# Patient Record
Sex: FEMALE | Race: WHITE | NOT HISPANIC OR LATINO | Employment: OTHER | URBAN - METROPOLITAN AREA
[De-identification: names, ages, dates, MRNs, and addresses within clinical notes are randomized per-mention and may not be internally consistent; named-entity substitution may affect disease eponyms.]

---

## 2017-08-25 ENCOUNTER — HISTORICAL (OUTPATIENT)
Dept: RADIOLOGY | Facility: HOSPITAL | Age: 62
End: 2017-08-25

## 2017-09-27 ENCOUNTER — HISTORICAL (OUTPATIENT)
Dept: CARDIOLOGY | Facility: CLINIC | Age: 62
End: 2017-09-27

## 2017-10-28 ENCOUNTER — HISTORICAL (OUTPATIENT)
Dept: LAB | Facility: HOSPITAL | Age: 62
End: 2017-10-28

## 2017-10-28 LAB
ABS NEUT (OLG): 3.16 X10(3)/MCL (ref 2.1–9.2)
ALBUMIN SERPL-MCNC: 3.7 GM/DL (ref 3.4–5)
ALBUMIN/GLOB SERPL: 1 RATIO (ref 1–2)
ALP SERPL-CCNC: 117 UNIT/L (ref 45–117)
ALT SERPL-CCNC: 31 UNIT/L (ref 12–78)
AST SERPL-CCNC: 24 UNIT/L (ref 15–37)
BASOPHILS # BLD AUTO: 0.04 X10(3)/MCL
BASOPHILS NFR BLD AUTO: 1 % (ref 0–1)
BILIRUB SERPL-MCNC: 0.4 MG/DL (ref 0.2–1)
BILIRUBIN DIRECT+TOT PNL SERPL-MCNC: 0.1 MG/DL
BILIRUBIN DIRECT+TOT PNL SERPL-MCNC: 0.3 MG/DL
BUN SERPL-MCNC: 14 MG/DL (ref 7–18)
CALCIUM SERPL-MCNC: 8.9 MG/DL (ref 8.5–10.1)
CHLORIDE SERPL-SCNC: 106 MMOL/L (ref 98–107)
CO2 SERPL-SCNC: 29 MMOL/L (ref 21–32)
CREAT SERPL-MCNC: 0.6 MG/DL (ref 0.6–1.3)
EOSINOPHIL # BLD AUTO: 0.12 10*3/UL
EOSINOPHIL NFR BLD AUTO: 2 % (ref 0–5)
ERYTHROCYTE [DISTWIDTH] IN BLOOD BY AUTOMATED COUNT: 16.2 % (ref 11.5–14.5)
GLOBULIN SER-MCNC: 3.8 GM/ML (ref 2.3–3.5)
GLUCOSE SERPL-MCNC: 95 MG/DL (ref 74–106)
HCT VFR BLD AUTO: 38.4 % (ref 35–46)
HGB BLD-MCNC: 12.5 GM/DL (ref 12–16)
IMM GRANULOCYTES # BLD AUTO: 0.01 10*3/UL
IMM GRANULOCYTES NFR BLD AUTO: 0 %
LYMPHOCYTES # BLD AUTO: 1.68 X10(3)/MCL
LYMPHOCYTES NFR BLD AUTO: 30 % (ref 15–40)
MCH RBC QN AUTO: 29.1 PG (ref 26–34)
MCHC RBC AUTO-ENTMCNC: 32.6 GM/DL (ref 31–37)
MCV RBC AUTO: 89.5 FL (ref 80–100)
MONOCYTES # BLD AUTO: 0.62 X10(3)/MCL
MONOCYTES NFR BLD AUTO: 11 % (ref 4–12)
NEUTROPHILS # BLD AUTO: 3.16 X10(3)/MCL
NEUTROPHILS NFR BLD AUTO: 56 X10(3)/MCL
PLATELET # BLD AUTO: 199 X10(3)/MCL (ref 130–400)
PMV BLD AUTO: 11 FL (ref 7.4–10.4)
POTASSIUM SERPL-SCNC: 3.9 MMOL/L (ref 3.5–5.1)
PROT SERPL-MCNC: 7.5 GM/DL (ref 6.4–8.2)
RBC # BLD AUTO: 4.29 X10(6)/MCL (ref 4–5.2)
SODIUM SERPL-SCNC: 143 MMOL/L (ref 136–145)
WBC # SPEC AUTO: 5.6 X10(3)/MCL (ref 4.5–11)

## 2017-11-10 ENCOUNTER — HISTORICAL (OUTPATIENT)
Dept: RADIOLOGY | Facility: HOSPITAL | Age: 62
End: 2017-11-10

## 2018-11-16 ENCOUNTER — HISTORICAL (OUTPATIENT)
Dept: ENDOSCOPY | Facility: HOSPITAL | Age: 63
End: 2018-11-16

## 2018-11-16 LAB — CRC RECOMMENDATION EXT: NORMAL

## 2019-04-11 ENCOUNTER — HISTORICAL (OUTPATIENT)
Dept: RADIOLOGY | Facility: HOSPITAL | Age: 64
End: 2019-04-11

## 2019-09-23 ENCOUNTER — HISTORICAL (OUTPATIENT)
Dept: LAB | Facility: HOSPITAL | Age: 64
End: 2019-09-23

## 2019-09-23 LAB
ALBUMIN SERPL-MCNC: 3.8 GM/DL (ref 3.4–5)
ALBUMIN/GLOB SERPL: 0.9 RATIO (ref 1.1–2)
ALP SERPL-CCNC: 136 UNIT/L (ref 45–117)
ALT SERPL-CCNC: 37 UNIT/L (ref 12–78)
AST SERPL-CCNC: 25 UNIT/L (ref 15–37)
BILIRUB SERPL-MCNC: 0.5 MG/DL (ref 0.2–1)
BILIRUBIN DIRECT+TOT PNL SERPL-MCNC: 0.2 MG/DL (ref 0–0.2)
BILIRUBIN DIRECT+TOT PNL SERPL-MCNC: 0.3 MG/DL
BUN SERPL-MCNC: 13 MG/DL (ref 7–18)
CALCIUM SERPL-MCNC: 9.5 MG/DL (ref 8.5–10.1)
CHLORIDE SERPL-SCNC: 103 MMOL/L (ref 98–107)
CHOLEST SERPL-MCNC: 178 MG/DL
CHOLEST/HDLC SERPL: 2.7 {RATIO} (ref 0–4.4)
CO2 SERPL-SCNC: 33 MMOL/L (ref 21–32)
CREAT SERPL-MCNC: 0.8 MG/DL (ref 0.6–1.3)
GLOBULIN SER-MCNC: 4.4 GM/ML (ref 2.3–3.5)
GLUCOSE SERPL-MCNC: 118 MG/DL (ref 74–106)
HDLC SERPL-MCNC: 65 MG/DL (ref 40–59)
LDLC SERPL CALC-MCNC: 90 MG/DL
POTASSIUM SERPL-SCNC: 3.5 MMOL/L (ref 3.5–5.1)
PROT SERPL-MCNC: 8.2 GM/DL (ref 6.4–8.2)
SODIUM SERPL-SCNC: 142 MMOL/L (ref 136–145)
TRIGL SERPL-MCNC: 113 MG/DL
VLDLC SERPL CALC-MCNC: 23 MG/DL

## 2019-09-25 ENCOUNTER — HISTORICAL (OUTPATIENT)
Dept: LAB | Facility: HOSPITAL | Age: 64
End: 2019-09-25

## 2019-09-25 ENCOUNTER — HISTORICAL (OUTPATIENT)
Dept: RADIOLOGY | Facility: HOSPITAL | Age: 64
End: 2019-09-25

## 2019-09-25 LAB
ALP SERPL-CCNC: 127 UNIT/L (ref 45–117)
GGT SERPL-CCNC: 28 UNIT/L (ref 5–85)

## 2020-04-08 ENCOUNTER — HISTORICAL (OUTPATIENT)
Dept: RADIOLOGY | Facility: HOSPITAL | Age: 65
End: 2020-04-08

## 2020-09-01 ENCOUNTER — HISTORICAL (OUTPATIENT)
Dept: LAB | Facility: HOSPITAL | Age: 65
End: 2020-09-01

## 2020-09-01 LAB
ALBUMIN SERPL-MCNC: 3.8 GM/DL (ref 3.4–5)
ALBUMIN/GLOB SERPL: 0.8 RATIO (ref 1.1–2)
ALP SERPL-CCNC: 155 UNIT/L (ref 45–117)
ALT SERPL-CCNC: 26 UNIT/L (ref 12–78)
AST SERPL-CCNC: 23 UNIT/L (ref 15–37)
BILIRUB SERPL-MCNC: 0.5 MG/DL (ref 0.2–1)
BILIRUBIN DIRECT+TOT PNL SERPL-MCNC: 0.1 MG/DL (ref 0–0.2)
BILIRUBIN DIRECT+TOT PNL SERPL-MCNC: 0.4 MG/DL
BUN SERPL-MCNC: 11 MG/DL (ref 7–18)
CALCIUM SERPL-MCNC: 9.6 MG/DL (ref 8.5–10.1)
CHLORIDE SERPL-SCNC: 101 MMOL/L (ref 98–107)
CHOLEST SERPL-MCNC: 161 MG/DL
CHOLEST/HDLC SERPL: 2.4 {RATIO} (ref 0–4.4)
CO2 SERPL-SCNC: 32 MMOL/L (ref 21–32)
CREAT SERPL-MCNC: 0.8 MG/DL (ref 0.6–1.3)
ERYTHROCYTE [DISTWIDTH] IN BLOOD BY AUTOMATED COUNT: 12.6 % (ref 11.5–14.5)
GLOBULIN SER-MCNC: 4.6 GM/ML (ref 2.3–3.5)
GLUCOSE SERPL-MCNC: 120 MG/DL (ref 74–106)
HCT VFR BLD AUTO: 42.3 % (ref 35–46)
HDLC SERPL-MCNC: 66 MG/DL (ref 40–59)
HGB BLD-MCNC: 14 GM/DL (ref 12–16)
LDLC SERPL CALC-MCNC: 74 MG/DL
MCH RBC QN AUTO: 30.4 PG (ref 26–34)
MCHC RBC AUTO-ENTMCNC: 33.1 GM/DL (ref 31–37)
MCV RBC AUTO: 92 FL (ref 80–100)
PLATELET # BLD AUTO: 214 X10(3)/MCL (ref 130–400)
PMV BLD AUTO: 11.4 FL (ref 7.4–10.4)
POTASSIUM SERPL-SCNC: 2.9 MMOL/L (ref 3.5–5.1)
PROT SERPL-MCNC: 8.4 GM/DL (ref 6.4–8.2)
RBC # BLD AUTO: 4.6 X10(6)/MCL (ref 4–5.2)
SODIUM SERPL-SCNC: 141 MMOL/L (ref 136–145)
TRIGL SERPL-MCNC: 106 MG/DL
TSH SERPL-ACNC: 2.44 MIU/L (ref 0.36–3.74)
VLDLC SERPL CALC-MCNC: 21 MG/DL
WBC # SPEC AUTO: 7.6 X10(3)/MCL (ref 4.5–11)

## 2020-10-21 ENCOUNTER — HISTORICAL (OUTPATIENT)
Dept: LAB | Facility: HOSPITAL | Age: 65
End: 2020-10-21

## 2020-11-10 ENCOUNTER — HISTORICAL (OUTPATIENT)
Dept: RADIOLOGY | Facility: HOSPITAL | Age: 65
End: 2020-11-10

## 2020-11-12 ENCOUNTER — HISTORICAL (OUTPATIENT)
Dept: RADIOLOGY | Facility: HOSPITAL | Age: 65
End: 2020-11-12

## 2021-01-12 ENCOUNTER — HISTORICAL (OUTPATIENT)
Dept: LAB | Facility: HOSPITAL | Age: 66
End: 2021-01-12

## 2021-02-03 ENCOUNTER — HISTORICAL (OUTPATIENT)
Dept: RADIOLOGY | Facility: HOSPITAL | Age: 66
End: 2021-02-03

## 2021-03-26 ENCOUNTER — HISTORICAL (OUTPATIENT)
Dept: LAB | Facility: HOSPITAL | Age: 66
End: 2021-03-26

## 2021-04-06 ENCOUNTER — HISTORICAL (OUTPATIENT)
Dept: RADIOLOGY | Facility: HOSPITAL | Age: 66
End: 2021-04-06

## 2021-09-17 ENCOUNTER — HISTORICAL (OUTPATIENT)
Dept: ADMINISTRATIVE | Facility: HOSPITAL | Age: 66
End: 2021-09-17

## 2021-09-17 LAB — TSH SERPL-ACNC: 1.2 UIU/ML (ref 0.35–4.94)

## 2021-11-09 ENCOUNTER — HISTORICAL (OUTPATIENT)
Dept: ADMINISTRATIVE | Facility: HOSPITAL | Age: 66
End: 2021-11-09

## 2021-11-09 LAB
ABS NEUT (OLG): 3.56 X10(3)/MCL (ref 2.1–9.2)
ALBUMIN SERPL-MCNC: 4.1 GM/DL (ref 3.4–4.8)
ALBUMIN/GLOB SERPL: 1.1 RATIO (ref 1.1–2)
ALP SERPL-CCNC: 121 UNIT/L (ref 40–150)
ALT SERPL-CCNC: 22 UNIT/L (ref 0–55)
AST SERPL-CCNC: 23 UNIT/L (ref 5–34)
BASOPHILS # BLD AUTO: 0.1 X10(3)/MCL (ref 0–0.2)
BASOPHILS NFR BLD AUTO: 1 %
BILIRUB SERPL-MCNC: 0.7 MG/DL
BILIRUBIN DIRECT+TOT PNL SERPL-MCNC: 0.3 MG/DL (ref 0–0.5)
BILIRUBIN DIRECT+TOT PNL SERPL-MCNC: 0.4 MG/DL (ref 0–0.8)
BUN SERPL-MCNC: 12 MG/DL (ref 9.8–20.1)
CALCIUM SERPL-MCNC: 10.5 MG/DL (ref 8.7–10.5)
CHLORIDE SERPL-SCNC: 101 MMOL/L (ref 98–107)
CHOLEST SERPL-MCNC: 177 MG/DL
CHOLEST/HDLC SERPL: 3 {RATIO} (ref 0–5)
CO2 SERPL-SCNC: 32 MMOL/L (ref 23–31)
CREAT SERPL-MCNC: 0.74 MG/DL (ref 0.55–1.02)
EOSINOPHIL # BLD AUTO: 0.1 X10(3)/MCL (ref 0–0.9)
EOSINOPHIL NFR BLD AUTO: 2 %
ERYTHROCYTE [DISTWIDTH] IN BLOOD BY AUTOMATED COUNT: 12.5 % (ref 11.5–17)
EST. AVERAGE GLUCOSE BLD GHB EST-MCNC: 119.8 MG/DL
GLOBULIN SER-MCNC: 3.6 GM/DL (ref 2.4–3.5)
GLUCOSE SERPL-MCNC: 100 MG/DL (ref 82–115)
HBA1C MFR BLD: 5.8 %
HCT VFR BLD AUTO: 41 % (ref 37–47)
HDLC SERPL-MCNC: 67 MG/DL (ref 35–60)
HGB BLD-MCNC: 13.6 GM/DL (ref 12–16)
LDLC SERPL CALC-MCNC: 93 MG/DL (ref 50–140)
LYMPHOCYTES # BLD AUTO: 1.8 X10(3)/MCL (ref 0.6–4.6)
LYMPHOCYTES NFR BLD AUTO: 29 %
MCH RBC QN AUTO: 31.1 PG (ref 27–31)
MCHC RBC AUTO-ENTMCNC: 33.2 GM/DL (ref 33–36)
MCV RBC AUTO: 93.6 FL (ref 80–94)
MONOCYTES # BLD AUTO: 0.6 X10(3)/MCL (ref 0.1–1.3)
MONOCYTES NFR BLD AUTO: 9 %
NEUTROPHILS # BLD AUTO: 3.56 X10(3)/MCL (ref 2.1–9.2)
NEUTROPHILS NFR BLD AUTO: 58 %
PLATELET # BLD AUTO: 239 X10(3)/MCL (ref 130–400)
PMV BLD AUTO: 11.1 FL (ref 9.4–12.4)
POTASSIUM SERPL-SCNC: 4.9 MMOL/L (ref 3.5–5.1)
PROT SERPL-MCNC: 7.7 GM/DL (ref 5.8–7.6)
RBC # BLD AUTO: 4.38 X10(6)/MCL (ref 4.2–5.4)
SODIUM SERPL-SCNC: 142 MMOL/L (ref 136–145)
TRIGL SERPL-MCNC: 83 MG/DL (ref 37–140)
TSH SERPL-ACNC: 1.47 UIU/ML (ref 0.35–4.94)
VLDLC SERPL CALC-MCNC: 17 MG/DL
WBC # SPEC AUTO: 6.1 X10(3)/MCL (ref 4.5–11.5)

## 2022-02-07 ENCOUNTER — HISTORICAL (OUTPATIENT)
Dept: ADMINISTRATIVE | Facility: HOSPITAL | Age: 67
End: 2022-02-07

## 2022-02-07 ENCOUNTER — HISTORICAL (OUTPATIENT)
Dept: RADIOLOGY | Facility: HOSPITAL | Age: 67
End: 2022-02-07

## 2022-02-07 LAB — BCS RECOMMENDATION EXT: NORMAL

## 2022-02-21 ENCOUNTER — HISTORICAL (OUTPATIENT)
Dept: HEMATOLOGY/ONCOLOGY | Facility: CLINIC | Age: 67
End: 2022-02-21

## 2022-02-21 LAB
ABS NEUT (OLG): 4.34 (ref 2.1–9.2)
ALBUMIN SERPL-MCNC: 4 G/DL (ref 3.4–4.8)
ALBUMIN/GLOB SERPL: 1.1 {RATIO} (ref 1.1–2)
ALP SERPL-CCNC: 130 U/L (ref 40–150)
ALT SERPL-CCNC: 26 U/L (ref 0–55)
AST SERPL-CCNC: 24 U/L (ref 5–34)
BASOPHILS # BLD AUTO: 0.1 10*3/UL (ref 0–0.2)
BASOPHILS NFR BLD AUTO: 0.8 %
BILIRUB SERPL-MCNC: 0.6 MG/DL
BILIRUBIN DIRECT+TOT PNL SERPL-MCNC: 0.3 (ref 0–0.5)
BILIRUBIN DIRECT+TOT PNL SERPL-MCNC: 0.3 (ref 0–0.8)
BUN SERPL-MCNC: 14.3 MG/DL (ref 9.8–20.1)
CALCIUM SERPL-MCNC: 10.3 MG/DL (ref 8.7–10.5)
CHLORIDE SERPL-SCNC: 102 MMOL/L (ref 98–107)
CO2 SERPL-SCNC: 34 MMOL/L (ref 23–31)
CREAT SERPL-MCNC: 0.78 MG/DL (ref 0.55–1.02)
EOSINOPHIL # BLD AUTO: 0.1 10*3/UL (ref 0–0.9)
EOSINOPHIL NFR BLD AUTO: 1.8 %
ERYTHROCYTE [DISTWIDTH] IN BLOOD BY AUTOMATED COUNT: 12.6 % (ref 11.5–17)
GLOBULIN SER-MCNC: 3.5 G/DL (ref 2.4–3.5)
GLUCOSE SERPL-MCNC: 88 MG/DL (ref 82–115)
HCT VFR BLD AUTO: 39.8 % (ref 37–47)
HEMOLYSIS INTERF INDEX SERPL-ACNC: 3
HGB BLD-MCNC: 13 G/DL (ref 12–16)
ICTERIC INTERF INDEX SERPL-ACNC: 1
LIPEMIC INTERF INDEX SERPL-ACNC: 3
LYMPHOCYTES # BLD AUTO: 2.3 10*3/UL (ref 0.6–4.6)
LYMPHOCYTES NFR BLD AUTO: 29.5 %
MANUAL DIFF? (OHS): NO
MCH RBC QN AUTO: 30.6 PG (ref 27–31)
MCHC RBC AUTO-ENTMCNC: 32.7 G/DL (ref 33–36)
MCV RBC AUTO: 93.6 FL (ref 80–94)
MONOCYTES # BLD AUTO: 0.8 10*3/UL (ref 0.1–1.3)
MONOCYTES NFR BLD AUTO: 11 %
NEUTROPHILS # BLD AUTO: 4.3 10*3/UL (ref 2.1–9.2)
NEUTROPHILS NFR BLD AUTO: 56.6 %
PLATELET # BLD AUTO: 242 10*3/UL (ref 130–400)
PMV BLD AUTO: 10.5 FL (ref 9.4–12.4)
POTASSIUM SERPL-SCNC: 4 MMOL/L (ref 3.5–5.1)
PROT SERPL-MCNC: 7.5 G/DL (ref 5.8–7.6)
RBC # BLD AUTO: 4.25 10*6/UL (ref 4.2–5.4)
SODIUM SERPL-SCNC: 143 MMOL/L (ref 136–145)
WBC # SPEC AUTO: 7.7 10*3/UL (ref 4.5–11.5)

## 2022-02-24 ENCOUNTER — HISTORICAL (OUTPATIENT)
Dept: ADMINISTRATIVE | Facility: HOSPITAL | Age: 67
End: 2022-02-24

## 2022-02-24 ENCOUNTER — HISTORICAL (OUTPATIENT)
Dept: RADIOLOGY | Facility: HOSPITAL | Age: 67
End: 2022-02-24

## 2022-03-16 ENCOUNTER — HISTORICAL (OUTPATIENT)
Dept: ADMINISTRATIVE | Facility: HOSPITAL | Age: 67
End: 2022-03-16

## 2022-03-16 ENCOUNTER — HISTORICAL (OUTPATIENT)
Dept: RADIOLOGY | Facility: HOSPITAL | Age: 67
End: 2022-03-16

## 2022-03-23 ENCOUNTER — HISTORICAL (OUTPATIENT)
Dept: ADMINISTRATIVE | Facility: HOSPITAL | Age: 67
End: 2022-03-23

## 2022-03-23 LAB
ABS NEUT (OLG): 4.17 (ref 2.1–9.2)
ALBUMIN SERPL-MCNC: 4 G/DL (ref 3.4–4.8)
ALBUMIN/GLOB SERPL: 1.1 {RATIO} (ref 1.1–2)
ALP SERPL-CCNC: 135 U/L (ref 40–150)
ALT SERPL-CCNC: 26 U/L (ref 0–55)
AST SERPL-CCNC: 24 U/L (ref 5–34)
BASOPHILS # BLD AUTO: 0.1 10*3/UL (ref 0–0.2)
BASOPHILS NFR BLD AUTO: 1 %
BILIRUB SERPL-MCNC: 0.5 MG/DL
BILIRUBIN DIRECT+TOT PNL SERPL-MCNC: 0.2 (ref 0–0.5)
BILIRUBIN DIRECT+TOT PNL SERPL-MCNC: 0.3 (ref 0–0.8)
BUN SERPL-MCNC: 15.6 MG/DL (ref 9.8–20.1)
CALCIUM SERPL-MCNC: 10 MG/DL (ref 8.7–10.5)
CANCER AG125 SERPL-ACNC: 9.1 (ref 0–35)
CHLORIDE SERPL-SCNC: 101 MMOL/L (ref 98–107)
CO2 SERPL-SCNC: 32 MMOL/L (ref 23–31)
CREAT SERPL-MCNC: 0.75 MG/DL (ref 0.55–1.02)
EOSINOPHIL # BLD AUTO: 0.2 10*3/UL (ref 0–0.9)
EOSINOPHIL NFR BLD AUTO: 2.2 %
ERYTHROCYTE [DISTWIDTH] IN BLOOD BY AUTOMATED COUNT: 12.8 % (ref 11.5–17)
GLOBULIN SER-MCNC: 3.7 G/DL (ref 2.4–3.5)
GLUCOSE SERPL-MCNC: 116 MG/DL (ref 82–115)
HCT VFR BLD AUTO: 42.2 % (ref 37–47)
HEMOLYSIS INTERF INDEX SERPL-ACNC: 17
HGB BLD-MCNC: 13.5 G/DL (ref 12–16)
ICTERIC INTERF INDEX SERPL-ACNC: 1
LIPEMIC INTERF INDEX SERPL-ACNC: 9
LYMPHOCYTES # BLD AUTO: 2 10*3/UL (ref 0.6–4.6)
LYMPHOCYTES NFR BLD AUTO: 28.8 %
MANUAL DIFF? (OHS): NO
MCH RBC QN AUTO: 30.7 PG (ref 27–31)
MCHC RBC AUTO-ENTMCNC: 32 G/DL (ref 33–36)
MCV RBC AUTO: 95.9 FL (ref 80–94)
MONOCYTES # BLD AUTO: 0.6 10*3/UL (ref 0.1–1.3)
MONOCYTES NFR BLD AUTO: 8 %
NEUTROPHILS # BLD AUTO: 4.2 10*3/UL (ref 2.1–9.2)
NEUTROPHILS NFR BLD AUTO: 59.9 %
PLATELET # BLD AUTO: 231 10*3/UL (ref 130–400)
PMV BLD AUTO: 10.4 FL (ref 9.4–12.4)
POTASSIUM SERPL-SCNC: 3.6 MMOL/L (ref 3.5–5.1)
PROT SERPL-MCNC: 7.7 G/DL (ref 5.8–7.6)
RBC # BLD AUTO: 4.4 10*6/UL (ref 4.2–5.4)
SODIUM SERPL-SCNC: 143 MMOL/L (ref 136–145)
WBC # SPEC AUTO: 7 10*3/UL (ref 4.5–11.5)

## 2022-04-10 ENCOUNTER — HISTORICAL (OUTPATIENT)
Dept: ADMINISTRATIVE | Facility: HOSPITAL | Age: 67
End: 2022-04-10
Payer: MEDICARE

## 2022-04-20 ENCOUNTER — HISTORICAL (OUTPATIENT)
Dept: PREADMISSION TESTING | Facility: HOSPITAL | Age: 67
End: 2022-04-20
Payer: MEDICARE

## 2022-04-20 LAB
ABS NEUT (OLG): 4.23 (ref 2.1–9.2)
ALBUMIN SERPL-MCNC: 4.1 G/DL (ref 3.4–4.8)
ALBUMIN/GLOB SERPL: 1.1 {RATIO} (ref 1.1–2)
ALP SERPL-CCNC: 135 U/L (ref 40–150)
ALT SERPL-CCNC: 21 U/L (ref 0–55)
AST SERPL-CCNC: 21 U/L (ref 5–34)
BASOPHILS # BLD AUTO: 0.1 10*3/UL (ref 0–0.2)
BASOPHILS NFR BLD AUTO: 1 %
BILIRUB SERPL-MCNC: 0.6 MG/DL
BILIRUBIN DIRECT+TOT PNL SERPL-MCNC: 0.3 (ref 0–0.5)
BILIRUBIN DIRECT+TOT PNL SERPL-MCNC: 0.3 (ref 0–0.8)
BUN SERPL-MCNC: 12.4 MG/DL (ref 9.8–20.1)
CALCIUM SERPL-MCNC: 10.1 MG/DL (ref 8.7–10.5)
CHLORIDE SERPL-SCNC: 100 MMOL/L (ref 98–107)
CO2 SERPL-SCNC: 34 MMOL/L (ref 23–31)
CREAT SERPL-MCNC: 0.79 MG/DL (ref 0.55–1.02)
EOSINOPHIL # BLD AUTO: 0.2 10*3/UL (ref 0–0.9)
EOSINOPHIL NFR BLD AUTO: 2 %
ERYTHROCYTE [DISTWIDTH] IN BLOOD BY AUTOMATED COUNT: 12.7 % (ref 11.5–17)
GLOBULIN SER-MCNC: 3.6 G/DL (ref 2.4–3.5)
GLUCOSE SERPL-MCNC: 97 MG/DL (ref 82–115)
HCT VFR BLD AUTO: 43.2 % (ref 37–47)
HEMOLYSIS INTERF INDEX SERPL-ACNC: 4
HGB BLD-MCNC: 14.2 G/DL (ref 12–16)
ICTERIC INTERF INDEX SERPL-ACNC: 1
LIPEMIC INTERF INDEX SERPL-ACNC: 1
LYMPHOCYTES # BLD AUTO: 2.1 10*3/UL (ref 0.6–4.6)
LYMPHOCYTES NFR BLD AUTO: 29 %
MANUAL DIFF? (OHS): NO
MCH RBC QN AUTO: 31.4 PG (ref 27–31)
MCHC RBC AUTO-ENTMCNC: 32.9 G/DL (ref 33–36)
MCV RBC AUTO: 95.6 FL (ref 80–94)
MONOCYTES # BLD AUTO: 0.8 10*3/UL (ref 0.1–1.3)
MONOCYTES NFR BLD AUTO: 10 %
NEUTROPHILS # BLD AUTO: 4.23 10*3/UL (ref 2.1–9.2)
NEUTROPHILS NFR BLD AUTO: 57 %
PLATELET # BLD AUTO: 209 10*3/UL (ref 130–400)
PMV BLD AUTO: 11.2 FL (ref 9.4–12.4)
POTASSIUM SERPL-SCNC: 3.8 MMOL/L (ref 3.5–5.1)
PROT SERPL-MCNC: 7.7 G/DL (ref 5.8–7.6)
RBC # BLD AUTO: 4.52 10*6/UL (ref 4.2–5.4)
SODIUM SERPL-SCNC: 143 MMOL/L (ref 136–145)
WBC # SPEC AUTO: 7.4 10*3/UL (ref 4.5–11.5)

## 2022-04-25 ENCOUNTER — HISTORICAL (OUTPATIENT)
Dept: SURGERY | Facility: HOSPITAL | Age: 67
End: 2022-04-25
Payer: MEDICARE

## 2022-04-29 VITALS
OXYGEN SATURATION: 98 % | BODY MASS INDEX: 36.89 KG/M2 | HEIGHT: 64 IN | WEIGHT: 216.06 LBS | SYSTOLIC BLOOD PRESSURE: 118 MMHG | DIASTOLIC BLOOD PRESSURE: 82 MMHG

## 2022-05-01 DIAGNOSIS — C50.919 BREAST CA: ICD-10-CM

## 2022-05-01 DIAGNOSIS — Z17.0 ESTROGEN RECEPTOR POSITIVE: ICD-10-CM

## 2022-05-01 DIAGNOSIS — I89.0 ACQUIRED LYMPHEDEMA: ICD-10-CM

## 2022-05-01 DIAGNOSIS — D05.12 DUCTAL CARCINOMA IN SITU (DCIS) OF LEFT BREAST: Primary | ICD-10-CM

## 2022-05-02 RX ORDER — LEVOTHYROXINE SODIUM 50 UG/1
50 TABLET ORAL
COMMUNITY
Start: 2021-12-07 | End: 2022-05-02 | Stop reason: SDUPTHER

## 2022-05-02 RX ORDER — LEVOTHYROXINE SODIUM 50 UG/1
50 TABLET ORAL
Qty: 90 TABLET | Refills: 1 | Status: SHIPPED | OUTPATIENT
Start: 2022-05-02 | End: 2022-09-21

## 2022-05-02 NOTE — HISTORICAL [facility] CERNER
This is a historical note converted from Nathan. Formatting and pictures may have been removed.  Please reference Nathan for original formatting and attached multimedia. PROCEDURE:?  Colonoscopy  ?   INDICATION:?  screening colonoscopy  history L-breast CA  ?   CONSENT:?  The benefits, risks, and alternatives to the procedure were discussed and informed consent was obtained from the patient.?  ?   PREPARATION:?  EKG, pulse, pulse oximetry, and blood pressure were monitored throughout the procedure.?  ASA class III  Mallampati III  ?  MEDICATIONS:?  Monitored anesthesia care per anesthesiology  ?  RECTAL EXAM:?  Normal rectal exam  ?  PROCEDURE: The colonoscope was passed through the anus under direct visualization and was advanced with ease to the cecum and terminal ileum with visualization of the terminal ileum, cecal folds, appendiceal orifice, and IC valve. The scope was withdrawn and the mucosa was carefully examined. Retroflexion was performed in the rectum. The patient tolerated the procedure well. The preparation was fair to good.?  ?  FINDINGS:?  ?  Scattered diverticuli in the?sigmoid colon  otherwise normal colon  ?  ?  UNPLANNED EVENTS:?  There were no unplanned events.?  ?  EBL:  ?0cc  ?  RECOMMENDATIONS:?  - recommend repeat colonoscopy in?10 years  ?

## 2022-05-02 NOTE — HISTORICAL [facility] CERNER
This is a historical note converted from Nathan. Formatting and pictures may have been removed.  Please reference [facility] for original formatting and attached multimedia. 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 Denies fevers, chills, unintentional weight loss, blood in stool, changes in caliber of stool, urinary changes, or any other complaints at this time. Reports family history of fallopian tube cancer in sister. Denies family history of colon cancer. Reports prior history of colonoscopy in 2005 shortly following her breast cancer which she reports was WNL. Reports prior history of partial hysterectomy as well as resection ovarian tumor.  Review of Systems  12pt ROS performed as per HPI otherwise negative  Physical Exam  NAD, AAO  RRR  non-labored breathing, good resp effort  abd soft, NT, ND  Assessment/Plan  63yoF with PMH & mucinous cystadenoma here for screening colonoscopy  -RBA discussed with patient  -consents signed  -proceed with colonoscopy   Problem List/Past Medical History  Ongoing  Breast cancer  Hyperlipidemia  Hypertension  Hypothyroid  Historical  Diverticulosis of colon  IBS (irritable bowel syndrome)  Procedure/Surgical History  Total hysterectomy (1984)  Breast lumpectomy  Hx of hysterectomy  Lumpectomy of breast  Partial hysterectomy  Tonsillectomy  Tonsillectomy   Medications  Inpatient  No active inpatient medications  Home  aspirin 325 mg oral Delayed Release (EC) tablet, 325 mg= 1 tab(s), Oral, Daily  Caltrate 600 + D oral tablet, 1 tab(s), Oral, BID  Centrum Silver oral tablet, Oral, Daily  Dulcolax Laxative 5 mg ORAL enteric coated tablet, 10 mg= 2 tab(s), Oral, Daily, PRN  hydrochlorothiazide 25 mg oral tablet, 25 mg= 1 tab(s), Oral, Daily, 1  refills  levothyroxine 50 mcg (0.05 mg) oral tablet, 50 mcg= 1 tab(s), Oral, Daily, 1 refills  Linzess 145 mcg oral capsule, 145 mcg= 1 cap(s), Oral, Daily  Livalo 2 mg oral tablet, 2 mg= 1 tab(s), Oral, Daily, 6 refills  metoprolol tartrate 25 mg oral tab, 25 mg= 1 tab(s), Oral, BID, 2 refills  Allergies  penicillins  Social History  Alcohol  Never, 07/20/2015  Employment/School  Unemployed, 10/01/2018  Exercise  Exercise frequency: 1-2 times/week. Self assessment: Fair condition. Exercise type: Walking., 10/01/2018  Home/Environment  Lives with Spouse. Living situation: Home/Independent. TV/Computer concerns: No., 10/01/2018  Nutrition/Health  Regular, Wants to lose weight: Yes. Eating disorders: Overeating. Sleeping concerns: No. Feels highly stressed: No., 10/01/2018  Sexual  Sexually active: Yes. Number of current partners 1. Sexual orientation: Heterosexual. Uses condoms: No., 10/01/2018  Substance Abuse  Never, 07/20/2015  Tobacco  Never smoker, No, 11/15/2018  Family History  Diabetes 07-JUN-2017 17:45:38<$>: Sister and Grandmother.  Hyperlipidemia.: Sister.  Hypertension.: Mother, Father and Sister.  Ovarian cancer: Sister.  Stroke: Sister and Grandfather.  Immunizations  Vaccine Date Status   influenza virus vaccine, inactivated 10/01/2018 Given

## 2022-05-03 PROBLEM — C50.211 BREAST CANCER OF UPPER-INNER QUADRANT OF RIGHT FEMALE BREAST: Status: ACTIVE | Noted: 2022-05-03

## 2022-05-03 PROBLEM — Z85.3 HISTORY OF LEFT BREAST CANCER: Status: ACTIVE | Noted: 2022-05-03

## 2022-05-03 PROBLEM — D05.12 DUCTAL CARCINOMA IN SITU (DCIS) OF LEFT BREAST: Status: ACTIVE | Noted: 2022-05-03

## 2022-05-04 ENCOUNTER — OFFICE VISIT (OUTPATIENT)
Dept: HEMATOLOGY/ONCOLOGY | Facility: CLINIC | Age: 67
End: 2022-05-04
Payer: MEDICARE

## 2022-05-04 ENCOUNTER — OFFICE VISIT (OUTPATIENT)
Dept: SURGERY | Facility: CLINIC | Age: 67
End: 2022-05-04
Payer: MEDICARE

## 2022-05-04 VITALS
TEMPERATURE: 98 F | WEIGHT: 213.63 LBS | BODY MASS INDEX: 36.47 KG/M2 | SYSTOLIC BLOOD PRESSURE: 136 MMHG | RESPIRATION RATE: 18 BRPM | HEIGHT: 64 IN | HEART RATE: 73 BPM | OXYGEN SATURATION: 99 % | DIASTOLIC BLOOD PRESSURE: 77 MMHG

## 2022-05-04 DIAGNOSIS — Z85.3 HISTORY OF LEFT BREAST CANCER: Primary | ICD-10-CM

## 2022-05-04 DIAGNOSIS — Z90.13 HISTORY OF BILATERAL MASTECTOMY: Primary | ICD-10-CM

## 2022-05-04 DIAGNOSIS — D05.12 DUCTAL CARCINOMA IN SITU (DCIS) OF LEFT BREAST: ICD-10-CM

## 2022-05-04 DIAGNOSIS — Z85.43 PERSONAL HISTORY OF OVARIAN CANCER: ICD-10-CM

## 2022-05-04 DIAGNOSIS — Z17.0 MALIGNANT NEOPLASM OF CENTRAL PORTION OF RIGHT BREAST IN FEMALE, ESTROGEN RECEPTOR POSITIVE: ICD-10-CM

## 2022-05-04 DIAGNOSIS — T81.89XA DELAYED SURGICAL WOUND HEALING, INITIAL ENCOUNTER: ICD-10-CM

## 2022-05-04 DIAGNOSIS — Z85.3 PERSONAL HISTORY OF BREAST CANCER: ICD-10-CM

## 2022-05-04 DIAGNOSIS — C50.111 MALIGNANT NEOPLASM OF CENTRAL PORTION OF RIGHT BREAST IN FEMALE, ESTROGEN RECEPTOR POSITIVE: ICD-10-CM

## 2022-05-04 DIAGNOSIS — Z80.3 FAMILY HISTORY OF BREAST CANCER: ICD-10-CM

## 2022-05-04 PROCEDURE — 1160F RVW MEDS BY RX/DR IN RCRD: CPT | Mod: CPTII,S$GLB,, | Performed by: PHYSICIAN ASSISTANT

## 2022-05-04 PROCEDURE — 99443 PR PHYSICIAN TELEPHONE EVALUATION 21-30 MIN: CPT | Mod: 95,,, | Performed by: NURSE PRACTITIONER

## 2022-05-04 PROCEDURE — 3075F SYST BP GE 130 - 139MM HG: CPT | Mod: CPTII,S$GLB,, | Performed by: PHYSICIAN ASSISTANT

## 2022-05-04 PROCEDURE — 1160F PR REVIEW ALL MEDS BY PRESCRIBER/CLIN PHARMACIST DOCUMENTED: ICD-10-PCS | Mod: CPTII,S$GLB,, | Performed by: PHYSICIAN ASSISTANT

## 2022-05-04 PROCEDURE — 99999 PR PBB SHADOW E&M-EST. PATIENT-LVL IV: ICD-10-PCS | Mod: PBBFAC,,, | Performed by: PHYSICIAN ASSISTANT

## 2022-05-04 PROCEDURE — 99999 PR PBB SHADOW E&M-EST. PATIENT-LVL IV: CPT | Mod: PBBFAC,,, | Performed by: PHYSICIAN ASSISTANT

## 2022-05-04 PROCEDURE — 1126F AMNT PAIN NOTED NONE PRSNT: CPT | Mod: CPTII,S$GLB,, | Performed by: PHYSICIAN ASSISTANT

## 2022-05-04 PROCEDURE — 1126F PR PAIN SEVERITY QUANTIFIED, NO PAIN PRESENT: ICD-10-PCS | Mod: CPTII,S$GLB,, | Performed by: PHYSICIAN ASSISTANT

## 2022-05-04 PROCEDURE — 1159F PR MEDICATION LIST DOCUMENTED IN MEDICAL RECORD: ICD-10-PCS | Mod: CPTII,95,, | Performed by: NURSE PRACTITIONER

## 2022-05-04 PROCEDURE — 99443 PR PHYSICIAN TELEPHONE EVALUATION 21-30 MIN: ICD-10-PCS | Mod: 95,,, | Performed by: NURSE PRACTITIONER

## 2022-05-04 PROCEDURE — 99024 PR POST-OP FOLLOW-UP VISIT: ICD-10-PCS | Mod: S$GLB,,, | Performed by: PHYSICIAN ASSISTANT

## 2022-05-04 PROCEDURE — 99024 POSTOP FOLLOW-UP VISIT: CPT | Mod: S$GLB,,, | Performed by: PHYSICIAN ASSISTANT

## 2022-05-04 PROCEDURE — 3078F PR MOST RECENT DIASTOLIC BLOOD PRESSURE < 80 MM HG: ICD-10-PCS | Mod: CPTII,S$GLB,, | Performed by: PHYSICIAN ASSISTANT

## 2022-05-04 PROCEDURE — 3078F DIAST BP <80 MM HG: CPT | Mod: CPTII,S$GLB,, | Performed by: PHYSICIAN ASSISTANT

## 2022-05-04 PROCEDURE — 3075F PR MOST RECENT SYSTOLIC BLOOD PRESS GE 130-139MM HG: ICD-10-PCS | Mod: CPTII,S$GLB,, | Performed by: PHYSICIAN ASSISTANT

## 2022-05-04 PROCEDURE — 3008F PR BODY MASS INDEX (BMI) DOCUMENTED: ICD-10-PCS | Mod: CPTII,S$GLB,, | Performed by: PHYSICIAN ASSISTANT

## 2022-05-04 PROCEDURE — 1101F PR PT FALLS ASSESS DOC 0-1 FALLS W/OUT INJ PAST YR: ICD-10-PCS | Mod: CPTII,S$GLB,, | Performed by: PHYSICIAN ASSISTANT

## 2022-05-04 PROCEDURE — 3288F PR FALLS RISK ASSESSMENT DOCUMENTED: ICD-10-PCS | Mod: CPTII,S$GLB,, | Performed by: PHYSICIAN ASSISTANT

## 2022-05-04 PROCEDURE — 1159F MED LIST DOCD IN RCRD: CPT | Mod: CPTII,95,, | Performed by: NURSE PRACTITIONER

## 2022-05-04 PROCEDURE — 1159F MED LIST DOCD IN RCRD: CPT | Mod: CPTII,S$GLB,, | Performed by: PHYSICIAN ASSISTANT

## 2022-05-04 PROCEDURE — 3008F BODY MASS INDEX DOCD: CPT | Mod: CPTII,S$GLB,, | Performed by: PHYSICIAN ASSISTANT

## 2022-05-04 PROCEDURE — 1101F PT FALLS ASSESS-DOCD LE1/YR: CPT | Mod: CPTII,S$GLB,, | Performed by: PHYSICIAN ASSISTANT

## 2022-05-04 PROCEDURE — 3288F FALL RISK ASSESSMENT DOCD: CPT | Mod: CPTII,S$GLB,, | Performed by: PHYSICIAN ASSISTANT

## 2022-05-04 PROCEDURE — 1159F PR MEDICATION LIST DOCUMENTED IN MEDICAL RECORD: ICD-10-PCS | Mod: CPTII,S$GLB,, | Performed by: PHYSICIAN ASSISTANT

## 2022-05-04 RX ORDER — SULFAMETHOXAZOLE AND TRIMETHOPRIM 800; 160 MG/1; MG/1
1 TABLET ORAL 2 TIMES DAILY
Qty: 20 TABLET | Refills: 0 | Status: SHIPPED | OUTPATIENT
Start: 2022-05-04 | End: 2022-05-14

## 2022-05-04 RX ORDER — BISACODYL 5 MG
5 TABLET, DELAYED RELEASE (ENTERIC COATED) ORAL DAILY PRN
COMMUNITY
End: 2022-05-13

## 2022-05-04 RX ORDER — FUROSEMIDE 20 MG/1
20 TABLET ORAL DAILY
COMMUNITY
Start: 2021-12-13 | End: 2022-12-08

## 2022-05-04 RX ORDER — METOPROLOL TARTRATE 25 MG/1
TABLET, FILM COATED ORAL 2 TIMES DAILY
COMMUNITY
Start: 2022-02-24 | End: 2022-10-03

## 2022-05-04 RX ORDER — ATORVASTATIN CALCIUM 20 MG/1
20 TABLET, FILM COATED ORAL DAILY
COMMUNITY
Start: 2021-12-07 | End: 2022-07-13

## 2022-05-04 RX ORDER — HYDROCHLOROTHIAZIDE 25 MG/1
25 TABLET ORAL DAILY
COMMUNITY
Start: 2021-12-07 | End: 2022-07-20

## 2022-05-04 RX ORDER — POTASSIUM CHLORIDE 20 MEQ/1
20 TABLET, EXTENDED RELEASE ORAL DAILY
COMMUNITY
Start: 2022-02-24 | End: 2022-07-20

## 2022-05-04 NOTE — PROGRESS NOTES
REFERRING PHYSICIAN:  Dr. Gertrude Trejo, Dr. Judi Richards    Subjective:       Patient ID: Riana Pastrana is a 66 y.o. female.    Chief Complaint: GC  Personal history of left breast cancer dx50y, right breast cancer dx66y, ovarian mass (records requested to verify diagnosis) dx62y, and a family history of cancer. Patient presented via Telemedicine Visit (audio only) today for risk assessment, genetic counseling, and consideration for genetic testing.    HPI  No past medical history on file.   Review of patient's allergies indicates:   Allergen Reactions    Penicillins Other (See Comments)     UNKNOWN REACTION. ALLERGY SINCE CHILDHOOD        Review of Systems      Assessment:       Problem List Items Addressed This Visit    None        Diagnosis/Problem list:   Left breast cancer-diagnosed 2005  Pelvic mass ??-removed 2017  DCIS left breast Dx 3/16/2022  Right breast --Invasive     Treatment history:    Left breast lumpectomy followed by RT -->  endocrine therapy with tamoxifen for 5 years  Diagnosed with breast cancer in 2005.  She had Left lumpectomy done, with no chemotherapy or radiation. She took tamoxifen for 5 years.     Mass in her stomach and it was removed in 2017 along with her ovaries at [facility].  Imaging studies include CT scan of the abdomen and pelvis done on 5/21/2017 for an abdominal distention reveal a 22 x 21 x 16 cm complex septated peripherally enhancing mass which appears to arise from the right adnexa, presumably ovarian in origin.  Uterus not identified.  Cystic mass in the abdomen and pelvis displaces the urinary bladder inferiorly.  Liver is is heterogeneous in attenuation and contains 2 low-attenuation masses in the dome, too small to accurately characterize by CT.  Spleen normal in size. CT of the chest showed cardiomegaly with moderate pericardial effusion.  Bilateral lower lobe consolidation with atelectasis and bilateral pleural effusions, left  greater than right. Large abdominal cystic mass of unknown etiology. Further assessment with dedicated imaging of the abdomen and pelvis recommended. US pelvis 5/22/2021:  A large, multilocular mass consistent with the CT findings is identified measuring 27.5 x 20.1 x 19.6 cm. There are multiple septations or separate fluid loculations in the large cystic mass. Low level internal echogenicity is also visible in the cyst fluid with dependent debris also identified. No free pelvic fluid is visible. This lesion is suspicious for neoplasm of pelvic and likely ovarian origin. The uterus is not identified and surgically absent by patient history. 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    Ascites  suggestive of peritoneal implants,  the ascites is new from the previous study.        Plan:       Risk Assessment:  This patient is at increased risk of having an inherited genetic mutation that increases the risk for cancer. She meets criteria for genetic testing based on the National Comprehensive Cancer Network (NCCN) criteria due to a personal history of breast cancer diagnosed twice, with the first under 50y (dx50y,dx66y), and additional evidence of a family history that includes ovarian cancer (sister) (see family history and pedigree). Based on her likelihood of having a mutation, [employer] cancer panel testing was described in detail.    Education and Counseling:  [employer] CancerNext evaluates a broad number of hereditary cancer syndromes to help define patients' cancer risk. This cancer panel tests 36 genes with known association to increased cancer risk: APC, BACILIO, AXIN2, BARD1, BRCA1, BRCA2, BMPR1A, BRIP1, CDH1, CDK4, CDKN2A, CHEK2, DICER1, HOXB13, EPCAM, GREM1, MLH1, MSH2, MSH6, MUTYH, NBN, NF1, PALB2, PMS2, POLD1, POLE, PTEN, RAD50, RECQL, RAD51C, RAD51D, SMAD4, SMARCA4, STK11, TP53.    Risks of cancer associated with inherited cancer predisposition  mutations were discussed in detail.  If a mutation were found, this patient would have a significantly increased risk for cancer.  Inherited cancer syndromes included in this test, may have different, but still significant risk for cancer.  Risk of cancer with any particular gene mutation will be discussed at the time of results disclosure and based on the results.    The availability of clinical management options for inherited cancer predisposition mutation carriers was discussed, including increased surveillance, chemoprevention, and prophylactic surgery. Details of the testing process, including benefits and limitations of genetic analysis as well as the implications of possible test results, were discussed.  Because this patient is the first member of her family to be tested comprehensive panel testing was presented.  Related insurance issues were discussed.      Summary:  This patient was evaluated for hereditary risk of cancer and was found to be at an increased risk of having an inherited cancer predisposition gene mutation.  The option of genetic testing was explained in detail, including the possible impact of this information on family members.  Since this patient wishes to proceed with testing an order will be placed online with [facility]. Informed consent will be obtained, lab drawn and sent to [facility].  Results will be expected 4 weeks from this time.  A follow-up appointment will be scheduled for results disclosure.       Visit type: audio only    Established Patient - Audio Only Telehealth Visit     The patient location is: home  The chief complaint leading to consultation is: breast cancer  Visit type: Virtual visit with audio only (telephone)  Total time spent with patient: 45 minutes    60 minutes of total time spent on the encounter, which includes face to face time and non-face to face time preparing to see the patient (eg, review of tests), Obtaining and/or reviewing separately  obtained history, Documenting clinical information in the electronic or other health record, Independently interpreting results (not separately reported) and communicating results to the patient/family/caregiver, or Care coordination (not separately reported).      The reason for the audio only service rather than synchronous audio and video virtual visit was related to technical difficulties or patient preference/necessity.     Each patient to whom I provide medical services by telemedicine is:  (1) informed of the relationship between the physician and patient and the respective role of any other health care provider with respect to management of the patient; and (2) notified that they may decline to receive medical services by telemedicine and may withdraw from such care at any time. Patient verbally consented to receive this service via voice-only telephone call.     This service was not originating from a related E/M service provided within the previous 7 days nor will  to an E/M service or procedure within the next 24 hours or my soonest available appointment.  Prevailing standard of care was able to be met in this audio-only visit.          THIAGO BOLAND, PhD

## 2022-05-04 NOTE — PROGRESS NOTES
[facility] Breast Surg  Breast Surgical Oncology  Post-Op Patient Office Visit       Referring Provider: Dr. Judi Richards (MED ONC)  PCP: Chely Cameron MD   Care Team:   MED ONC: Dr. Judi Richards    Chief Complaint:   Chief Complaint   Patient presents with    Post-op Evaluation     Post op visit        Subjective:   Treatment History:  Left Breast Cancer Dx in 2005:  1. Left Lumpectomy/SLNB  2. XRT  3. 5 years of Tamoxifen     Bilateral Breast Cancer Diagnosed in 2022  1. Bilateral Simple Mastectomy and Right Bromide Lymph Node Biopsy 4/25/2022    Interval History:  5/4/2022 - Riana Pastrana is here today for her post op visit. She is s/p bilateral simple mastectomy and right sentinel lymph node biopsy. Surgical pathology of the right breast revealed invasive ductal carcinoma grade 1 measuring 4 mm. Margins negative. Four right sentinel lymph nodes are negative for malignancy (0/4). Surgical pathology of the left breast revealed central region/6:00 biopsy site with diminutive focus of residual low grade DCIS. Margins also negative.     She is doing okay since surgery but has noticed some redness to the incisions bilaterally. Denies pain, discharge, or swelling.    HPI:  Riana Pastrana initially presents on 03/29/22 at age 66 Years with a past history of left breast cancer diagnosed in 2005 SP lumpectomy/SLNB, XRT, and 5 years of Tamoxifen as well as recent mucinous cystosarcoma SP BSO diagnosed in 2017 who now presents with bilateral breast cancer. (1) AJCC 8th ed clinical anatomic stage IA / prognostic stage IA (cT1b cN0 M0) Right breast 3 o'clock subareolar invasive ductal carcinoma, grade 1, %,  %, HER2 0 - negative on IHC and Ki67 58%. (2) AJCC 8th ed clinical anatomic stage 0 / prognostic stage 0 (cTis cN0 M0) Left breast central posterior depth ductal carcinoma in situ, grade 1, % and %.     A detailed patient history was  obtained and reviewed.      Imagin. 2022 BL SC MG at [facility]-which revealed on R MG lower inner quadrant anterior depth, there are two focal asymmetries. On L MG central region posterior depth there are indeterminate calcifications. At 12 o'clock left breast middle depth, there is a focal asymmetry with calcifications. There are stable post-therapy changes of the left breast. No detrimental change at the lumpectomy bed. There are stable post-core needle biopsy changes and a jesús shaped clip in the right breast. No detrimental change at this biopsy site. No other significant masses, calcifications, or other findings are seen in either breast. BIRADS-0 ; additional imaging needed.    2. 2022 BL DG MG/ BL US BREAST LIMITED at [facility]- which revealed on R MG at 4 o'clock subareolar anterior there is a 0.7 cm oval, circumscribed mass. On L MG 12 o'clock, anterior depth, there is a 1.2 cm focal asymmetry with associated heterogeneous calcifications. These findings correspond to the areas recalled on the screening examination dated 2022. No other suspicious masses, calcifications, or other signs of malignancy are identified. On R US, at 3 o'clock subareolar there is a 0.6 x 0.4 x 0.4 cm oval, hypoechoic mass with indistinct margins. At 4 o'clock subareolar right breast, there is a 0.7 x 0.3 x 0.6 cm benign, simple cyst. These correspond to the mammographic findings in these regions. No other suspicious sonographic abnormality is seen. Specifically, no suspicious sonographic correlate is seen for the focal asymmetry in the 12 o'clock left breast. Benign-appearing lymph nodes are noted in the bilateral axillae. BIRADS-4 suspicious; biopsy recommended.    Pathology:  1. 3/16/2022 Ultrasound-guided Core Needle Biopsy Right Breast 3 o'clock Subareolar Mass - Invasive ductal carcinoma, grade 1  %  %  HER2 Negative  Ki67 - 58% High    2.3/16/2022 Stereotactic guided Core Needle Biopsy Left Breast  Posterior Depth Amorphous Grouped Calcifications Central Region - Ductal carcinoma in situ, grade 1  %  WV 99%    3. 3/16/2022 Stereotactic guided Core Needle Biopsy Left Breast 12 o'clock Anterior Depth Focal Asymmetry with Heterogenous Calcifications - Sclerotic fibroadenomatoid nodule with dystropic calcification    4. 4/25/2022 Bilateral Simple Mastectomy and Right SLNB - Surgical pathology of the right breast revealed invasive ductal carcinoma grade 1 measuring 4 mm. Margins negative. Four right sentinel lymph nodes are negative for malignancy (0/4). Surgical pathology of the left breast revealed central region/6:00 biopsy site with diminutive focus of residual low grade DCIS. Margins also negative.     OB/GYN History:  Menarche Onset: 13  Menopause: Post, at age:  Hormonal birth control (duration): yes  Pregnancies: 3  Age at first pregnancy: ?  Child births: 2  Hysterectomy: yes, partial hysterectomy at age 29  Oophorectomy: yes, 2017 after diagnosis of adnexal mass suspicious - final pathology after BSO was mucinous cystosarcoma  HRT: no    Other:  # of breast biopsies (when and pathology results): 1 Left breast cancer in 2005  MG breast density: Category B (Scattered fibroglandular)  Prior thoracic RT: none  Genetic testing: none  Ashkenazi Alevism descent: No    Family History:  Family History   Problem Relation Age of Onset    Ovarian cancer Sister 50        Patient History:  Past Medical History:   Diagnosis Date    History of bilateral breast cancer     left in 2007; bilateral in 2022       Past Surgical History:   Procedure Laterality Date    BREAST BIOPSY      BREAST SURGERY      HYSTERECTOMY      TONSILLECTOMY         Social History     Socioeconomic History    Marital status:    Tobacco Use    Smoking status: Never Smoker    Smokeless tobacco: Never Used   Substance and Sexual Activity    Alcohol use: Never    Drug use: Never         There is no immunization history on file  for this patient.    Medications/Allergies:  Current Outpatient Medications on File Prior to Visit   Medication Sig Dispense Refill    atorvastatin (LIPITOR) 20 MG tablet Take 20 mg by mouth.      bisacodyL (DULCOLAX) 5 mg EC tablet Take 5 mg by mouth daily as needed.      furosemide (LASIX) 20 MG tablet Take 20 mg by mouth.      hydroCHLOROthiazide (HYDRODIURIL) 25 MG tablet Take 25 mg by mouth.      levothyroxine (SYNTHROID) 50 MCG tablet Take 1 tablet (50 mcg total) by mouth before breakfast. 90 tablet 1    metoprolol tartrate (LOPRESSOR) 25 MG tablet       potassium chloride SA (K-DUR,KLOR-CON) 20 MEQ tablet        No current facility-administered medications on file prior to visit.       Review of patient's allergies indicates:   Allergen Reactions    Penicillins Other (See Comments)     UNKNOWN REACTION. ALLERGY SINCE CHILDHOOD         Review of Systems:  A comprehensive review of systems was negative.     Objective:     Vitals:  Vitals:    05/04/22 1518   BP: 136/77   Pulse: 73   Resp: 18   Temp: 98.4 °F (36.9 °C)     Body mass index is 36.66 kg/m².     Physical Exam:  General: The patient is awake, alert and oriented times three. The patient is well nourished and in no acute distress.    Musculoskeletal: The patient has a normal range of motion of her bilateral upper extremities.    Breast: There is redness superior to the bilateral mastectomy incisions. There is also some delayed healing noted to the right incision. No tenderness or swelling. There are drains in place bilaterally with orange serous fluid in the bulbs bilaterally.  Integumentary: no rashes or skin lesions present  Neurologic: cranial nerves intact, no signs of peripheral neurological deficit, motor/sensory function intact      Assessment and Plan:          Riana was seen today for post-op evaluation.    Diagnoses and all orders for this visit:    History of bilateral mastectomy    Delayed surgical wound healing, initial  encounter    Ductal carcinoma in situ (DCIS) of left breast    Malignant neoplasm of central portion of right breast in female, estrogen receptor positive    Personal history of ovarian cancer    Personal history of breast cancer    Other orders  -     sulfamethoxazole-trimethoprim 800-160mg (BACTRIM DS) 800-160 mg Tab; Take 1 tablet by mouth 2 (two) times daily. for 10 days         Patient is doing okay post op but is experiencing some redness/delayed healing to her incisions.  Left drain removed today (for the last 2-3 days, it has been <20 cc each day).  Pathology was discussed in detail.     Plan:       1. Bactrim DS sent to pharmacy for post op redness/delayed healing.  2. Recommend changing abdominal binder to ace wrap for comfort. Advised keeping incisions covered with gauze.  3. RTC in 1 week to recheck right drain and evaluate incisions.  4. Follow up with Dr. Richards for adjuvant treatment. No radiation indicated as patient is s/p bilateral mastectomy with negative lymph nodes.    All of her questions were answered.     Sirena Lee PA-C

## 2022-05-09 NOTE — PROGRESS NOTES
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 2022 BL SC MG at [facility]-which revealed on R MG lower inner quadrant anterior depth, there are two focal asymmetries. On L MG central region posterior depth there are indeterminate calcifications. At 12 o'clock left breast middle depth, there is a focal asymmetry with calcifications. There are stable post-therapy changes of the left breast. No detrimental change at the lumpectomy bed. There are stable post-core needle biopsy changes and a jesús shaped clip in the right breast. No detrimental change at this biopsy site. No other significant masses, calcifications, or other findings are seen in either breast. BIRADS-0 ; additional imaging needed.    2. 2022 BL DG MG/ BL US BREAST LIMITED at [facility]- which revealed on R MG at 4 o'clock subareolar anterior there is a 0.7 cm oval, circumscribed mass. On L MG 12 o'clock, anterior depth, there is a 1.2 cm focal asymmetry with associated heterogeneous calcifications. These findings correspond to the areas recalled on the screening examination dated 2022. No other suspicious masses, calcifications, or other signs of malignancy are identified. On R US, at 3 o'clock subareolar there is a 0.6 x 0.4 x 0.4 cm oval, hypoechoic mass with  indistinct margins. At 4 o'clock subareolar right breast, there is a 0.7 x 0.3 x 0.6 cm benign, simple cyst. These correspond to the mammographic findings in these regions. No other suspicious sonographic abnormality is seen. Specifically, no suspicious sonographic correlate is seen for the focal asymmetry in the 12 o'clock left breast. Benign-appearing lymph nodes are noted in the bilateral axillae. BIRADS-4 suspicious; biopsy recommended.      Pathology:  3/16/2022:   [1] LEFT BREAST POSTERIOR DEPTH AMORPHOUS GROUPED CALCIFICATIONS CENTRAL REGION: DUCTAL CARCINOMA IN SITU, LOW GRADE CRIBRIFORM/MICROPAPILLARY TYPE, WITH MICROCALCIFICATIONS. DCIS is present on two core biopsies, the largest focus measuring less than 3 mm.   [2] LEFT BREAST 12 O' CLOCK ANTERIOR DEPTH FOCAL ASYMMETRY WITH HETEROGENOUS CALCIFICATIONS:SCLEROTIC FIBROADENOMATOID NODULE WITH DYSTROPIC CALCIFICATION.   [3] RIGHT BREAST 3 O' CLOCK SUBAREOLAR MASS: INVASIVE DUCTAL CARCINOMA, SOTO-ROMERO GRADE 1.  Carcinoma is present on two core biopsies and measures 5 mm.    %, %, Her2 neg, Ki67 58%.    4/25/2022:  [1]  BREAST, LEFT, SIMPLE MASTECTOMY: DIMINUTIVE FOCUS OF RESIDUAL LOW GRADE DUCTAL CARCINOMA IN SITU.  [2]  BREAST, RIGHT, SIMPLE MASTECTOMY: INVASIVE DUCTAL CARCINOMA, LOW GRADE, GRADE 1(OF 3).  -Tumor size:  4.0 mm. pT Category:  pT1a  pN0  BREAST, RIGHT, AXILLARY SENTINEL NODE #4/4: NEGATIVE FOR METASTATIC CARCINOMA.  The patient's previous history of low grade invasive ductal carcinoma is noted from surgical pathology report [id number] ER (100%), AL (100%), HER2 negative, Ki-67 high (58%).              CLINICAL HISTORY:       Patient: Riana Pastrana is a 66 y.o. female kindly referred for history of breast cancer.  I do not have record of her diagnosis and treatment of her original breast cancer.    Patient states that she was diagnosed with breast cancer in 2005.  She had Left lumpectomy done, with no chemotherapy or  radiation. She took tamoxifen for 5 years. She reports that she was treated by Dr. Robbin Kenny. She says that she was seen at [facility], but since her insurance has changed she would like to establish care here.     Patient reports menarche at 13. She had 3 pregnancies, 2 live children, 1 miscarriage. Her first child was at age 20.She had a partial hysterectomy at age 29. She admits to oral contraceptives for about 3 years. No hormone replacement therapy.     Her sister had ovarian cancer diagnosed in her 50's, currently still alive at age 80.    She reports that she had a mass in her stomach and it was removed in 2017 along with her ovaries at [facility].  Again I do not have the records, but imaging studies none here include CT scan of the abdomen and pelvis done on 5/21/2017 for an abdominal distention showed a 22 x 21 x 16 cm complex septated peripherally enhancing mass which appears to arise from the right adnexa, presumably ovarian in origin.  Uterus not identified.  Cystic mass in the abdomen and pelvis displaces the urinary bladder inferiorly.  Liver is is heterogeneous in attenuation and contains 2 low-attenuation masses in the dome, too small to accurately characterize by CT.  Spleen normal in size. CT of the chest showed cardiomegaly with moderate pericardial effusion.  Bilateral lower lobe consolidation with atelectasis and bilateral pleural effusions, left greater than right. Large abdominal cystic mass of unknown etiology. Further assessment with dedicated imaging of the abdomen and pelvis recommended. US pelvis 5/22/2021:  A large, multilocular mass consistent with the CT findings is identified measuring 27.5 x 20.1 x 19.6 cm. There are multiple septations or separate fluid loculations in the large cystic mass. Low level internal echogenicity is also visible in the cyst fluid with dependent debris also identified. No free pelvic fluid is visible. This lesion is suspicious for neoplasm  of pelvic and likely ovarian origin. The uterus is not identified and surgically absent by patient history. 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  Ascites  suggestive of peritoneal implants,  the ascites is new from the previous study. No Path available    On 2/7/2022 screening mammogram: INCOMPLETE: NEEDS ADDITIONAL IMAGING EVALUATION  Right breast focal asymmetries, left breast calcifications, and left breast focal asymmetry with calcifications need further evaluation. BI-RADS 0: Incomplete. Need additional imaging evaluation.     On 2/24/2022 Diagnostic right mammogram and Breast US: SUSPICIOUS OF MALIGNANCY  1. Oval, hypoechoic mass with indistinct margins in the 3:00 subareolar right breast is suspicious. Right breast ultrasound-guided biopsy is recommended.  2. Amorphous, grouped calcifications in the central left breast, posterior depth, are suspicious. Left breast stereotactic guided biopsy is recommended.  3. Focal asymmetry with associated heterogeneous calcifications in the 12:00 left breast, anterior depth, is suspicious. Left breast stereotactic guided biopsy is recommended.     On 3/16/2022 she is schedule for breast bx. otherwise she is doing well and voices no concerns.  No fever, chills, sweats.  No chest pain or shortness of breath.  No changes in bowel habits.  No abdominal or pelvic pain.  No neurological symptoms.    [1] LEFT BREAST POSTERIOR DEPTH AMORPHOUS GROUPED CALCIFICATIONS CENTRAL REGION:  DUCTAL CARCINOMA IN SITU, LOW GRADE CRIBRIFORM/MICROPAPILLARY TYPE, WITH MICROCALCIFICATIONS.  COMMENT: DCIS is present on two core biopsies, the largest focus measuring less than 3 mm. The results of ER/TN analysis will be the subject of an addendum report.  [2] LEFT BREAST 12 O' CLOCK ANTERIOR DEPTH FOCAL ASYMMETRY WITH HETEROGENOUS CALCIFICATIONS:  SCLEROTIC FIBROADENOMATOID NODULE WITH DYSTROPIC CALCIFICATION.  [3] RIGHT BREAST 3 O' CLOCK  SUBAREOLAR MASS:  INVASIVE DUCTAL CARCINOMA, SOTO-ROMERO GRADE 1.  COMMENT: Carcinoma is present on two core biopsies and measures 5 mm.    %, %, Her2 neg, Ki67 58%.    Chief Complaint: st (Had a Bilateral Mastectomy on 4/25/22, states she still have tubes on the right side and have pain around the incision site.)      Interval History:    5/10/2022: Patient presents today for 2 week post-op visit, daughter present. She underwent bilateral mastectomies 4/25/2022. She still has drain right chest wall and is on antibiotics. She will see Dr Trejo this Thursday to remove drain.  She reports mild pain to right chest wall at incision site, drain still in place. Overall she is recovering and doing well. She denies any fever, chills, sweats.  No chest pain or shortness of breath.  No changes in bowel habits.    Review pathology with the patient and her daughter.  Invasive tumor with good characteristic. All LN neg, ER/OK positive, HER2 negative but Ki 67 is very high, 58%.  I discussed with them that even though I believe that she will only need endocrine therapy I would like to send her tissue for Oncotype due to her high Ki 67. She understand that if the score is 21 or greater she will benefit of adjuvant chemotherapy if score less than 21, no chemotherapy indicated.    Overall tumor size of about 0.9 cm.  Invasive component on the biopsy was 5 mm and tumor size 4 mm from mastectomy.    Past Medical History:   Diagnosis Date    History of bilateral breast cancer     left in 2007; bilateral in 2022      Past Surgical History:   Procedure Laterality Date    BREAST BIOPSY      BREAST SURGERY      HYSTERECTOMY      TONSILLECTOMY       Family History   Problem Relation Age of Onset    Ovarian cancer Sister 50     Social Connections: Not on file       Review of patient's allergies indicates:   Allergen Reactions    Penicillins Other (See Comments)     UNKNOWN REACTION. ALLERGY SINCE CHILDHOOD         Current Outpatient Medications on File Prior to Visit   Medication Sig Dispense Refill    atorvastatin (LIPITOR) 20 MG tablet Take 20 mg by mouth once daily.      bisacodyL (DULCOLAX) 5 mg EC tablet Take 5 mg by mouth daily as needed.      cholecalciferol, vitamin D3, (VITAMIN D3) 100 mcg (4,000 unit) Cap capsule Take 400 Units by mouth once daily. 2 po daily      furosemide (LASIX) 20 MG tablet Take 20 mg by mouth once daily at 6am.      hydroCHLOROthiazide (HYDRODIURIL) 25 MG tablet Take 25 mg by mouth once daily.      levothyroxine (SYNTHROID) 50 MCG tablet Take 1 tablet (50 mcg total) by mouth before breakfast. 90 tablet 1    metoprolol tartrate (LOPRESSOR) 25 MG tablet 2 (two) times daily.      potassium chloride SA (K-DUR,KLOR-CON) 20 MEQ tablet Take 20 mEq by mouth once daily.      sulfamethoxazole-trimethoprim 800-160mg (BACTRIM DS) 800-160 mg Tab Take 1 tablet by mouth 2 (two) times daily. for 10 days 20 tablet 0     No current facility-administered medications on file prior to visit.      Review of Systems   Constitutional: Negative for activity change, appetite change, chills, diaphoresis, fatigue, fever and unexpected weight change.   HENT: Negative for mouth dryness, mouth sores, nosebleeds, postnasal drip, sinus pressure/congestion, sore throat and trouble swallowing.    Eyes: Negative for visual disturbance.   Respiratory: Negative for apnea, cough, choking, chest tightness and shortness of breath.    Cardiovascular: Negative for chest pain, palpitations and leg swelling.   Gastrointestinal: Negative for abdominal distention, abdominal pain, blood in stool, change in bowel habit, constipation, diarrhea, nausea, vomiting and change in bowel habit.   Endocrine: Negative.    Genitourinary: Negative for difficulty urinating, dysuria, frequency, hematuria and urgency.   Musculoskeletal: Negative for arthralgias, back pain, myalgias and neck pain.   Integumentary:  Negative for rash, breast mass,  "breast discharge and breast tenderness.   Neurological: Negative for dizziness, tremors, syncope, speech difficulty, weakness, light-headedness, numbness, headaches and memory loss.   Hematological: Does not bruise/bleed easily.   Psychiatric/Behavioral: Negative for confusion, hallucinations, sleep disturbance and suicidal ideas.   Breast: Negative for mass and tenderness             Vitals:    05/10/22 1136   BP: 112/71   BP Location: Left arm   Patient Position: Sitting   BP Method: Large (Automatic)   Pulse: 63   Temp: 98 °F (36.7 °C)   SpO2: 98%   Weight: 96.6 kg (213 lb)   Height: 5' 4" (1.626 m)      Physical Exam  Vitals and nursing note reviewed.   Constitutional:       General: She is not in acute distress.     Appearance: Normal appearance. She is well-developed.   HENT:      Head: Normocephalic and atraumatic.      Mouth/Throat:      Mouth: Mucous membranes are moist.   Eyes:      General: No scleral icterus.     Extraocular Movements: Extraocular movements intact.      Conjunctiva/sclera: Conjunctivae normal.      Pupils: Pupils are equal, round, and reactive to light.   Neck:      Vascular: No JVD.   Cardiovascular:      Rate and Rhythm: Normal rate and regular rhythm.      Heart sounds: No murmur heard.  Pulmonary:      Effort: Pulmonary effort is normal.      Breath sounds: Normal breath sounds. No wheezing or rhonchi.   Chest:      Chest wall: No deformity or tenderness.   Breasts:      Right: Absent. No swelling, mass, skin change, tenderness, axillary adenopathy or supraclavicular adenopathy.      Left: Absent. No swelling, mass, skin change, tenderness, axillary adenopathy or supraclavicular adenopathy.       Abdominal:      General: Bowel sounds are normal. There is no distension.      Palpations: Abdomen is soft. There is no mass.      Tenderness: There is no abdominal tenderness.   Musculoskeletal:         General: No swelling or deformity.      Cervical back: Neck supple.   Lymphadenopathy: "      Cervical: No cervical adenopathy.      Upper Body:      Right upper body: No supraclavicular or axillary adenopathy.      Left upper body: No supraclavicular or axillary adenopathy.      Lower Body: No right inguinal adenopathy. No left inguinal adenopathy.   Skin:     General: Skin is warm.      Coloration: Skin is not jaundiced.      Findings: No lesion or rash.      Nails: There is no clubbing.   Neurological:      General: No focal deficit present.      Mental Status: She is alert and oriented to person, place, and time.      Cranial Nerves: Cranial nerves are intact.      Sensory: Sensation is intact.      Motor: Motor function is intact.      Gait: Gait is intact.   Psychiatric:         Attention and Perception: Attention normal.         Mood and Affect: Mood and affect normal.         Speech: Speech normal.         Behavior: Behavior is cooperative.         Thought Content: Thought content normal.         Cognition and Memory: Cognition normal.         Judgment: Judgment normal.       ECOG SCORE    0 - Fully active-able to carry on all pre-disease performance without restriction         Laboratory:  CBC with Differential:  @[id number](WBC,NEUTROPCT,LYMPH,MONOPCT,EOSPCT,BASOPHIL,RBC,HCT,HGB,MCV,MCH,MCMC,RDW,PLT,MPV)@  CMP:  @[id number](Glu,Calcium,Albumin,PROT,NA,K,CO2,CL,BUN,Creatinine,Alkphos,ALT,AST,Bilitot)@  BMP: @KDEHNQDHM69(GLU,Calcium,NA,K,CO2,CL,BUN,Creatinine)@  LFTs: @OMCEFOFFX10(ALT,AST,ALKPHOS,BILITOT,PROT,ALBUMIN)@  Haptoglobin: @[id number](HAPTOGLOBIN)@  Tumor Markers: @VTRZHZUVN48(PSA,CEA,,ALGTM,AFPTM,UO9245,)@  Immunology: @[id number](SPEP,BRANDEN,ZACKARY,FREELAMBDALI)@  Coagulation: @QMOMGXUHM39(PT,INR,APTT)@  Specimen (24h ago, onward)            None        Microbiology Results (last 7 days)     ** No results found for the last 168 hours. **                     Assessment:       1. Ductal carcinoma in situ (DCIS) of left breast    2. Malignant neoplasm of upper-inner quadrant of  right breast in female, estrogen receptor positive        1) Left breast cancer-diagnosed 2005   --lumpectomy/SLNB, XRT, and 5 years of Tamoxifen   2) Mucinous cystosarcoma-Dx 2017    --SP BSO   3) Bilateral breast cancer   --DCIS-clinical anatomic stage IA / prognostic stage IA (cT1b cN0 M0) left breast Dx 3/16/2022   --Clinical anatomic stage IA / prognostic stage IA (cT1b cN0 M0) Right breast    --%, %, Her 2 neg, Ki 67 58%, Grade 1   --Bilateral mastectomies 4/25/2022, Right SLND      Plan:       I discussed with them that even though I believe that she will only need endocrine therapy I would like to send her tissue for Oncotype due to her high Ki 67. She understand that if the score is 21 or greater she will benefit of adjuvant chemotherapy if score less than 21, no chemotherapy indicated.   Overall tumor size of about 0.9 cm.  Invasive component on the biopsy was 5 mm and tumor size 4 mm from mastectomy.      Will order Oncotype  Continue postop care  Continue antibiotics  Keep scheduled appt with Dr. Trejo (scheduled for 5/12/2022)  Return to clinic in 2 weeks   Encouraged to call for any questions or problems      GRAHAM HALL MD

## 2022-05-10 ENCOUNTER — OFFICE VISIT (OUTPATIENT)
Dept: HEMATOLOGY/ONCOLOGY | Facility: CLINIC | Age: 67
End: 2022-05-10
Payer: MEDICARE

## 2022-05-10 ENCOUNTER — LAB VISIT (OUTPATIENT)
Dept: LAB | Facility: HOSPITAL | Age: 67
End: 2022-05-10
Attending: INTERNAL MEDICINE
Payer: MEDICARE

## 2022-05-10 VITALS
BODY MASS INDEX: 36.37 KG/M2 | WEIGHT: 213 LBS | DIASTOLIC BLOOD PRESSURE: 71 MMHG | TEMPERATURE: 98 F | OXYGEN SATURATION: 98 % | HEART RATE: 63 BPM | HEIGHT: 64 IN | SYSTOLIC BLOOD PRESSURE: 112 MMHG

## 2022-05-10 DIAGNOSIS — C50.919 BREAST CA: ICD-10-CM

## 2022-05-10 DIAGNOSIS — C50.211 MALIGNANT NEOPLASM OF UPPER-INNER QUADRANT OF RIGHT BREAST IN FEMALE, ESTROGEN RECEPTOR POSITIVE: ICD-10-CM

## 2022-05-10 DIAGNOSIS — D05.12 DUCTAL CARCINOMA IN SITU (DCIS) OF LEFT BREAST: ICD-10-CM

## 2022-05-10 DIAGNOSIS — I89.0 ACQUIRED LYMPHEDEMA: ICD-10-CM

## 2022-05-10 DIAGNOSIS — D05.12 DUCTAL CARCINOMA IN SITU (DCIS) OF LEFT BREAST: Primary | ICD-10-CM

## 2022-05-10 DIAGNOSIS — Z17.0 ESTROGEN RECEPTOR POSITIVE: ICD-10-CM

## 2022-05-10 DIAGNOSIS — Z17.0 MALIGNANT NEOPLASM OF UPPER-INNER QUADRANT OF RIGHT BREAST IN FEMALE, ESTROGEN RECEPTOR POSITIVE: ICD-10-CM

## 2022-05-10 LAB
ALBUMIN SERPL-MCNC: 3.6 GM/DL (ref 3.4–4.8)
ALBUMIN/GLOB SERPL: 1.1 RATIO (ref 1.1–2)
ALP SERPL-CCNC: 144 UNIT/L (ref 40–150)
ALT SERPL-CCNC: 18 UNIT/L (ref 0–55)
AST SERPL-CCNC: 23 UNIT/L (ref 5–34)
BASOPHILS # BLD AUTO: 0.05 X10(3)/MCL (ref 0–0.2)
BASOPHILS NFR BLD AUTO: 0.8 %
BILIRUBIN DIRECT+TOT PNL SERPL-MCNC: 0.1 MG/DL (ref 0–0.5)
BILIRUBIN DIRECT+TOT PNL SERPL-MCNC: 0.2 MG/DL (ref 0–0.8)
BILIRUBIN DIRECT+TOT PNL SERPL-MCNC: 0.3 MG/DL
BUN SERPL-MCNC: 12.3 MG/DL (ref 9.8–20.1)
CALCIUM SERPL-MCNC: 9.4 MG/DL (ref 8.4–10.2)
CHLORIDE SERPL-SCNC: 97 MMOL/L (ref 98–107)
CO2 SERPL-SCNC: 30 MMOL/L (ref 23–31)
CREAT SERPL-MCNC: 0.85 MG/DL (ref 0.55–1.02)
EOSINOPHIL # BLD AUTO: 0.2 X10(3)/MCL (ref 0–0.9)
EOSINOPHIL NFR BLD AUTO: 3.3 %
ERYTHROCYTE [DISTWIDTH] IN BLOOD BY AUTOMATED COUNT: 12.5 % (ref 11.5–17)
GLOBULIN SER-MCNC: 3.2 GM/DL (ref 2.4–3.5)
GLUCOSE SERPL-MCNC: 104 MG/DL (ref 82–115)
HCT VFR BLD AUTO: 38.2 % (ref 37–47)
HGB BLD-MCNC: 12.6 GM/DL (ref 12–16)
IMM GRANULOCYTES # BLD AUTO: 0.02 X10(3)/MCL (ref 0–0.02)
IMM GRANULOCYTES NFR BLD AUTO: 0.3 % (ref 0–0.43)
LYMPHOCYTES # BLD AUTO: 0.92 X10(3)/MCL (ref 0.6–4.6)
LYMPHOCYTES NFR BLD AUTO: 15.3 %
MCH RBC QN AUTO: 30.9 PG (ref 27–31)
MCHC RBC AUTO-ENTMCNC: 33 MG/DL (ref 33–36)
MCV RBC AUTO: 93.6 FL (ref 80–94)
MONOCYTES # BLD AUTO: 0.68 X10(3)/MCL (ref 0.1–1.3)
MONOCYTES NFR BLD AUTO: 11.3 %
NEUTROPHILS # BLD AUTO: 4.2 X10(3)/MCL (ref 2.1–9.2)
NEUTROPHILS NFR BLD AUTO: 69 %
PLATELET # BLD AUTO: 255 X10(3)/MCL (ref 130–400)
PMV BLD AUTO: 10.2 FL (ref 9.4–12.4)
POTASSIUM SERPL-SCNC: 3.5 MMOL/L (ref 3.5–5.1)
PROT SERPL-MCNC: 6.8 GM/DL (ref 5.8–7.6)
RBC # BLD AUTO: 4.08 X10(6)/MCL (ref 4.2–5.4)
SODIUM SERPL-SCNC: 139 MMOL/L (ref 136–145)
WBC # SPEC AUTO: 6 X10(3)/MCL (ref 4.5–11.5)

## 2022-05-10 PROCEDURE — 3288F FALL RISK ASSESSMENT DOCD: CPT | Mod: CPTII,S$GLB,, | Performed by: INTERNAL MEDICINE

## 2022-05-10 PROCEDURE — 1159F PR MEDICATION LIST DOCUMENTED IN MEDICAL RECORD: ICD-10-PCS | Mod: CPTII,S$GLB,, | Performed by: INTERNAL MEDICINE

## 2022-05-10 PROCEDURE — 3078F PR MOST RECENT DIASTOLIC BLOOD PRESSURE < 80 MM HG: ICD-10-PCS | Mod: CPTII,S$GLB,, | Performed by: INTERNAL MEDICINE

## 2022-05-10 PROCEDURE — 99999 PR PBB SHADOW E&M-EST. PATIENT-LVL III: CPT | Mod: PBBFAC,,, | Performed by: INTERNAL MEDICINE

## 2022-05-10 PROCEDURE — 1101F PT FALLS ASSESS-DOCD LE1/YR: CPT | Mod: CPTII,S$GLB,, | Performed by: INTERNAL MEDICINE

## 2022-05-10 PROCEDURE — 3074F PR MOST RECENT SYSTOLIC BLOOD PRESSURE < 130 MM HG: ICD-10-PCS | Mod: CPTII,S$GLB,, | Performed by: INTERNAL MEDICINE

## 2022-05-10 PROCEDURE — 1159F MED LIST DOCD IN RCRD: CPT | Mod: CPTII,S$GLB,, | Performed by: INTERNAL MEDICINE

## 2022-05-10 PROCEDURE — 99215 OFFICE O/P EST HI 40 MIN: CPT | Mod: S$GLB,,, | Performed by: INTERNAL MEDICINE

## 2022-05-10 PROCEDURE — 1160F RVW MEDS BY RX/DR IN RCRD: CPT | Mod: CPTII,S$GLB,, | Performed by: INTERNAL MEDICINE

## 2022-05-10 PROCEDURE — 3008F BODY MASS INDEX DOCD: CPT | Mod: CPTII,S$GLB,, | Performed by: INTERNAL MEDICINE

## 2022-05-10 PROCEDURE — 80053 COMPREHEN METABOLIC PANEL: CPT

## 2022-05-10 PROCEDURE — 36415 COLL VENOUS BLD VENIPUNCTURE: CPT

## 2022-05-10 PROCEDURE — 99999 PR PBB SHADOW E&M-EST. PATIENT-LVL III: ICD-10-PCS | Mod: PBBFAC,,, | Performed by: INTERNAL MEDICINE

## 2022-05-10 PROCEDURE — 99215 PR OFFICE/OUTPT VISIT, EST, LEVL V, 40-54 MIN: ICD-10-PCS | Mod: S$GLB,,, | Performed by: INTERNAL MEDICINE

## 2022-05-10 PROCEDURE — 3078F DIAST BP <80 MM HG: CPT | Mod: CPTII,S$GLB,, | Performed by: INTERNAL MEDICINE

## 2022-05-10 PROCEDURE — 3008F PR BODY MASS INDEX (BMI) DOCUMENTED: ICD-10-PCS | Mod: CPTII,S$GLB,, | Performed by: INTERNAL MEDICINE

## 2022-05-10 PROCEDURE — 1160F PR REVIEW ALL MEDS BY PRESCRIBER/CLIN PHARMACIST DOCUMENTED: ICD-10-PCS | Mod: CPTII,S$GLB,, | Performed by: INTERNAL MEDICINE

## 2022-05-10 PROCEDURE — 3074F SYST BP LT 130 MM HG: CPT | Mod: CPTII,S$GLB,, | Performed by: INTERNAL MEDICINE

## 2022-05-10 PROCEDURE — 1126F AMNT PAIN NOTED NONE PRSNT: CPT | Mod: CPTII,S$GLB,, | Performed by: INTERNAL MEDICINE

## 2022-05-10 PROCEDURE — 85025 COMPLETE CBC W/AUTO DIFF WBC: CPT

## 2022-05-10 PROCEDURE — 1126F PR PAIN SEVERITY QUANTIFIED, NO PAIN PRESENT: ICD-10-PCS | Mod: CPTII,S$GLB,, | Performed by: INTERNAL MEDICINE

## 2022-05-10 PROCEDURE — 3288F PR FALLS RISK ASSESSMENT DOCUMENTED: ICD-10-PCS | Mod: CPTII,S$GLB,, | Performed by: INTERNAL MEDICINE

## 2022-05-10 PROCEDURE — 1101F PR PT FALLS ASSESS DOC 0-1 FALLS W/OUT INJ PAST YR: ICD-10-PCS | Mod: CPTII,S$GLB,, | Performed by: INTERNAL MEDICINE

## 2022-05-12 ENCOUNTER — OFFICE VISIT (OUTPATIENT)
Dept: SURGERY | Facility: CLINIC | Age: 67
End: 2022-05-12
Payer: MEDICARE

## 2022-05-12 VITALS
HEART RATE: 65 BPM | BODY MASS INDEX: 36.57 KG/M2 | WEIGHT: 214.19 LBS | OXYGEN SATURATION: 99 % | TEMPERATURE: 99 F | SYSTOLIC BLOOD PRESSURE: 131 MMHG | HEIGHT: 64 IN | DIASTOLIC BLOOD PRESSURE: 79 MMHG | RESPIRATION RATE: 18 BRPM

## 2022-05-12 DIAGNOSIS — D05.12 DUCTAL CARCINOMA IN SITU (DCIS) OF LEFT BREAST: ICD-10-CM

## 2022-05-12 DIAGNOSIS — Z90.13 HISTORY OF BILATERAL MASTECTOMY: Primary | ICD-10-CM

## 2022-05-12 DIAGNOSIS — T81.89XA DELAYED SURGICAL WOUND HEALING, INITIAL ENCOUNTER: ICD-10-CM

## 2022-05-12 PROCEDURE — 3288F FALL RISK ASSESSMENT DOCD: CPT | Mod: CPTII,S$GLB,,

## 2022-05-12 PROCEDURE — 3075F SYST BP GE 130 - 139MM HG: CPT | Mod: CPTII,S$GLB,,

## 2022-05-12 PROCEDURE — 1160F RVW MEDS BY RX/DR IN RCRD: CPT | Mod: CPTII,S$GLB,,

## 2022-05-12 PROCEDURE — 3078F DIAST BP <80 MM HG: CPT | Mod: CPTII,S$GLB,,

## 2022-05-12 PROCEDURE — 1101F PT FALLS ASSESS-DOCD LE1/YR: CPT | Mod: CPTII,S$GLB,,

## 2022-05-12 PROCEDURE — 3008F BODY MASS INDEX DOCD: CPT | Mod: CPTII,S$GLB,,

## 2022-05-12 PROCEDURE — 3288F PR FALLS RISK ASSESSMENT DOCUMENTED: ICD-10-PCS | Mod: CPTII,S$GLB,,

## 2022-05-12 PROCEDURE — 99215 PR OFFICE/OUTPT VISIT, EST, LEVL V, 40-54 MIN: ICD-10-PCS | Mod: S$GLB,,,

## 2022-05-12 PROCEDURE — 1126F AMNT PAIN NOTED NONE PRSNT: CPT | Mod: CPTII,S$GLB,,

## 2022-05-12 PROCEDURE — 3008F PR BODY MASS INDEX (BMI) DOCUMENTED: ICD-10-PCS | Mod: CPTII,S$GLB,,

## 2022-05-12 PROCEDURE — 1159F MED LIST DOCD IN RCRD: CPT | Mod: CPTII,S$GLB,,

## 2022-05-12 PROCEDURE — 3078F PR MOST RECENT DIASTOLIC BLOOD PRESSURE < 80 MM HG: ICD-10-PCS | Mod: CPTII,S$GLB,,

## 2022-05-12 PROCEDURE — 1160F PR REVIEW ALL MEDS BY PRESCRIBER/CLIN PHARMACIST DOCUMENTED: ICD-10-PCS | Mod: CPTII,S$GLB,,

## 2022-05-12 PROCEDURE — 1101F PR PT FALLS ASSESS DOC 0-1 FALLS W/OUT INJ PAST YR: ICD-10-PCS | Mod: CPTII,S$GLB,,

## 2022-05-12 PROCEDURE — 99215 OFFICE O/P EST HI 40 MIN: CPT | Mod: S$GLB,,,

## 2022-05-12 PROCEDURE — 99999 PR PBB SHADOW E&M-EST. PATIENT-LVL III: CPT | Mod: PBBFAC,,,

## 2022-05-12 PROCEDURE — 1126F PR PAIN SEVERITY QUANTIFIED, NO PAIN PRESENT: ICD-10-PCS | Mod: CPTII,S$GLB,,

## 2022-05-12 PROCEDURE — 1159F PR MEDICATION LIST DOCUMENTED IN MEDICAL RECORD: ICD-10-PCS | Mod: CPTII,S$GLB,,

## 2022-05-12 PROCEDURE — 99999 PR PBB SHADOW E&M-EST. PATIENT-LVL III: ICD-10-PCS | Mod: PBBFAC,,,

## 2022-05-12 PROCEDURE — 3075F PR MOST RECENT SYSTOLIC BLOOD PRESS GE 130-139MM HG: ICD-10-PCS | Mod: CPTII,S$GLB,,

## 2022-05-12 RX ORDER — ONDANSETRON 4 MG/1
TABLET, ORALLY DISINTEGRATING ORAL
COMMUNITY
Start: 2022-04-25 | End: 2022-05-13

## 2022-05-12 RX ORDER — METOPROLOL SUCCINATE 25 MG/1
25 TABLET, EXTENDED RELEASE ORAL
COMMUNITY
End: 2022-11-23

## 2022-05-12 NOTE — PROGRESS NOTES
[facility] Breast Surg  Breast Surgical Oncology  Post-Op Patient Office Visit         Referring Provider: Dr. Judi Richards (MED ONC)  PCP: Chely Cameron MD   Care Team:   MED ONC: Dr. Judi Richards     Chief Complaint:        Chief Complaint   Patient presents with    Follow up visit- delayed surgical wound healing        Follow up visit- delayed surgical wound healing          Subjective:   Treatment History:  Left Breast Cancer Dx in 2005:  1. Left Lumpectomy/SLNB  2. XRT  3. 5 years of Tamoxifen      Bilateral Breast Cancer Diagnosed in 2022  1. Bilateral Simple Mastectomy and Right [place] Lymph Node Biopsy 4/25/2022     Interval History:  5/12/2022 - Riana Pastrana is here today for a one week follow up visit due to infection around the mastectomy site and delayed wound healing. She also has her right surgical RODOLFO drain intact that has been putting minimal drainage out.  She is taking her bactrim as prescribed. She has a few days left.         HPI:  Riana Pastrana initially presents on 03/29/22 at age 66 Years with a past history of left breast cancer diagnosed in 2005 SP lumpectomy/SLNB, XRT, and 5 years of Tamoxifen as well as recent mucinous cystosarcoma SP BSO diagnosed in 2017 who now presents with bilateral breast cancer. (1) AJCC 8th ed clinical anatomic stage IA / prognostic stage IA (cT1b cN0 M0) Right breast 3 o'clock subareolar invasive ductal carcinoma, grade 1, %,  %, HER2 0 - negative on IHC and Ki67 58%. (2) AJCC 8th ed clinical anatomic stage 0 / prognostic stage 0 (cTis cN0 M0) Left breast central posterior depth ductal carcinoma in situ, grade 1, % and %.     A detailed patient history was obtained and reviewed.      She is s/p bilateral simple mastectomy and right sentinel lymph node biopsy. Surgical pathology of the right breast revealed invasive ductal carcinoma grade 1 measuring 4 mm. Margins negative.  Four right sentinel lymph nodes are negative for malignancy (0/4). Surgical pathology of the left breast revealed central region/6:00 biopsy site with diminutive focus of residual low grade DCIS. Margins also negative.       Imagin. 2022 BL SC MG at [facility]-which revealed on R MG lower inner quadrant anterior depth, there are two focal asymmetries. On L MG central region posterior depth there are indeterminate calcifications. At 12 o'clock left breast middle depth, there is a focal asymmetry with calcifications. There are stable post-therapy changes of the left breast. No detrimental change at the lumpectomy bed. There are stable post-core needle biopsy changes and a jesús shaped clip in the right breast. No detrimental change at this biopsy site. No other significant masses, calcifications, or other findings are seen in either breast. BIRADS-0 ; additional imaging needed.    2. 2022 BL DG MG/ BL US BREAST LIMITED at [facility]- which revealed on R MG at 4 o'clock subareolar anterior there is a 0.7 cm oval, circumscribed mass. On L MG 12 o'clock, anterior depth, there is a 1.2 cm focal asymmetry with associated heterogeneous calcifications. These findings correspond to the areas recalled on the screening examination dated 2022. No other suspicious masses, calcifications, or other signs of malignancy are identified. On R US, at 3 o'clock subareolar there is a 0.6 x 0.4 x 0.4 cm oval, hypoechoic mass with indistinct margins. At 4 o'clock subareolar right breast, there is a 0.7 x 0.3 x 0.6 cm benign, simple cyst. These correspond to the mammographic findings in these regions. No other suspicious sonographic abnormality is seen. Specifically, no suspicious sonographic correlate is seen for the focal asymmetry in the 12 o'clock left breast. Benign-appearing lymph nodes are noted in the bilateral axillae. BIRADS-4 suspicious; biopsy recommended.     Pathology:  1. 3/16/2022 Ultrasound-guided Core Needle Biopsy  Right Breast 3 o'clock Subareolar Mass - Invasive ductal carcinoma, grade 1  %  %  HER2 Negative  Ki67 - 58% High    2.3/16/2022 Stereotactic guided Core Needle Biopsy Left Breast Posterior Depth Amorphous Grouped Calcifications Central Region - Ductal carcinoma in situ, grade 1  %  [place] 99%    3. 3/16/2022 Stereotactic guided Core Needle Biopsy Left Breast 12 o'clock Anterior Depth Focal Asymmetry with Heterogenous Calcifications - Sclerotic fibroadenomatoid nodule with dystropic calcification     4. 4/25/2022 Bilateral Simple Mastectomy and Right SLNB - Surgical pathology of the right breast revealed invasive ductal carcinoma grade 1 measuring 4 mm. Margins negative. Four right sentinel lymph nodes are negative for malignancy (0/4). Surgical pathology of the left breast revealed central region/6:00 biopsy site with diminutive focus of residual low grade DCIS. Margins also negative.      OB/GYN History:  Menarche Onset: 13  Menopause: Post, at age:  Hormonal birth control (duration): yes  Pregnancies: 3  Age at first pregnancy: ?  Child births: 2  Hysterectomy: yes, partial hysterectomy at age 29  Oophorectomy: yes, 2017 after diagnosis of adnexal mass suspicious - final pathology after BSO was mucinous cystosarcoma  HRT: no     Other:  # of breast biopsies (when and pathology results): 1 Left breast cancer in 2005  MG breast density: Category B (Scattered fibroglandular)  Prior thoracic RT: none  Genetic testing: none  Ashkenazi [place] descent: No     Family History:        Family History   Problem Relation Age of Onset    Ovarian cancer Sister 50         Patient History:  Past Medical History:   Diagnosis Date    History of bilateral breast cancer       left in 2007; bilateral in 2022               Past Surgical History:   Procedure Laterality Date    BREAST BIOPSY        BREAST SURGERY        HYSTERECTOMY        TONSILLECTOMY             Social History              Socioeconomic History     Marital status:    Tobacco Use    Smoking status: Never Smoker    Smokeless tobacco: Never Used   Substance and Sexual Activity    Alcohol use: Never    Drug use: Never               There is no immunization history on file for this patient.     Medications/Allergies:         Current Outpatient Medications on File Prior to Visit   Medication Sig Dispense Refill    atorvastatin (LIPITOR) 20 MG tablet Take 20 mg by mouth.        bisacodyL (DULCOLAX) 5 mg EC tablet Take 5 mg by mouth daily as needed.        furosemide (LASIX) 20 MG tablet Take 20 mg by mouth.        hydroCHLOROthiazide (HYDRODIURIL) 25 MG tablet Take 25 mg by mouth.        levothyroxine (SYNTHROID) 50 MCG tablet Take 1 tablet (50 mcg total) by mouth before breakfast. 90 tablet 1    metoprolol tartrate (LOPRESSOR) 25 MG tablet          potassium chloride SA (K-DUR,KLOR-CON) 20 MEQ tablet            No current facility-administered medications on file prior to visit.         Review of patient's allergies indicates:   Allergen Reactions    Penicillins Other (See Comments)       UNKNOWN REACTION. ALLERGY SINCE CHILDHOOD            Review of Systems:  A comprehensive review of systems was negative.     Objective:      Vitals:  Vitals:    05/12/22 1214   BP: 131/79   Pulse: 65   Resp: 18   Temp: 98.8 °F (37.1 °C)     Body mass index is 36.66 kg/m².      Physical Exam:  General: The patient is awake, alert and oriented times three. The patient is well nourished and in no acute distress.    Musculoskeletal: The patient has a normal range of motion of her bilateral upper extremities.    Breast: The ertheyma that was located superior to the bilateral mastectomy incisions has subsided. There is delayed healing noted to the right mastectomy incision. No tenderness or swelling.   Integumentary: no rashes or skin lesions present  Neurologic: cranial nerves intact, no signs of peripheral neurological deficit, motor/sensory function  intact        Assessment and Plan:         Riana was seen today for delayed wound healing.     Diagnoses and all orders for this visit:     History of bilateral mastectomy     Delayed surgical wound healing, initial encounter     Ductal carcinoma in situ (DCIS) of left breast     Riana Pastrana is here today for a one week follow up visit due to infection around the mastectomy site and delayed wound healing. She also has her right surgical RODOLFO drain intact that has been putting minimal drainage out.  She is taking her Bactrim as prescribed and only has a few days left. I removed her right surgical drain in the office today. I also did a wound debridement with consent in the office.     -----------------------------------------------------------------------------------------------------------------------       Cleaning and Debridement of wound located at right mastectomy incision site.     The wound shows evidence of non-viable tissue that is unlikely to be adequately managed by local wound care (dressing regimens, topical agents). Removal of the non-viable tissue would accelerate wound healing.     A pre-procedure wound assessment was obtained to determine the necessity for debridement. As well, the patients potential to experience pain was evaluated.    A debridement with a 15 blade  was performed to promote wound healing.Using gloves, sterile forceps were used to remove necrotic tissue in the wound. The tissue below the necrotic tissue was noted to be friable. The area was cleaned. Pressure was applied with gauze for 1-2 minutes. Next, it was dressed with mesalts, gauze, and paper tape.  This concluded the procedure.     Post-procedure, the necrotic tissue was removed and underlying tissue was noted to be friable. The area was dressed as described above.        Plan:         1. Continue Bactrim DS as prescribed. Educated the patient on the importance of this.   2. Continue compression with ace wrap.  3.  Dress the wound with mesalts and gauze BID. Keep C/D/I.   3. RTC in 1 week to recheck.   4. Call the office with any questions or concerns that may arise before follow up.     All of her questions were answered.     JESSICA Nj

## 2022-05-13 ENCOUNTER — OFFICE VISIT (OUTPATIENT)
Dept: INTERNAL MEDICINE | Facility: CLINIC | Age: 67
End: 2022-05-13
Payer: MEDICARE

## 2022-05-13 VITALS
HEART RATE: 74 BPM | TEMPERATURE: 98 F | BODY MASS INDEX: 36.37 KG/M2 | HEIGHT: 64 IN | OXYGEN SATURATION: 97 % | SYSTOLIC BLOOD PRESSURE: 139 MMHG | DIASTOLIC BLOOD PRESSURE: 46 MMHG | WEIGHT: 213 LBS

## 2022-05-13 DIAGNOSIS — I73.9 INTERMITTENT CLAUDICATION: ICD-10-CM

## 2022-05-13 DIAGNOSIS — Z85.3 HISTORY OF LEFT BREAST CANCER: ICD-10-CM

## 2022-05-13 DIAGNOSIS — E66.01 SEVERE OBESITY (BMI 35.0-39.9) WITH COMORBIDITY: ICD-10-CM

## 2022-05-13 DIAGNOSIS — I48.91 ATRIAL FIBRILLATION, UNSPECIFIED TYPE: ICD-10-CM

## 2022-05-13 DIAGNOSIS — E03.9 HYPOTHYROIDISM, UNSPECIFIED TYPE: Primary | ICD-10-CM

## 2022-05-13 DIAGNOSIS — E78.2 MIXED HYPERLIPIDEMIA: ICD-10-CM

## 2022-05-13 DIAGNOSIS — Z90.13 HISTORY OF BILATERAL MASTECTOMY: ICD-10-CM

## 2022-05-13 DIAGNOSIS — I10 PRIMARY HYPERTENSION: ICD-10-CM

## 2022-05-13 PROBLEM — E78.5 HYPERLIPIDEMIA: Status: ACTIVE | Noted: 2022-05-13

## 2022-05-13 LAB — TSH SERPL-ACNC: 1.69 UIU/ML (ref 0.35–4.94)

## 2022-05-13 PROCEDURE — 1159F PR MEDICATION LIST DOCUMENTED IN MEDICAL RECORD: ICD-10-PCS | Mod: CPTII,,,

## 2022-05-13 PROCEDURE — 99214 PR OFFICE/OUTPT VISIT, EST, LEVL IV, 30-39 MIN: ICD-10-PCS | Mod: ,,,

## 2022-05-13 PROCEDURE — 3078F PR MOST RECENT DIASTOLIC BLOOD PRESSURE < 80 MM HG: ICD-10-PCS | Mod: CPTII,,,

## 2022-05-13 PROCEDURE — 3078F DIAST BP <80 MM HG: CPT | Mod: CPTII,,,

## 2022-05-13 PROCEDURE — 1101F PR PT FALLS ASSESS DOC 0-1 FALLS W/OUT INJ PAST YR: ICD-10-PCS | Mod: CPTII,,,

## 2022-05-13 PROCEDURE — 3075F SYST BP GE 130 - 139MM HG: CPT | Mod: CPTII,,,

## 2022-05-13 PROCEDURE — 1160F RVW MEDS BY RX/DR IN RCRD: CPT | Mod: CPTII,,,

## 2022-05-13 PROCEDURE — 99214 OFFICE O/P EST MOD 30 MIN: CPT | Mod: ,,,

## 2022-05-13 PROCEDURE — 3008F PR BODY MASS INDEX (BMI) DOCUMENTED: ICD-10-PCS | Mod: CPTII,,,

## 2022-05-13 PROCEDURE — 3288F PR FALLS RISK ASSESSMENT DOCUMENTED: ICD-10-PCS | Mod: CPTII,,,

## 2022-05-13 PROCEDURE — 3288F FALL RISK ASSESSMENT DOCD: CPT | Mod: CPTII,,,

## 2022-05-13 PROCEDURE — 3075F PR MOST RECENT SYSTOLIC BLOOD PRESS GE 130-139MM HG: ICD-10-PCS | Mod: CPTII,,,

## 2022-05-13 PROCEDURE — 84443 ASSAY THYROID STIM HORMONE: CPT

## 2022-05-13 PROCEDURE — 1160F PR REVIEW ALL MEDS BY PRESCRIBER/CLIN PHARMACIST DOCUMENTED: ICD-10-PCS | Mod: CPTII,,,

## 2022-05-13 PROCEDURE — 36415 COLL VENOUS BLD VENIPUNCTURE: CPT

## 2022-05-13 PROCEDURE — 3008F BODY MASS INDEX DOCD: CPT | Mod: CPTII,,,

## 2022-05-13 PROCEDURE — 1101F PT FALLS ASSESS-DOCD LE1/YR: CPT | Mod: CPTII,,,

## 2022-05-13 PROCEDURE — 1159F MED LIST DOCD IN RCRD: CPT | Mod: CPTII,,,

## 2022-05-13 NOTE — PROGRESS NOTES
Subjective:       Patient ID: Riana Pastrana is a 66 y.o. female.    Chief Complaint: Follow-up (6 month f/u; no problems or concerns)    Ms. Mayers is a 66-year-old female here today for 6 month follow-up visit.  Medical comorbidities include the PAF, HTN, HLD, and breast cancer.  Also with history of left breast cancer s/p  lumpectomy/SLNB, radiation, and Tamoxifen. Now s/p bilateral mastectomy (04/25/2022) for Malignant neoplasm of central portion of right female breast and Ductal carcinoma in situ left breast. Followed by oncology , did have an abnormal CT scan with a cystic mass in the abdomen and pelvis which was followed by an ultrasound of the pelvis that showed a large multilocular mass consistent with septations or separate fluid loculations likely ovarian in origin; more ports to have upcoming visit with Oncology within next few weeks.  Otherwise doing well postoperatively without any acute complaints.      MCW: 11/12/21   CRS: 11/16/2018  Pneumococcal 23:  Next visit    Follow-up  Pertinent negatives include no chest pain, fatigue or fever.     Review of Systems   Constitutional: Negative for fatigue, fever and unexpected weight change.   Respiratory: Negative for shortness of breath.    Cardiovascular: Negative for chest pain.   Integumentary:  Positive for wound (Recent breast surgery).         Objective:      Physical Exam  Constitutional:       General: She is not in acute distress.     Appearance: Normal appearance.   HENT:      Right Ear: Tympanic membrane, ear canal and external ear normal.      Left Ear: Tympanic membrane, ear canal and external ear normal.      Nose: Nose normal.      Mouth/Throat:      Mouth: Mucous membranes are moist.      Pharynx: Oropharynx is clear.   Eyes:      Extraocular Movements: Extraocular movements intact.      Conjunctiva/sclera: Conjunctivae normal.      Pupils: Pupils are equal, round, and reactive to light.   Cardiovascular:      Rate and Rhythm:  Normal rate and regular rhythm.      Pulses: Normal pulses.      Heart sounds: Normal heart sounds. No murmur heard.    No gallop.   Pulmonary:      Effort: Pulmonary effort is normal.      Breath sounds: Normal breath sounds. No wheezing.   Chest:   Breasts:      Right: Absent.      Left: Absent.         Abdominal:      General: Bowel sounds are normal. There is no distension.      Palpations: Abdomen is soft. There is no mass.      Tenderness: There is no abdominal tenderness. There is no guarding.   Musculoskeletal:         General: Normal range of motion.   Skin:     General: Skin is warm and dry.      Findings: Wound (Surgical incision x 2) present.          Neurological:      Mental Status: She is alert. Mental status is at baseline.      Sensory: No sensory deficit.      Motor: No weakness.         Assessment:       Problem List Items Addressed This Visit        Cardiac/Vascular    A-fib     -doing well on metoprolol tartrate 25 mg b.i.d.           Hypertension     -currently on HCTZ 25 mg daily, furosemide 20 mg daily, metoprolo tartrate 25 mg b.i.d.   -low-sodium diet           Hyperlipidemia     -currently Lipitor 20 mg daily  -lipid panel for next visit  -low-cholesterol diet  -routine aerobic exercise to 2-3 times a week           Intermittent claudication       Renal/    History of bilateral mastectomy     -reports tolerated procedure well  -no acute complaints              Oncology    History of left breast cancer       Endocrine    Hypothyroidism - Primary     -check TSH today           Relevant Orders    TSH    Severe obesity (BMI 35.0-39.9) with comorbidity          Plan:   Return to clinic in 6 months for wellness with labs  Orders Placed This Encounter    TSH

## 2022-05-13 NOTE — ASSESSMENT & PLAN NOTE
-currently Lipitor 20 mg daily  -lipid panel for next visit  -low-cholesterol diet  -routine aerobic exercise to 2-3 times a week

## 2022-05-13 NOTE — ASSESSMENT & PLAN NOTE
-currently on HCTZ 25 mg daily, furosemide 20 mg daily, metoprolo tartrate 25 mg b.i.d.   -low-sodium diet

## 2022-05-14 NOTE — OP NOTE
DATE OF SERVICE:?04/25/22  ?  SURGEON: Dr. Gertrude Trejo  ?   ASSIST: Sirena Lee PA-C  ?   PREOPERATIVE DIAGNOSIS:?  1. Malignant neoplasm of central portion of right female breast  2. Ductal carcinoma in situ left breast  3. Personal history of left breast cancer SP lumpectomy/SLNB, radiation, and Tamoxifen  ?  POSTOPERATIVE DIAGNOSIS:?  1. Malignant neoplasm of central portion of right female breast  2. Ductal carcinoma in situ left breast  3. Personal history of left breast cancer SP lumpectomy/SLNB, radiation, and Tamoxifen  ?  PROCEDURE:  1.?Bilateral?subareolar injection of MagTrace Brown?dye  2.?Right?axillary sentinel lymph node biopsy  3.?Bilateral?simple mastectomy  ?   ANESTHESIA:?General Endo  [place] Jr KEENAN, Aris STUART (Supervisor)  Madison Greene CRNA (Provider)  ?  ESTIMATED BLOOD LOSS:?50 mL  ?   FINDINGS:  1. Left breast tissue with biopsy-proven malignancy, prior lumpectomy incision and SLNB incision  2. Right breast tissue with biopsy-proven malignancy  3. Right axillary normal appearing lymph nodes  ?   SPECIMEN:?  1. Left simple mastectomy  2. Right simple mastectomy  3.Right?axillary sentinel lymph node #1,?palpable, no MagTrace count or brown dye identified  4. Right?axillary sentinel lymph node #2,?palpable, no MagTrace count or brown dye identified  5. Right?axillary sentinel lymph node #3,?palpable, no MagTrace count or brown dye identified  6. Right?axillary sentinel lymph node #4,?palpable, no MagTrace count or brown dye identified  ?  DRAIN(S):?  ?   COMPLICATION(S):  ?  PROCEDURE INDICATION:  Riana Pastrana?initially presents on?03/29/22?at?age?66 Years?with?a past history of left breast cancer diagnosed in 2005 SP lumpectomy/SLNB, XRT, and 5 years of Tamoxifen as well as recent mucinous cystosarcoma SP BSO diagnosed in 2017 who now presents with?bilateral breast cancer. (1)?AJCC 8th ed clinical anatomic?stage ?IA / prognostic stage ?IA?(cT1b?cN0?M0) Right?breast 3 oclock  subareolar?invasive ductal carcinoma,?grade 1,?%,  %, HER2?0 - negative on IHC?and Ki67 58%.?(2)?AJCC 8th ed clinical anatomic?stage ?0 / prognostic stage 0?(cTis?cN0?M0) Left?breast central?posterior depth?ductal carcinoma in situ,?grade 1,?% and %.?  ?  ?  We discussed the need to proceed with local and systemic treatment. Local treatment options are surgery and radiation. The choices for surgical operations include mastectomy and lumpectomy with radiation. The general operative procedure of mastectomy, the skin sparing nature and attempts made to save underlying muscle with modified mastectomy were discussed. The options of primary reconstruction following mastectomy include either implant reconstruction or tissue transfer type of reconstruction. The pros and cons of both of these reconstructive methods were addressed. Both of these are performed in stages and second operation is usually required before the final completion of the reconstructive process. I also explained to the patient that the reconstructive options are generally by law required to be covered by insurance and would be considered part of a cancer operation and not a cosmetic operation. Sometimes also a reduction or mastopexy is performed on the opposite side for better match, and this operation by itself is also considered part of the cancer treatment.?  ?  Following explanation of the mastectomy option, I then explained to her the procedure of lumpectomy. The rationale for lumpectomy, the need to obtain clear margins was specifically addressed. The absolute necessity of adding radiation following lumpectomy with good margins was explained. The logistics of radiation were discussed at length. The patient will further discuss details of radiation with her Radiation Oncologist.?  ?  I informed her of the complications which include surgical site infections, hematoma or seroma requiring operation, necrosis of nipple-areola  and/or mastectomy flaps requiring debridement or hyperbaric therapy, unplanned re-operations, and delay in adjuvant treatments.?  ?  Following this, I also explained to her the procedure of sentinel lymph node mapping and its rationale. We have a 98% success rate identifying the sentinel node. The need to use MagTrace dye to ensure proper identification of the node was explained. Also the small but real risk of anaphylactic shock with MagTrace dye was discussed. The 5 to 10% false/negative rate of sentinel lymph node mapping and approximately 10% delayed positive rate of sentinel lymph node mapping was discussed. The process of Touch-Prep/Frozen-Section?and its significance was also explained. The need to perform completion dissection should the sentinel lymph node be positive was also explained to the patient.?Information and explanation of MagTrace was provided to the patient today.  ?  Following this, I have also briefly discussed with her the rationale for adjuvant systemic treatment in the form of either chemotherapy and/or hormonal therapy. This would depend on the presence of hormone receptors in the tumor. Further recommendations regarding the kind of chemo and the cycles would be finalized by Medical Oncology. She will discuss these issues at length with her medical oncologist. Specifically, we went over the local recurrence rate and survival rates with mastectomy and breast conserving therapy, the small but real risk of lymphedema should we need completion dissection, and the indications for post mastectomy radiation in certain subset of patients.?  ?  ?  PROCEDURE DESCRIPTION:  The patient was then brought to the operating room and placed supine on the operative table. General anesthesia was induced.?2 cc?s of?MagTrace dye was injected in the subareolar space of?the?both breasts?and gently messages.?The skin of ?Bilateral?breast?were?prepped and draped in standard sterile surgical fashion along  with?Bilateral?axilla and upper arm. A time-out was completed verifying correct patient, procedure, site, positioning and equipment prior to beginning the procedure.?  ?  A large?elliptical skin incision was made that encompassed the nipple-areola complex of the left breast. Flaps were raised in the avascular plane between subcutaneous tissue and breast tissue from the clavicle superiorly, the sternum medially, the anterior rectus sheath inferiorly, and the lateral border of the pectoralis major muscle laterally. Hemostasis was achieved in the flaps. Next, the breast tissue and underlying pectoralis fascia were excised from the pectoralis major muscle, progressing from medial to lateral. At the lateral border of the pectoralis major muscle, the breast tissue was sung laterally and a lateral pedicle identified where breast tissue gave way to fat of axilla. The lateral pedicle was incised, removed and the specimen was marked. Superiorly was a short suture and laterally was a long suture.  ?  A large elliptical skin incision was made to?the Right?breast tissue that included the nipple-areolar complex. Flaps were raised in the?avascular plane between subcutaneous tissue and breast tissue was used for to raise flaps from the clavicle superiorly, the sternum medially, the anterior rectus sheath inferiorly, and the lateral border of the pectoralis major muscle laterally. Hemostasis was maintained in the flaps. Next, the breast tissue and underlying pectoralis fascia were excised from the pectoralis major muscle, progressing from medial to lateral. At the lateral border of the pectoralis major muscle, the breast tissue was sung laterally and a lateral pedicle identified where breast tissue gave way to fat of axilla. The lateral pedicle was incised, removed and the specimen was marked. Superiorly was a short suture and laterally was a long suture.  ?  Right?[place] Lymph Node Biopsy was then performed  The lateral?border of  the pectoralis of the?Right?axilla was excised and the axilla was entered. Using a?hand-held SentiMag Probe?the axilla was assessed for a sentinel lymph node.  Right?axillary sentinel lymph node #1,?palpable, no MagTrace count or brown dye identified  Right?axillary sentinel lymph node #2,?palpable, no MagTrace count or brown dye identified  Right?axillary sentinel lymph node #3,?palpable, no MagTrace count or brown dye identified  Right?axillary sentinel lymph node #4,?palpable, no MagTrace count or brown dye identified  ?  The substances used?for sentinel lymph node biopsy in this patient: ?was MagTrace  Number of lymph nodes with MagTrace signal and brown dye:?_  Number of lymph nodes removed with?Isosulfan (or Methylene) blue dye and TC-99:?_  Number of lymph nodes removed with only?TC-99 signal:?_  Number of lymph nodes removed with only?Isosulfan (or Methylene) blue dye:?_  Number of lymph nodes removed with only palpable: ?4  ?  The sentinel lymph nodes were not sent to Pathologist for intra-operative Frozen Section and final pathology review will be performed.  ?  No further dissection was undertaken.  ?  The?cavities were?irrigated and hemostasis was obtained.?Via a remote separate incision, a 15-[place] round Timmy-Goff (RODOLFO) was placed and secured with a 3-0 Nylon yelena sandal suture.  ?  The subdermal?layers of both mastectomy flaps were?closed?with 3-0?Monocryl and 4-0 subcuticular running skin closures.?Exofin?was applied?followed by sterile dressings.  ?  The?patient was awakened from anesthesia and taken to the postanesthesia care unit in stable condition.?  ?  The skilled assistance of the Physician Assistant, Sirena Lee PA-C, was necessary for the successful completion of this case. She was essential for proper positioning of the patient, manipulation of instruments, proper exposure, manipulation of tissue, and wound closure.?

## 2022-05-14 NOTE — H&P
Admission H&P Update - Breast Surgery Note  ?  Riana Victoriaon?initially presents on?03/29/22?at?age?66 Years?with?a past history of left breast cancer diagnosed in 2005 SP lumpectomy/SLNB, XRT, and 5 years of Tamoxifen as well as recent mucinous cystosarcoma SP BSO diagnosed in 2017 who now presents with?bilateral breast cancer. (1)?AJCC 8th ed clinical anatomic?stage ?IA / prognostic stage ?IA?(cT1b?cN0?M0) Right?breast 3 oclock subareolar?invasive ductal carcinoma,?grade 1,?%,  %, HER2?0 - negative on IHC?and Ki67 58%.?(2)?AJCC 8th ed clinical anatomic?stage ?0 / prognostic stage 0?(cTis?cN0?M0) Left?breast central?posterior depth?ductal carcinoma in situ,?grade 1,?% and %.?  ?  ?  The H&P was reviewed, the patient was examined and there are no changes to the patients condition.  ?  ?  ?   A/P:  1. Bilateral Simple Mastectomy with R SLNB and Left MagTrace for possible SLNB  ?  All of her questions were answered.  ?  Getrrude Trejo MD

## 2022-05-16 ENCOUNTER — TELEPHONE (OUTPATIENT)
Dept: INTERNAL MEDICINE | Facility: CLINIC | Age: 67
End: 2022-05-16
Payer: MEDICARE

## 2022-05-16 NOTE — TELEPHONE ENCOUNTER
----- Message from KYLE Waldrop sent at 5/16/2022  9:55 AM CDT -----  Please contact the patient and let them know that their labs for thyroid were fine and do not require any change in treatment.    Spoke w/pt verbalized understanding.

## 2022-05-17 ENCOUNTER — PATIENT OUTREACH (OUTPATIENT)
Dept: ADMINISTRATIVE | Facility: HOSPITAL | Age: 67
End: 2022-05-17
Payer: MEDICARE

## 2022-05-17 NOTE — PROGRESS NOTES
Population Health Outreach.Records Received, hyper-linked into chart at this time. The following record(s)  below were uploaded for Health Maintenance .             2/7/22 MAMMOGRAM SCREENING

## 2022-05-17 NOTE — PROGRESS NOTES
Population Health Outreach.Records Received, hyper-linked into chart at this time. The following record(s)  below were uploaded for Health Maintenance .               11/16/2018 COLONOSCOPY          no

## 2022-05-18 NOTE — PROGRESS NOTES
[facility] Breast Surg  Breast Surgical Oncology  Post-Op Patient Office Visit         Referring Provider: Dr. Judi Richards (MED ONC)  PCP: Chely Cameron MD   Care Team:   MED ONC: Dr. Judi Richards     Chief Complaint:        Chief Complaint   Patient presents with    Follow up visit- delayed surgical wound healing        Follow up visit- delayed surgical wound healing          Subjective:   Treatment History:  Left Breast Cancer Dx in 2005:  1. Left Lumpectomy/SLNB  2. XRT  3. 5 years of Tamoxifen   4. 5/12/2022- Wound Debridement  of the Lateral Right Mastectomy Incision in  the Office     Bilateral Breast Cancer Diagnosed in 2022  1. Bilateral Simple Mastectomy and Right [place] Lymph Node Biopsy 4/25/2022     Interval History:  5/20/2022 - Riana Pastrana is here today for follow up visit due to delayed healing around the lateral Right mastectomy site.  She has completed her bactrim about 3 days ago, as prescribed.  She has been wearing her ace wrap for compression.         HPI:  Riana Pastrana initially presents on 03/29/22 at age 66 Years with a past history of left breast cancer diagnosed in 2005 SP lumpectomy/SLNB, XRT, and 5 years of Tamoxifen as well as recent mucinous cystosarcoma SP BSO diagnosed in 2017 who now presents with bilateral breast cancer. (1) AJCC 8th ed clinical anatomic stage IA / prognostic stage IA (cT1b cN0 M0) Right breast 3 o'clock subareolar invasive ductal carcinoma, grade 1, %,  %, HER2 0 - negative on IHC and Ki67 58%. (2) AJCC 8th ed clinical anatomic stage 0 / prognostic stage 0 (cTis cN0 M0) Left breast central posterior depth ductal carcinoma in situ, grade 1, % and %.     A detailed patient history was obtained and reviewed.      She is s/p bilateral simple mastectomy and right sentinel lymph node biopsy. Surgical pathology of the right breast revealed invasive ductal carcinoma grade 1  measuring 4 mm. Margins negative. Four right sentinel lymph nodes are negative for malignancy (0/4). Surgical pathology of the left breast revealed central region/6:00 biopsy site with diminutive focus of residual low grade DCIS. Margins also negative.       Imagin. 2022 BL SC MG at [facility]-which revealed on R MG lower inner quadrant anterior depth, there are two focal asymmetries. On L MG central region posterior depth there are indeterminate calcifications. At 12 o'clock left breast middle depth, there is a focal asymmetry with calcifications. There are stable post-therapy changes of the left breast. No detrimental change at the lumpectomy bed. There are stable post-core needle biopsy changes and a jesús shaped clip in the right breast. No detrimental change at this biopsy site. No other significant masses, calcifications, or other findings are seen in either breast. BIRADS-0 ; additional imaging needed.    2. 2022 BL DG MG/ BL US BREAST LIMITED at [facility]- which revealed on R MG at 4 o'clock subareolar anterior there is a 0.7 cm oval, circumscribed mass. On L MG 12 o'clock, anterior depth, there is a 1.2 cm focal asymmetry with associated heterogeneous calcifications. These findings correspond to the areas recalled on the screening examination dated 2022. No other suspicious masses, calcifications, or other signs of malignancy are identified. On R US, at 3 o'clock subareolar there is a 0.6 x 0.4 x 0.4 cm oval, hypoechoic mass with indistinct margins. At 4 o'clock subareolar right breast, there is a 0.7 x 0.3 x 0.6 cm benign, simple cyst. These correspond to the mammographic findings in these regions. No other suspicious sonographic abnormality is seen. Specifically, no suspicious sonographic correlate is seen for the focal asymmetry in the 12 o'clock left breast. Benign-appearing lymph nodes are noted in the bilateral axillae. BIRADS-4 suspicious; biopsy recommended.     Pathology:  1. 3/16/2022  Ultrasound-guided Core Needle Biopsy Right Breast 3 o'clock Subareolar Mass - Invasive ductal carcinoma, grade 1  %  %  HER2 Negative  Ki67 - 58% High    2.3/16/2022 Stereotactic guided Core Needle Biopsy Left Breast Posterior Depth Amorphous Grouped Calcifications Central Region - Ductal carcinoma in situ, grade 1  %  [place] 99%    3. 3/16/2022 Stereotactic guided Core Needle Biopsy Left Breast 12 o'clock Anterior Depth Focal Asymmetry with Heterogenous Calcifications - Sclerotic fibroadenomatoid nodule with dystropic calcification     4. 4/25/2022 Bilateral Simple Mastectomy and Right SLNB - Surgical pathology of the right breast revealed invasive ductal carcinoma grade 1 measuring 4 mm. Margins negative. Four right sentinel lymph nodes are negative for malignancy (0/4). Surgical pathology of the left breast revealed central region/6:00 biopsy site with diminutive focus of residual low grade DCIS. Margins also negative.      OB/GYN History:  Menarche Onset: 13  Menopause: Post, at age:  Hormonal birth control (duration): yes  Pregnancies: 3  Age at first pregnancy: ?  Child births: 2  Hysterectomy: yes, partial hysterectomy at age 29  Oophorectomy: yes, 2017 after diagnosis of adnexal mass suspicious - final pathology after BSO was mucinous cystosarcoma  HRT: no     Other:  # of breast biopsies (when and pathology results): 1 Left breast cancer in 2005  MG breast density: Category B (Scattered fibroglandular)  Prior thoracic RT: none  Genetic testing: none  Ashkenazi Christian descent: No     Family History:        Family History   Problem Relation Age of Onset    Ovarian cancer Sister 50         Patient History:       Past Medical History:   Diagnosis Date    History of bilateral breast cancer       left in 2007; bilateral in 2022               Past Surgical History:   Procedure Laterality Date    BREAST BIOPSY        BREAST SURGERY        HYSTERECTOMY        TONSILLECTOMY             Social  History              Socioeconomic History    Marital status:    Tobacco Use    Smoking status: Never Smoker    Smokeless tobacco: Never Used   Substance and Sexual Activity    Alcohol use: Never    Drug use: Never               There is no immunization history on file for this patient.     Medications/Allergies:         Current Outpatient Medications on File Prior to Visit   Medication Sig Dispense Refill    atorvastatin (LIPITOR) 20 MG tablet Take 20 mg by mouth.        bisacodyL (DULCOLAX) 5 mg EC tablet Take 5 mg by mouth daily as needed.        furosemide (LASIX) 20 MG tablet Take 20 mg by mouth.        hydroCHLOROthiazide (HYDRODIURIL) 25 MG tablet Take 25 mg by mouth.        levothyroxine (SYNTHROID) 50 MCG tablet Take 1 tablet (50 mcg total) by mouth before breakfast. 90 tablet 1    metoprolol tartrate (LOPRESSOR) 25 MG tablet          potassium chloride SA (K-DUR,KLOR-CON) 20 MEQ tablet            No current facility-administered medications on file prior to visit.               Review of patient's allergies indicates:   Allergen Reactions    Penicillins Other (See Comments)       UNKNOWN REACTION. ALLERGY SINCE CHILDHOOD            Review of Systems:  A comprehensive review of systems was negative.     Objective:      Vitals:  Vitals:    05/20/22 0847   BP: (!) 152/83   Pulse: 65   Resp: 18   Temp: 97.4 °F (36.3 °C)     Body mass index is 36.66 kg/m².      Physical Exam:  General: The patient is awake, alert and oriented times three. The patient is well nourished and in no acute distress.    Musculoskeletal: The patient has a normal range of motion of her bilateral upper extremities.    Breast: The ertheyma that was located superior to the bilateral mastectomy incisions has subsided. There is delayed healing noted to the lateral right mastectomy incision. It has improved since last visit. The area of concern is pink in color and the wound opening is smaller.  No tenderness or swelling  present.   Integumentary: no rashes or skin lesions present  Neurologic: cranial nerves intact, no signs of peripheral neurological deficit, motor/sensory function intact        Assessment and Plan:         Riana was seen today for delayed wound healing.     Diagnoses and all orders for this visit:     History of bilateral mastectomy     Delayed surgical wound healing, initial encounter     Ductal carcinoma in situ (DCIS) of left breast     5/20/2022 - Riana Pastrana is here today for follow up visit due to delayed healing around the lateral Right mastectomy site.  She has completed her bactrim about 3 days ago, as prescribed.  She has been wearing her ace wrap for compression.     ---------------------------------------------------------------------------------------------------------------      Plan:         1. Dress the wound with mesalts and gauze BID. Keep C/D/I.   2. RTC in 2 week to recheck.   3. Call the office with any questions or concerns that may arise before follow up.     All of her questions were answered.     JESSICA Nj

## 2022-05-20 ENCOUNTER — OFFICE VISIT (OUTPATIENT)
Dept: SURGERY | Facility: CLINIC | Age: 67
End: 2022-05-20
Payer: MEDICARE

## 2022-05-20 VITALS
RESPIRATION RATE: 18 BRPM | SYSTOLIC BLOOD PRESSURE: 152 MMHG | HEART RATE: 65 BPM | HEIGHT: 64 IN | WEIGHT: 214.81 LBS | BODY MASS INDEX: 36.67 KG/M2 | TEMPERATURE: 97 F | OXYGEN SATURATION: 98 % | DIASTOLIC BLOOD PRESSURE: 83 MMHG

## 2022-05-20 DIAGNOSIS — T14.8XXD DELAYED WOUND HEALING: ICD-10-CM

## 2022-05-20 DIAGNOSIS — Z90.13 HISTORY OF BILATERAL MASTECTOMY: Primary | ICD-10-CM

## 2022-05-20 DIAGNOSIS — D05.12 DUCTAL CARCINOMA IN SITU (DCIS) OF LEFT BREAST: ICD-10-CM

## 2022-05-20 PROCEDURE — 3008F PR BODY MASS INDEX (BMI) DOCUMENTED: ICD-10-PCS | Mod: CPTII,S$GLB,,

## 2022-05-20 PROCEDURE — 99999 PR PBB SHADOW E&M-EST. PATIENT-LVL IV: CPT | Mod: PBBFAC,,,

## 2022-05-20 PROCEDURE — 3079F PR MOST RECENT DIASTOLIC BLOOD PRESSURE 80-89 MM HG: ICD-10-PCS | Mod: CPTII,S$GLB,,

## 2022-05-20 PROCEDURE — 1126F AMNT PAIN NOTED NONE PRSNT: CPT | Mod: CPTII,S$GLB,,

## 2022-05-20 PROCEDURE — 3077F SYST BP >= 140 MM HG: CPT | Mod: CPTII,S$GLB,,

## 2022-05-20 PROCEDURE — 1126F PR PAIN SEVERITY QUANTIFIED, NO PAIN PRESENT: ICD-10-PCS | Mod: CPTII,S$GLB,,

## 2022-05-20 PROCEDURE — 3079F DIAST BP 80-89 MM HG: CPT | Mod: CPTII,S$GLB,,

## 2022-05-20 PROCEDURE — 1160F RVW MEDS BY RX/DR IN RCRD: CPT | Mod: CPTII,S$GLB,,

## 2022-05-20 PROCEDURE — 1159F MED LIST DOCD IN RCRD: CPT | Mod: CPTII,S$GLB,,

## 2022-05-20 PROCEDURE — 1159F PR MEDICATION LIST DOCUMENTED IN MEDICAL RECORD: ICD-10-PCS | Mod: CPTII,S$GLB,,

## 2022-05-20 PROCEDURE — 3008F BODY MASS INDEX DOCD: CPT | Mod: CPTII,S$GLB,,

## 2022-05-20 PROCEDURE — 99999 PR PBB SHADOW E&M-EST. PATIENT-LVL IV: ICD-10-PCS | Mod: PBBFAC,,,

## 2022-05-20 PROCEDURE — 99213 OFFICE O/P EST LOW 20 MIN: CPT | Mod: S$GLB,,,

## 2022-05-20 PROCEDURE — 3077F PR MOST RECENT SYSTOLIC BLOOD PRESSURE >= 140 MM HG: ICD-10-PCS | Mod: CPTII,S$GLB,,

## 2022-05-20 PROCEDURE — 99213 PR OFFICE/OUTPT VISIT, EST, LEVL III, 20-29 MIN: ICD-10-PCS | Mod: S$GLB,,,

## 2022-05-20 PROCEDURE — 1160F PR REVIEW ALL MEDS BY PRESCRIBER/CLIN PHARMACIST DOCUMENTED: ICD-10-PCS | Mod: CPTII,S$GLB,,

## 2022-05-30 NOTE — PROGRESS NOTES
[facility] Breast Surg  Breast Surgical Oncology  Post-Op Patient Office Visit         Referring Provider: Dr. Judi Richards (MED ONC)  PCP: Chely Cameron MD   Care Team:   MED ONC: Dr. Judi Richards     Chief Complaint:        Chief Complaint   Patient presents with    Follow up visit- delayed surgical wound healing        Follow up visit- delayed surgical wound healing          Subjective:   Treatment History:  Left Breast Cancer Dx in 2005:  1. Left Lumpectomy/SLNB  2. XRT  3. 5 years of Tamoxifen   4. 5/12/2022- Wound Debridement  of the Lateral Right Mastectomy Incision in  the Office     Bilateral Breast Cancer Diagnosed in 2022  1. Bilateral Simple Mastectomy and Right [place] Lymph Node Biopsy 4/25/2022     Interval History:  06/03/2022 - Riana Pastrana is here today for follow up visit due to delayed healing around the lateral Right mastectomy site.  She has completed her bactrim, as prescribed.  She has been dressing the wound with mesalts and paper tape over the past two weeks. The wound has significantly improved and is pretty much healed. ( image uploaded into chart).         HPI:  Riana Pastrana initially presents on 03/29/22 at age 66 Years with a past history of left breast cancer diagnosed in 2005 SP lumpectomy/SLNB, XRT, and 5 years of Tamoxifen as well as recent mucinous cystosarcoma SP BSO diagnosed in 2017 who now presents with bilateral breast cancer. (1) AJCC 8th ed clinical anatomic stage IA / prognostic stage IA (cT1b cN0 M0) Right breast 3 o'clock subareolar invasive ductal carcinoma, grade 1, %,  %, HER2 0 - negative on IHC and Ki67 58%. (2) AJCC 8th ed clinical anatomic stage 0 / prognostic stage 0 (cTis cN0 M0) Left breast central posterior depth ductal carcinoma in situ, grade 1, % and %.     A detailed patient history was obtained and reviewed.      She is s/p bilateral simple mastectomy and right  sentinel lymph node biopsy. Surgical pathology of the right breast revealed invasive ductal carcinoma grade 1 measuring 4 mm. Margins negative. Four right sentinel lymph nodes are negative for malignancy (0/4). Surgical pathology of the left breast revealed central region/6:00 biopsy site with diminutive focus of residual low grade DCIS. Margins also negative.       Imagin. 2022 BL SC MG at [facility]-which revealed on R MG lower inner quadrant anterior depth, there are two focal asymmetries. On L MG central region posterior depth there are indeterminate calcifications. At 12 o'clock left breast middle depth, there is a focal asymmetry with calcifications. There are stable post-therapy changes of the left breast. No detrimental change at the lumpectomy bed. There are stable post-core needle biopsy changes and a jesús shaped clip in the right breast. No detrimental change at this biopsy site. No other significant masses, calcifications, or other findings are seen in either breast. BIRADS-0 ; additional imaging needed.    2. 2022 BL DG MG/ BL US BREAST LIMITED at [facility]- which revealed on R MG at 4 o'clock subareolar anterior there is a 0.7 cm oval, circumscribed mass. On L MG 12 o'clock, anterior depth, there is a 1.2 cm focal asymmetry with associated heterogeneous calcifications. These findings correspond to the areas recalled on the screening examination dated 2022. No other suspicious masses, calcifications, or other signs of malignancy are identified. On R US, at 3 o'clock subareolar there is a 0.6 x 0.4 x 0.4 cm oval, hypoechoic mass with indistinct margins. At 4 o'clock subareolar right breast, there is a 0.7 x 0.3 x 0.6 cm benign, simple cyst. These correspond to the mammographic findings in these regions. No other suspicious sonographic abnormality is seen. Specifically, no suspicious sonographic correlate is seen for the focal asymmetry in the 12 o'clock left breast. Benign-appearing lymph nodes  are noted in the bilateral axillae. BIRADS-4 suspicious; biopsy recommended.     Pathology:  1. 3/16/2022 Ultrasound-guided Core Needle Biopsy Right Breast 3 o'clock Subareolar Mass - Invasive ductal carcinoma, grade 1  %  %  HER2 Negative  Ki67 - 58% High    2.3/16/2022 Stereotactic guided Core Needle Biopsy Left Breast Posterior Depth Amorphous Grouped Calcifications Central Region - Ductal carcinoma in situ, grade 1  %  [place] 99%    3. 3/16/2022 Stereotactic guided Core Needle Biopsy Left Breast 12 o'clock Anterior Depth Focal Asymmetry with Heterogenous Calcifications - Sclerotic fibroadenomatoid nodule with dystropic calcification     4. 4/25/2022 Bilateral Simple Mastectomy and Right SLNB - Surgical pathology of the right breast revealed invasive ductal carcinoma grade 1 measuring 4 mm. Margins negative. Four right sentinel lymph nodes are negative for malignancy (0/4). Surgical pathology of the left breast revealed central region/6:00 biopsy site with diminutive focus of residual low grade DCIS. Margins also negative.      OB/GYN History:  Menarche Onset: 13  Menopause: Post, at age:  Hormonal birth control (duration): yes  Pregnancies: 3  Age at first pregnancy: ?  Child births: 2  Hysterectomy: yes, partial hysterectomy at age 29  Oophorectomy: yes, 2017 after diagnosis of adnexal mass suspicious - final pathology after BSO was mucinous cystosarcoma  HRT: no     Other:  # of breast biopsies (when and pathology results): 1 Left breast cancer in 2005  MG breast density: Category B (Scattered fibroglandular)  Prior thoracic RT: none  Genetic testing: none  Ashkenazi Sabianist descent: No     Family History:        Family History   Problem Relation Age of Onset    Ovarian cancer Sister 50         Patient History:       Past Medical History:   Diagnosis Date    History of bilateral breast cancer       left in 2007; bilateral in 2022               Past Surgical History:   Procedure Laterality Date     BREAST BIOPSY        BREAST SURGERY        HYSTERECTOMY        TONSILLECTOMY             Social History              Socioeconomic History    Marital status:    Tobacco Use    Smoking status: Never Smoker    Smokeless tobacco: Never Used   Substance and Sexual Activity    Alcohol use: Never    Drug use: Never               There is no immunization history on file for this patient.     Medications/Allergies:         Current Outpatient Medications on File Prior to Visit   Medication Sig Dispense Refill    atorvastatin (LIPITOR) 20 MG tablet Take 20 mg by mouth.        bisacodyL (DULCOLAX) 5 mg EC tablet Take 5 mg by mouth daily as needed.        furosemide (LASIX) 20 MG tablet Take 20 mg by mouth.        hydroCHLOROthiazide (HYDRODIURIL) 25 MG tablet Take 25 mg by mouth.        levothyroxine (SYNTHROID) 50 MCG tablet Take 1 tablet (50 mcg total) by mouth before breakfast. 90 tablet 1    metoprolol tartrate (LOPRESSOR) 25 MG tablet          potassium chloride SA (K-DUR,KLOR-CON) 20 MEQ tablet            No current facility-administered medications on file prior to visit.               Review of patient's allergies indicates:   Allergen Reactions    Penicillins Other (See Comments)       UNKNOWN REACTION. ALLERGY SINCE CHILDHOOD            Review of Systems:  A comprehensive review of systems was negative.     Objective:      Vitals:  Vitals:    06/03/22 0939   BP: 127/78   Pulse: 60   Resp: 18   Temp: 97.9 °F (36.6 °C)     Body mass index is 36.66 kg/m².      Physical Exam:  General: The patient is awake, alert and oriented times three. The patient is well nourished and in no acute distress.    Musculoskeletal: The patient has a normal range of motion of her bilateral upper extremities.    Breast: There is delayed healing noted to the lateral right mastectomy incision. It has significantly improved since last visit and is pretty much healed. No tenderness erythema or swelling present. (image  uploaded into chart). The left mastectomy incision has healed. Patient denies any discharge to the lateral right mastectomy incision site.   Integumentary: no rashes or skin lesions present  Neurologic: cranial nerves intact, no signs of peripheral neurological deficit, motor/sensory function intact        Assessment and Plan:        Riana was seen today for delayed wound healing.     Diagnoses and all orders for this visit:     History of bilateral mastectomy     Delayed surgical wound healing, subsequent encounter     Ductal carcinoma in situ (DCIS) of left breast     ---------------------------------------------------------------------------------------------------------------  06/03/2022 - Riana Pastrana is here today for follow up visit due to delayed healing around the lateral Right mastectomy site.  She has completed her bactrim, as prescribed.  She has been dressing the wound with mesalts and paper tape over the past two weeks. The wound has significantly improved and is pretty much healed. ( image uploaded into chart).     Plan:      1. Continue to dress the area of concern with mesalts and gauze BID until the scab has fallen off and the area of concern looks like the rest of her incision. Keep the area C/D/I.   2. RTC in August to follow up with ISABEL Ludwig in regards to her recent bilateral mastectomy and diagnosis of DCIS.   3. Call the office with any questions or concerns that may arise before follow up.  4. Educated the patient on signs and symptoms of delayed healing. Encouraged the patient to call our office if the area of concern begins to worsen.    5. Continue to follow with Dr. Richards, medical oncology.     All of her questions were answered.     JESSICA Nj

## 2022-06-03 ENCOUNTER — OFFICE VISIT (OUTPATIENT)
Dept: SURGERY | Facility: CLINIC | Age: 67
End: 2022-06-03
Payer: MEDICARE

## 2022-06-03 VITALS
TEMPERATURE: 98 F | WEIGHT: 216 LBS | OXYGEN SATURATION: 97 % | HEART RATE: 60 BPM | RESPIRATION RATE: 18 BRPM | HEIGHT: 64 IN | SYSTOLIC BLOOD PRESSURE: 127 MMHG | BODY MASS INDEX: 36.88 KG/M2 | DIASTOLIC BLOOD PRESSURE: 78 MMHG

## 2022-06-03 DIAGNOSIS — Z90.13 HISTORY OF BILATERAL MASTECTOMY: Primary | ICD-10-CM

## 2022-06-03 DIAGNOSIS — T81.89XD DELAYED SURGICAL WOUND HEALING, SUBSEQUENT ENCOUNTER: ICD-10-CM

## 2022-06-03 DIAGNOSIS — D05.12 DUCTAL CARCINOMA IN SITU (DCIS) OF LEFT BREAST: ICD-10-CM

## 2022-06-03 PROCEDURE — 1126F PR PAIN SEVERITY QUANTIFIED, NO PAIN PRESENT: ICD-10-PCS | Mod: CPTII,S$GLB,,

## 2022-06-03 PROCEDURE — 1159F MED LIST DOCD IN RCRD: CPT | Mod: CPTII,S$GLB,,

## 2022-06-03 PROCEDURE — 99213 PR OFFICE/OUTPT VISIT, EST, LEVL III, 20-29 MIN: ICD-10-PCS | Mod: S$GLB,,,

## 2022-06-03 PROCEDURE — 1160F PR REVIEW ALL MEDS BY PRESCRIBER/CLIN PHARMACIST DOCUMENTED: ICD-10-PCS | Mod: CPTII,S$GLB,,

## 2022-06-03 PROCEDURE — 99999 PR PBB SHADOW E&M-EST. PATIENT-LVL IV: ICD-10-PCS | Mod: PBBFAC,,,

## 2022-06-03 PROCEDURE — 3078F DIAST BP <80 MM HG: CPT | Mod: CPTII,S$GLB,,

## 2022-06-03 PROCEDURE — 3074F PR MOST RECENT SYSTOLIC BLOOD PRESSURE < 130 MM HG: ICD-10-PCS | Mod: CPTII,S$GLB,,

## 2022-06-03 PROCEDURE — 3008F BODY MASS INDEX DOCD: CPT | Mod: CPTII,S$GLB,,

## 2022-06-03 PROCEDURE — 3078F PR MOST RECENT DIASTOLIC BLOOD PRESSURE < 80 MM HG: ICD-10-PCS | Mod: CPTII,S$GLB,,

## 2022-06-03 PROCEDURE — 99213 OFFICE O/P EST LOW 20 MIN: CPT | Mod: S$GLB,,,

## 2022-06-03 PROCEDURE — 3008F PR BODY MASS INDEX (BMI) DOCUMENTED: ICD-10-PCS | Mod: CPTII,S$GLB,,

## 2022-06-03 PROCEDURE — 1160F RVW MEDS BY RX/DR IN RCRD: CPT | Mod: CPTII,S$GLB,,

## 2022-06-03 PROCEDURE — 99999 PR PBB SHADOW E&M-EST. PATIENT-LVL IV: CPT | Mod: PBBFAC,,,

## 2022-06-03 PROCEDURE — 3074F SYST BP LT 130 MM HG: CPT | Mod: CPTII,S$GLB,,

## 2022-06-03 PROCEDURE — 1159F PR MEDICATION LIST DOCUMENTED IN MEDICAL RECORD: ICD-10-PCS | Mod: CPTII,S$GLB,,

## 2022-06-03 PROCEDURE — 1126F AMNT PAIN NOTED NONE PRSNT: CPT | Mod: CPTII,S$GLB,,

## 2022-06-20 ENCOUNTER — OFFICE VISIT (OUTPATIENT)
Dept: HEMATOLOGY/ONCOLOGY | Facility: CLINIC | Age: 67
End: 2022-06-20
Payer: MEDICARE

## 2022-06-20 VITALS
DIASTOLIC BLOOD PRESSURE: 89 MMHG | HEART RATE: 69 BPM | HEIGHT: 64 IN | BODY MASS INDEX: 37.39 KG/M2 | OXYGEN SATURATION: 96 % | TEMPERATURE: 98 F | SYSTOLIC BLOOD PRESSURE: 163 MMHG | WEIGHT: 219 LBS

## 2022-06-20 DIAGNOSIS — C50.211 MALIGNANT NEOPLASM OF UPPER-INNER QUADRANT OF RIGHT BREAST IN FEMALE, ESTROGEN RECEPTOR POSITIVE: ICD-10-CM

## 2022-06-20 DIAGNOSIS — D05.12 DUCTAL CARCINOMA IN SITU (DCIS) OF LEFT BREAST: ICD-10-CM

## 2022-06-20 DIAGNOSIS — Z79.811 USE OF LETROZOLE (FEMARA): Primary | ICD-10-CM

## 2022-06-20 DIAGNOSIS — Z17.0 MALIGNANT NEOPLASM OF UPPER-INNER QUADRANT OF RIGHT BREAST IN FEMALE, ESTROGEN RECEPTOR POSITIVE: ICD-10-CM

## 2022-06-20 PROCEDURE — 3288F PR FALLS RISK ASSESSMENT DOCUMENTED: ICD-10-PCS | Mod: CPTII,S$GLB,, | Performed by: INTERNAL MEDICINE

## 2022-06-20 PROCEDURE — 1126F PR PAIN SEVERITY QUANTIFIED, NO PAIN PRESENT: ICD-10-PCS | Mod: CPTII,S$GLB,, | Performed by: INTERNAL MEDICINE

## 2022-06-20 PROCEDURE — 3077F PR MOST RECENT SYSTOLIC BLOOD PRESSURE >= 140 MM HG: ICD-10-PCS | Mod: CPTII,S$GLB,, | Performed by: INTERNAL MEDICINE

## 2022-06-20 PROCEDURE — 3077F SYST BP >= 140 MM HG: CPT | Mod: CPTII,S$GLB,, | Performed by: INTERNAL MEDICINE

## 2022-06-20 PROCEDURE — 3079F PR MOST RECENT DIASTOLIC BLOOD PRESSURE 80-89 MM HG: ICD-10-PCS | Mod: CPTII,S$GLB,, | Performed by: INTERNAL MEDICINE

## 2022-06-20 PROCEDURE — 1159F PR MEDICATION LIST DOCUMENTED IN MEDICAL RECORD: ICD-10-PCS | Mod: CPTII,S$GLB,, | Performed by: INTERNAL MEDICINE

## 2022-06-20 PROCEDURE — 3008F BODY MASS INDEX DOCD: CPT | Mod: CPTII,S$GLB,, | Performed by: INTERNAL MEDICINE

## 2022-06-20 PROCEDURE — 1160F RVW MEDS BY RX/DR IN RCRD: CPT | Mod: CPTII,S$GLB,, | Performed by: INTERNAL MEDICINE

## 2022-06-20 PROCEDURE — 99215 PR OFFICE/OUTPT VISIT, EST, LEVL V, 40-54 MIN: ICD-10-PCS | Mod: S$GLB,,, | Performed by: INTERNAL MEDICINE

## 2022-06-20 PROCEDURE — 1101F PT FALLS ASSESS-DOCD LE1/YR: CPT | Mod: CPTII,S$GLB,, | Performed by: INTERNAL MEDICINE

## 2022-06-20 PROCEDURE — 1159F MED LIST DOCD IN RCRD: CPT | Mod: CPTII,S$GLB,, | Performed by: INTERNAL MEDICINE

## 2022-06-20 PROCEDURE — 3288F FALL RISK ASSESSMENT DOCD: CPT | Mod: CPTII,S$GLB,, | Performed by: INTERNAL MEDICINE

## 2022-06-20 PROCEDURE — 3008F PR BODY MASS INDEX (BMI) DOCUMENTED: ICD-10-PCS | Mod: CPTII,S$GLB,, | Performed by: INTERNAL MEDICINE

## 2022-06-20 PROCEDURE — 99999 PR PBB SHADOW E&M-EST. PATIENT-LVL IV: ICD-10-PCS | Mod: PBBFAC,,, | Performed by: INTERNAL MEDICINE

## 2022-06-20 PROCEDURE — 99999 PR PBB SHADOW E&M-EST. PATIENT-LVL IV: CPT | Mod: PBBFAC,,, | Performed by: INTERNAL MEDICINE

## 2022-06-20 PROCEDURE — 1126F AMNT PAIN NOTED NONE PRSNT: CPT | Mod: CPTII,S$GLB,, | Performed by: INTERNAL MEDICINE

## 2022-06-20 PROCEDURE — 1101F PR PT FALLS ASSESS DOC 0-1 FALLS W/OUT INJ PAST YR: ICD-10-PCS | Mod: CPTII,S$GLB,, | Performed by: INTERNAL MEDICINE

## 2022-06-20 PROCEDURE — 3079F DIAST BP 80-89 MM HG: CPT | Mod: CPTII,S$GLB,, | Performed by: INTERNAL MEDICINE

## 2022-06-20 PROCEDURE — 1160F PR REVIEW ALL MEDS BY PRESCRIBER/CLIN PHARMACIST DOCUMENTED: ICD-10-PCS | Mod: CPTII,S$GLB,, | Performed by: INTERNAL MEDICINE

## 2022-06-20 PROCEDURE — 99215 OFFICE O/P EST HI 40 MIN: CPT | Mod: S$GLB,,, | Performed by: INTERNAL MEDICINE

## 2022-06-20 RX ORDER — LETROZOLE 2.5 MG/1
2.5 TABLET, FILM COATED ORAL DAILY
Qty: 30 TABLET | Refills: 2 | Status: SHIPPED | OUTPATIENT
Start: 2022-06-20 | End: 2022-09-12

## 2022-06-20 RX ORDER — LETROZOLE 2.5 MG/1
2.5 TABLET, FILM COATED ORAL DAILY
Qty: 30 TABLET | Refills: 2 | Status: CANCELLED | OUTPATIENT
Start: 2022-06-20 | End: 2023-06-20

## 2022-06-20 NOTE — PROGRESS NOTES
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 2022 BL SC MG at [facility]-which revealed on R MG lower inner quadrant anterior depth, there are two focal asymmetries. On L MG central region posterior depth there are indeterminate calcifications. At 12 o'clock left breast middle depth, there is a focal asymmetry with calcifications. There are stable post-therapy changes of the left breast. No detrimental change at the lumpectomy bed. There are stable post-core needle biopsy changes and a jesús shaped clip in the right breast. No detrimental change at this biopsy site. No other significant masses, calcifications, or other findings are seen in either breast. BIRADS-0 ; additional imaging needed.    2. 2022 BL DG MG/ BL US BREAST LIMITED at [facility]- which revealed on R MG at 4 o'clock subareolar anterior there is a 0.7 cm oval, circumscribed mass. On L MG 12 o'clock, anterior depth, there is a 1.2 cm focal asymmetry with associated heterogeneous calcifications. These findings correspond to the areas recalled on the screening examination dated 2022. No other suspicious masses, calcifications, or other signs of malignancy are identified. On R US, at 3  o'clock subareolar there is a 0.6 x 0.4 x 0.4 cm oval, hypoechoic mass with indistinct margins. At 4 o'clock subareolar right breast, there is a 0.7 x 0.3 x 0.6 cm benign, simple cyst. These correspond to the mammographic findings in these regions. No other suspicious sonographic abnormality is seen. Specifically, no suspicious sonographic correlate is seen for the focal asymmetry in the 12 o'clock left breast. Benign-appearing lymph nodes are noted in the bilateral axillae. BIRADS-4 suspicious; biopsy recommended.      Pathology:  3/16/2022:   [1] LEFT BREAST POSTERIOR DEPTH AMORPHOUS GROUPED CALCIFICATIONS CENTRAL REGION: DUCTAL CARCINOMA IN SITU, LOW GRADE CRIBRIFORM/MICROPAPILLARY TYPE, WITH MICROCALCIFICATIONS. DCIS is present on two core biopsies, the largest focus measuring less than 3 mm.   [2] LEFT BREAST 12 O' CLOCK ANTERIOR DEPTH FOCAL ASYMMETRY WITH HETEROGENOUS CALCIFICATIONS:SCLEROTIC FIBROADENOMATOID NODULE WITH DYSTROPIC CALCIFICATION.   [3] RIGHT BREAST 3 O' CLOCK SUBAREOLAR MASS: INVASIVE DUCTAL CARCINOMA, SOTO-ROMERO GRADE 1.  Carcinoma is present on two core biopsies and measures 5 mm.    %, %, Her2 neg, Ki67 58%.    4/25/2022:  [1]  BREAST, LEFT, SIMPLE MASTECTOMY: DIMINUTIVE FOCUS OF RESIDUAL LOW GRADE DUCTAL CARCINOMA IN SITU.  [2]  BREAST, RIGHT, SIMPLE MASTECTOMY: INVASIVE DUCTAL CARCINOMA, LOW GRADE, GRADE 1(OF 3).  -Tumor size:  4.0 mm. pT Category:  pT1a  pN0  BREAST, RIGHT, AXILLARY SENTINEL NODE #4/4: NEGATIVE FOR METASTATIC CARCINOMA.  The patient's previous history of low grade invasive ductal carcinoma is noted from surgical pathology report [id number] ER (100%), MI (100%), HER2 negative, Ki-67 high (58%).              CLINICAL HISTORY:       Patient: Riana Pastrana is a 66 y.o. female kindly referred for history of breast cancer.  I do not have record of her diagnosis and treatment of her original breast cancer.    Patient states that she was diagnosed with  breast cancer in 2005.  She had Left lumpectomy done, with no chemotherapy or radiation. She took tamoxifen for 5 years. She reports that she was treated by Dr. Robbin Kenny. She says that she was seen at [facility], but since her insurance has changed she would like to establish care here.     Patient reports menarche at 13. She had 3 pregnancies, 2 live children, 1 miscarriage. Her first child was at age 20.She had a partial hysterectomy at age 29. She admits to oral contraceptives for about 3 years. No hormone replacement therapy.     Her sister had ovarian cancer diagnosed in her 50's, currently still alive at age 80.    She reports that she had a mass in her stomach and it was removed in 2017 along with her ovaries at [facility].  Again I do not have the records, but imaging studies none here include CT scan of the abdomen and pelvis done on 5/21/2017 for an abdominal distention showed a 22 x 21 x 16 cm complex septated peripherally enhancing mass which appears to arise from the right adnexa, presumably ovarian in origin.  Uterus not identified.  Cystic mass in the abdomen and pelvis displaces the urinary bladder inferiorly.  Liver is is heterogeneous in attenuation and contains 2 low-attenuation masses in the dome, too small to accurately characterize by CT.  Spleen normal in size. CT of the chest showed cardiomegaly with moderate pericardial effusion.  Bilateral lower lobe consolidation with atelectasis and bilateral pleural effusions, left greater than right. Large abdominal cystic mass of unknown etiology. Further assessment with dedicated imaging of the abdomen and pelvis recommended. US pelvis 5/22/2021:  A large, multilocular mass consistent with the CT findings is identified measuring 27.5 x 20.1 x 19.6 cm. There are multiple septations or separate fluid loculations in the large cystic mass. Low level internal echogenicity is also visible in the cyst fluid with dependent debris also  identified. No free pelvic fluid is visible. This lesion is suspicious for neoplasm of pelvic and likely ovarian origin. The uterus is not identified and surgically absent by patient history. 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  Ascites  suggestive of peritoneal implants,  the ascites is new from the previous study. No Path available    On 2/7/2022 screening mammogram: INCOMPLETE: NEEDS ADDITIONAL IMAGING EVALUATION  Right breast focal asymmetries, left breast calcifications, and left breast focal asymmetry with calcifications need further evaluation. BI-RADS 0: Incomplete. Need additional imaging evaluation.     On 2/24/2022 Diagnostic right mammogram and Breast US: SUSPICIOUS OF MALIGNANCY  1. Oval, hypoechoic mass with indistinct margins in the 3:00 subareolar right breast is suspicious. Right breast ultrasound-guided biopsy is recommended.  2. Amorphous, grouped calcifications in the central left breast, posterior depth, are suspicious. Left breast stereotactic guided biopsy is recommended.  3. Focal asymmetry with associated heterogeneous calcifications in the 12:00 left breast, anterior depth, is suspicious. Left breast stereotactic guided biopsy is recommended.     On 3/16/2022 she is schedule for breast bx. otherwise she is doing well and voices no concerns.  No fever, chills, sweats.  No chest pain or shortness of breath.  No changes in bowel habits.  No abdominal or pelvic pain.  No neurological symptoms.    [1] LEFT BREAST POSTERIOR DEPTH AMORPHOUS GROUPED CALCIFICATIONS CENTRAL REGION:  DUCTAL CARCINOMA IN SITU, LOW GRADE CRIBRIFORM/MICROPAPILLARY TYPE, WITH MICROCALCIFICATIONS.  COMMENT: DCIS is present on two core biopsies, the largest focus measuring less than 3 mm. The results of ER/RI analysis will be the subject of an addendum report.  [2] LEFT BREAST 12 O' CLOCK ANTERIOR DEPTH FOCAL ASYMMETRY WITH HETEROGENOUS CALCIFICATIONS:  SCLEROTIC  FIBROADENOMATOID NODULE WITH DYSTROPIC CALCIFICATION.  [3] RIGHT BREAST 3 O' CLOCK SUBAREOLAR MASS:  INVASIVE DUCTAL CARCINOMA, SOTO-ROMERO GRADE 1.  COMMENT: Carcinoma is present on two core biopsies and measures 5 mm.    %, %, Her2 neg, Ki67 58%.    Chief Complaint: No Concerns today      Interval History:     Patient presents today for follow up to discuss Oncotype results, her  is present. She underwent bilateral mastectomies 4/25/2022. Overall,  she has been doing well. She denies any fever, chills, sweats. No chest pain or shortness of breath.  No changes in bowel habits.    Oncotype showed a recurrence score of 18 with a distant recurrent risk at 10 years of 5% with AI or tamoxifen alone and <1% absolute chemotherapy benefit.  She will be started on aromatase inhibitors.      Past Medical History:   Diagnosis Date    History of bilateral breast cancer     left in 2007; bilateral in 2022    Hypertension       Past Surgical History:   Procedure Laterality Date    BREAST BIOPSY      BREAST SURGERY      HYSTERECTOMY      TONSILLECTOMY       Family History   Problem Relation Age of Onset    Ovarian cancer Sister 50     Social Connections: Not on file       Review of patient's allergies indicates:   Allergen Reactions    Penicillins Other (See Comments)     UNKNOWN REACTION. ALLERGY SINCE CHILDHOOD        Current Outpatient Medications on File Prior to Visit   Medication Sig Dispense Refill    atorvastatin (LIPITOR) 20 MG tablet Take 20 mg by mouth once daily.      cholecalciferol, vitamin D3, 100 mcg (4,000 unit) Cap capsule Take 400 Units by mouth once daily. 2 po daily      furosemide (LASIX) 20 MG tablet Take 20 mg by mouth once daily at 6am.      hydroCHLOROthiazide (HYDRODIURIL) 25 MG tablet Take 25 mg by mouth once daily.      levothyroxine (SYNTHROID) 50 MCG tablet Take 1 tablet (50 mcg total) by mouth before breakfast. 90 tablet 1    metoprolol succinate (TOPROL-XL) 25  "MG 24 hr tablet Take 25 mg by mouth.      metoprolol tartrate (LOPRESSOR) 25 MG tablet 2 (two) times daily.      multivit-min/iron/folic/lutein (CENTRUM SILVER WOMEN ORAL) Take 1 tablet by mouth once daily.      potassium chloride SA (K-DUR,KLOR-CON) 20 MEQ tablet Take 20 mEq by mouth once daily.       No current facility-administered medications on file prior to visit.      Review of Systems   Constitutional: Negative for activity change, appetite change, chills, diaphoresis, fatigue, fever and unexpected weight change.   HENT: Negative for mouth dryness, mouth sores, nosebleeds, postnasal drip, sinus pressure/congestion, sore throat and trouble swallowing.    Eyes: Negative for visual disturbance.   Respiratory: Negative for apnea, cough, choking, chest tightness and shortness of breath.    Cardiovascular: Negative for chest pain, palpitations and leg swelling.   Gastrointestinal: Negative for abdominal distention, abdominal pain, blood in stool, change in bowel habit, constipation, diarrhea, nausea, vomiting and change in bowel habit.   Endocrine: Negative.    Genitourinary: Negative for difficulty urinating, dysuria, frequency, hematuria and urgency.   Musculoskeletal: Negative for arthralgias, back pain, myalgias and neck pain.   Integumentary:  Negative for rash, breast mass, breast discharge and breast tenderness.   Neurological: Negative for dizziness, tremors, syncope, speech difficulty, weakness, light-headedness, numbness, headaches and memory loss.   Hematological: Does not bruise/bleed easily.   Psychiatric/Behavioral: Negative for confusion, hallucinations, sleep disturbance and suicidal ideas.   Breast: Negative for mass and tenderness             Vitals:    06/20/22 1302   BP: (!) 163/89   BP Location: Right arm   Patient Position: Sitting   Pulse: 69   Temp: 97.8 °F (36.6 °C)   SpO2: 96%   Weight: 99.3 kg (219 lb)   Height: 5' 4" (1.626 m)      Physical Exam  Vitals and nursing note reviewed. "   Constitutional:       General: She is not in acute distress.     Appearance: Normal appearance. She is well-developed.   HENT:      Head: Normocephalic and atraumatic.      Mouth/Throat:      Mouth: Mucous membranes are moist.   Eyes:      General: No scleral icterus.     Extraocular Movements: Extraocular movements intact.      Conjunctiva/sclera: Conjunctivae normal.      Pupils: Pupils are equal, round, and reactive to light.   Neck:      Vascular: No JVD.   Cardiovascular:      Rate and Rhythm: Normal rate and regular rhythm.      Heart sounds: No murmur heard.  Pulmonary:      Effort: Pulmonary effort is normal.      Breath sounds: Normal breath sounds. No wheezing or rhonchi.   Chest:      Chest wall: No deformity or tenderness.   Breasts:      Right: Absent. No swelling, mass, skin change, tenderness, axillary adenopathy or supraclavicular adenopathy.      Left: Absent. No swelling, mass, skin change, tenderness, axillary adenopathy or supraclavicular adenopathy.       Abdominal:      General: Bowel sounds are normal. There is no distension.      Palpations: Abdomen is soft. There is no mass.      Tenderness: There is no abdominal tenderness.   Musculoskeletal:         General: No swelling or deformity.      Cervical back: Neck supple.   Lymphadenopathy:      Cervical: No cervical adenopathy.      Upper Body:      Right upper body: No supraclavicular or axillary adenopathy.      Left upper body: No supraclavicular or axillary adenopathy.      Lower Body: No right inguinal adenopathy. No left inguinal adenopathy.   Skin:     General: Skin is warm.      Coloration: Skin is not jaundiced.      Findings: No lesion or rash.      Nails: There is no clubbing.   Neurological:      General: No focal deficit present.      Mental Status: She is alert and oriented to person, place, and time.      Sensory: Sensation is intact.      Motor: Motor function is intact.      Gait: Gait is intact.   Psychiatric:          Attention and Perception: Attention normal.         Mood and Affect: Mood and affect normal.         Speech: Speech normal.         Behavior: Behavior is cooperative.         Thought Content: Thought content normal.         Cognition and Memory: Cognition normal.         Judgment: Judgment normal.       ECOG SCORE    0 - Fully active-able to carry on all pre-disease performance without restriction         Laboratory:   Latest Reference Range & Units 05/10/22 10:54   WBC 4.5 - 11.5 x10(3)/mcL 6.0   RBC 4.20 - 5.40 x10(6)/mcL 4.08 (L)   Hemoglobin 12.0 - 16.0 gm/dL 12.6   Hematocrit 37.0 - 47.0 % 38.2   MCV 80.0 - 94.0 fL 93.6   MCH 27.0 - 31.0 pg 30.9   MCHC 33.0 - 36.0 mg/dL 33.0   RDW 11.5 - 17.0 % 12.5   Platelets 130 - 400 x10(3)/mcL 255   (L): Data is abnormally low  Specimen (24h ago, onward)            None        Microbiology Results (last 7 days)     ** No results found for the last 168 hours. **                     Assessment:       1. Use of letrozole (Femara)    2. Malignant neoplasm of upper-inner quadrant of right breast in female, estrogen receptor positive    3. Ductal carcinoma in situ (DCIS) of left breast        1) Left breast cancer-diagnosed 2005   --lumpectomy/SLNB, XRT, and 5 years of Tamoxifen   2) Mucinous cystosarcoma-Dx 2017    --SP BSO   3) Bilateral breast cancer   --DCIS-clinical anatomic stage IA / prognostic stage IA (cT1b cN0 M0) left breast Dx 3/16/2022   --Clinical anatomic stage IA / prognostic stage IA (cT1b cN0 M0) Right breast    --%, %, Her 2 neg, Ki 67 58%, Grade 1   --Bilateral mastectomies 4/25/2022, Right SLND      Plan:       I discussed with them that even though I believe that she will only need endocrine therapy I would like to send her tissue for Oncotype due to her high Ki 67. She understand that if the score is 21 or greater she will benefit of adjuvant chemotherapy if score less than 21, no chemotherapy indicated.   Overall tumor size of about 0.9 cm.   Invasive component on the biopsy was 5 mm and tumor size 4 mm from mastectomy.    Will send prescription for Femara 2.5 mg daily to pharmacy  Return to clinic in 3 months with NP  Labs: CBC, CMP, CA 27-29  Will order DEXA scan as a baseline    Encouraged to call with questions or problems  The patient was given ample opportunity to ask questions and they were all answered to satisfaction; patient demonstrated understanding of what we discussed and is agreeable to the plan.      GRAHAM HALL MD      Professional Services   I, Radha Diamond LPN, acted solely as a scribe for and in the presence of Dr. Graham Hall, who performed these services.

## 2022-08-10 ENCOUNTER — OFFICE VISIT (OUTPATIENT)
Dept: SURGERY | Facility: CLINIC | Age: 67
End: 2022-08-10
Payer: MEDICARE

## 2022-08-10 VITALS
SYSTOLIC BLOOD PRESSURE: 145 MMHG | OXYGEN SATURATION: 95 % | RESPIRATION RATE: 18 BRPM | WEIGHT: 218.19 LBS | HEART RATE: 68 BPM | TEMPERATURE: 98 F | HEIGHT: 64 IN | BODY MASS INDEX: 37.25 KG/M2 | DIASTOLIC BLOOD PRESSURE: 74 MMHG

## 2022-08-10 DIAGNOSIS — C50.111 MALIGNANT NEOPLASM OF CENTRAL PORTION OF RIGHT BREAST IN FEMALE, ESTROGEN RECEPTOR POSITIVE: Primary | ICD-10-CM

## 2022-08-10 DIAGNOSIS — D05.12 DUCTAL CARCINOMA IN SITU (DCIS) OF LEFT BREAST: ICD-10-CM

## 2022-08-10 DIAGNOSIS — Z85.3 PERSONAL HISTORY OF BREAST CANCER: ICD-10-CM

## 2022-08-10 DIAGNOSIS — Z90.13 HISTORY OF BILATERAL MASTECTOMY: ICD-10-CM

## 2022-08-10 DIAGNOSIS — Z85.43 PERSONAL HISTORY OF OVARIAN CANCER: ICD-10-CM

## 2022-08-10 DIAGNOSIS — Z17.0 MALIGNANT NEOPLASM OF CENTRAL PORTION OF RIGHT BREAST IN FEMALE, ESTROGEN RECEPTOR POSITIVE: Primary | ICD-10-CM

## 2022-08-10 PROCEDURE — 99999 PR PBB SHADOW E&M-EST. PATIENT-LVL IV: CPT | Mod: PBBFAC,,, | Performed by: PHYSICIAN ASSISTANT

## 2022-08-10 PROCEDURE — 1160F PR REVIEW ALL MEDS BY PRESCRIBER/CLIN PHARMACIST DOCUMENTED: ICD-10-PCS | Mod: CPTII,S$GLB,, | Performed by: PHYSICIAN ASSISTANT

## 2022-08-10 PROCEDURE — 3077F SYST BP >= 140 MM HG: CPT | Mod: CPTII,S$GLB,, | Performed by: PHYSICIAN ASSISTANT

## 2022-08-10 PROCEDURE — 3077F PR MOST RECENT SYSTOLIC BLOOD PRESSURE >= 140 MM HG: ICD-10-PCS | Mod: CPTII,S$GLB,, | Performed by: PHYSICIAN ASSISTANT

## 2022-08-10 PROCEDURE — 1126F AMNT PAIN NOTED NONE PRSNT: CPT | Mod: CPTII,S$GLB,, | Performed by: PHYSICIAN ASSISTANT

## 2022-08-10 PROCEDURE — 99214 PR OFFICE/OUTPT VISIT, EST, LEVL IV, 30-39 MIN: ICD-10-PCS | Mod: S$GLB,,, | Performed by: PHYSICIAN ASSISTANT

## 2022-08-10 PROCEDURE — 99999 PR PBB SHADOW E&M-EST. PATIENT-LVL IV: ICD-10-PCS | Mod: PBBFAC,,, | Performed by: PHYSICIAN ASSISTANT

## 2022-08-10 PROCEDURE — 3078F DIAST BP <80 MM HG: CPT | Mod: CPTII,S$GLB,, | Performed by: PHYSICIAN ASSISTANT

## 2022-08-10 PROCEDURE — 3008F PR BODY MASS INDEX (BMI) DOCUMENTED: ICD-10-PCS | Mod: CPTII,S$GLB,, | Performed by: PHYSICIAN ASSISTANT

## 2022-08-10 PROCEDURE — 1126F PR PAIN SEVERITY QUANTIFIED, NO PAIN PRESENT: ICD-10-PCS | Mod: CPTII,S$GLB,, | Performed by: PHYSICIAN ASSISTANT

## 2022-08-10 PROCEDURE — 3008F BODY MASS INDEX DOCD: CPT | Mod: CPTII,S$GLB,, | Performed by: PHYSICIAN ASSISTANT

## 2022-08-10 PROCEDURE — 3078F PR MOST RECENT DIASTOLIC BLOOD PRESSURE < 80 MM HG: ICD-10-PCS | Mod: CPTII,S$GLB,, | Performed by: PHYSICIAN ASSISTANT

## 2022-08-10 PROCEDURE — 1160F RVW MEDS BY RX/DR IN RCRD: CPT | Mod: CPTII,S$GLB,, | Performed by: PHYSICIAN ASSISTANT

## 2022-08-10 PROCEDURE — 99214 OFFICE O/P EST MOD 30 MIN: CPT | Mod: S$GLB,,, | Performed by: PHYSICIAN ASSISTANT

## 2022-08-10 PROCEDURE — 1159F PR MEDICATION LIST DOCUMENTED IN MEDICAL RECORD: ICD-10-PCS | Mod: CPTII,S$GLB,, | Performed by: PHYSICIAN ASSISTANT

## 2022-08-10 PROCEDURE — 1159F MED LIST DOCD IN RCRD: CPT | Mod: CPTII,S$GLB,, | Performed by: PHYSICIAN ASSISTANT

## 2022-08-10 NOTE — PROGRESS NOTES
[facility] Breast Surg  Breast Surgical Oncology  Follow-Up Patient Office Visit       Referring Provider: No ref. provider found   PCP: Chely Cameron MD   Care Team:   Medical Oncologist: No care team member to display   Radiation Oncologist: No care team member to display   OBGYN: No data on file.     Chief Complaint:   Chief Complaint   Patient presents with    Follow-up     Follow up visit        Subjective:   Treatment History:  Left Breast Cancer Dx in 2005:  1. Left Lumpectomy/SLNB  2. XRT  3. 5 years of Tamoxifen      Bilateral Breast Cancer Diagnosed in 2022  1. Bilateral Simple Mastectomy and Right [place] Lymph Node Biopsy 4/25/2022  2. Oncotype Dx RR 18, no overall benefit from chemotherapy  3. Endocrine therapy started 6/2022, expected for 5 years  4. [facility] Genetic Testing 5/17/2022 - Negative     Interval History:   08/10/2022 - Riana Pastrana returns today for 3 month post op visit. She is doing well and has no concerns. She reports good ROM in the bilateral UE and denies symptoms of swelling in the arms or hands. She has no breast concerns. She has started endocrine therapy and had genetic testing which was negative.     HPI:  Riana Pastrana initially presents on 03/29/22 at age 66 Years with a past history of left breast cancer diagnosed in 2005 SP lumpectomy/SLNB, XRT, and 5 years of Tamoxifen as well as recent mucinous cystosarcoma SP BSO diagnosed in 2017 who now presents with bilateral breast cancer. (1) AJCC 8th ed clinical anatomic stage IA / prognostic stage IA (cT1b cN0 M0) Right breast 3 o'clock subareolar invasive ductal carcinoma, grade 1, %,  %, HER2 0 - negative on IHC and Ki67 58%. (2) AJCC 8th ed clinical anatomic stage 0 / prognostic stage 0 (cTis cN0 M0) Left breast central posterior depth ductal carcinoma in situ, grade 1, % and %.     She is s/p bilateral simple mastectomy and right sentinel lymph node  biopsy. Surgical pathology of the right breast revealed invasive ductal carcinoma grade 1 measuring 4 mm. Margins negative. Four right sentinel lymph nodes are negative for malignancy (0/4). Surgical pathology of the left breast revealed central region/6:00 biopsy site with diminutive focus of residual low grade DCIS. Margins also negative.     A detailed patient history was obtained and reviewed.        Imagin. 2022 BL SC MG at [facility]-which revealed on R MG lower inner quadrant anterior depth, there are two focal asymmetries. On L MG central region posterior depth there are indeterminate calcifications. At 12 o'clock left breast middle depth, there is a focal asymmetry with calcifications. There are stable post-therapy changes of the left breast. No detrimental change at the lumpectomy bed. There are stable post-core needle biopsy changes and a jesús shaped clip in the right breast. No detrimental change at this biopsy site. No other significant masses, calcifications, or other findings are seen in either breast. BIRADS-0 ; additional imaging needed.    2. 2022 BL DG MG/ BL US BREAST LIMITED at [facility]- which revealed on R MG at 4 o'clock subareolar anterior there is a 0.7 cm oval, circumscribed mass. On L MG 12 o'clock, anterior depth, there is a 1.2 cm focal asymmetry with associated heterogeneous calcifications. These findings correspond to the areas recalled on the screening examination dated 2022. No other suspicious masses, calcifications, or other signs of malignancy are identified. On R US, at 3 o'clock subareolar there is a 0.6 x 0.4 x 0.4 cm oval, hypoechoic mass with indistinct margins. At 4 o'clock subareolar right breast, there is a 0.7 x 0.3 x 0.6 cm benign, simple cyst. These correspond to the mammographic findings in these regions. No other suspicious sonographic abnormality is seen. Specifically, no suspicious sonographic correlate is seen for the focal asymmetry in the 12 o'clock  left breast. Benign-appearing lymph nodes are noted in the bilateral axillae. BIRADS-4 suspicious; biopsy recommended.     Pathology:  1. 3/16/2022 Ultrasound-guided Core Needle Biopsy Right Breast 3 o'clock Subareolar Mass - Invasive ductal carcinoma, grade 1  %  %  HER2 Negative  Ki67 - 58% High    2.3/16/2022 Stereotactic guided Core Needle Biopsy Left Breast Posterior Depth Amorphous Grouped Calcifications Central Region - Ductal carcinoma in situ, grade 1  %  OR 99%    3. 3/16/2022 Stereotactic guided Core Needle Biopsy Left Breast 12 o'clock Anterior Depth Focal Asymmetry with Heterogenous Calcifications - Sclerotic fibroadenomatoid nodule with dystropic calcification     4. 4/25/2022 Bilateral Simple Mastectomy and Right SLNB - Surgical pathology of the right breast revealed invasive ductal carcinoma grade 1 measuring 4 mm. Margins negative. Four right sentinel lymph nodes are negative for malignancy (0/4). Surgical pathology of the left breast revealed central region/6:00 biopsy site with diminutive focus of residual low grade DCIS. Margins also negative.      OB/GYN History:  Menarche Onset: 13  Menopause: Post, at age:  Hormonal birth control (duration): yes  Pregnancies: 3  Age at first pregnancy: ?  Child births: 2  Hysterectomy: yes, partial hysterectomy at age 29  Oophorectomy: yes, 2017 after diagnosis of adnexal mass suspicious - final pathology after BSO was mucinous cystosarcoma  HRT: no     Other:  # of breast biopsies (when and pathology results): 1 Left breast cancer in 2005  MG breast density: Category B (Scattered fibroglandular)  Prior thoracic RT: none  Genetic testing: none  Ashkenazi Muslim descent: No    Family History:  Family History   Problem Relation Age of Onset    Ovarian cancer Sister 50        Patient History:  Past Medical History:   Diagnosis Date    History of bilateral breast cancer     left in 2007; bilateral in 2022    Hypertension        Past Surgical  History:   Procedure Laterality Date    BREAST BIOPSY      BREAST SURGERY      HYSTERECTOMY      TONSILLECTOMY         Social History     Socioeconomic History    Marital status:    Tobacco Use    Smoking status: Never Smoker    Smokeless tobacco: Never Used   Substance and Sexual Activity    Alcohol use: Never    Drug use: Never       Immunization History   Administered Date(s) Administered    COVID-19, MRNA, LN-S, PF (Pfizer) (Purple Cap) 02/25/2021, 03/19/2021, 11/01/2021    Influenza - Quadrivalent - High Dose - PF (65 years and older) 11/01/2021    Influenza - Quadrivalent - PF (6-35 months) 10/01/2018    Influenza - Quadrivalent - PF *Preferred* (6 months and older) 11/07/2019    Pneumococcal Conjugate - 13 Valent 11/12/2021       Medications/Allergies:  Current Outpatient Medications on File Prior to Visit   Medication Sig Dispense Refill    atorvastatin (LIPITOR) 20 MG tablet Take 1 tablet (20 mg total) by mouth once daily. 90 tablet 2    cholecalciferol, vitamin D3, 100 mcg (4,000 unit) Cap capsule Take 400 Units by mouth once daily. 2 po daily      furosemide (LASIX) 20 MG tablet Take 20 mg by mouth once daily at 6am.      hydroCHLOROthiazide (HYDRODIURIL) 25 MG tablet Take 1 tablet (25 mg total) by mouth once daily. 90 tablet 1    letrozole (FEMARA) 2.5 mg Tab Take 1 tablet (2.5 mg total) by mouth once daily. 30 tablet 2    levothyroxine (SYNTHROID) 50 MCG tablet Take 1 tablet (50 mcg total) by mouth before breakfast. 90 tablet 1    metoprolol succinate (TOPROL-XL) 25 MG 24 hr tablet Take 25 mg by mouth.      metoprolol tartrate (LOPRESSOR) 25 MG tablet 2 (two) times daily.      multivit-min/iron/folic/lutein (CENTRUM SILVER WOMEN ORAL) Take 1 tablet by mouth once daily.      potassium chloride SA (K-DUR,KLOR-CON) 20 MEQ tablet Take 1 tablet (20 mEq total) by mouth once daily. 90 tablet 1     No current facility-administered medications on file prior to visit.       Review  of patient's allergies indicates:   Allergen Reactions    Penicillins Other (See Comments)     UNKNOWN REACTION. ALLERGY SINCE CHILDHOOD         Review of Systems:  Pertinent items are noted in HPI.     Objective:     Vitals:  Vitals:    08/10/22 0835   BP: (!) 145/74   Pulse: 68   Resp: 18   Temp: 97.9 °F (36.6 °C)       Body mass index is 37.45 kg/m².     Physical Exam:  General: The patient is awake, alert and oriented times three. The patient is well nourished and in no acute distress.  Neck: There is no evidence of palpable cervical, supraclavicular or axillary adenopathy. The neck is supple. The thyroid is not enlarged.  Musculoskeletal: The patient has a normal range of motion of her bilateral upper extremities.  Chest: Examination of the chest wall fails to reveal any obvious abnormalities. Nonlabored breathing, symmetric expansion.  Breast: Examination of the Mastectomy sites bilaterally fails to reveal any dominant masses or areas of significant focal nodularity. The nipples are surgically absent bilaterally. There are no significant skin changes overlying the breasts. There is no skin dimpling with movement of the pectoralis.  Abdomen: The abdomen is soft, flat, nontender and nondistended.  Integumentary: no rashes or skin lesions present  Neurologic: cranial nerves intact, no signs of peripheral neurological deficit, motor/sensory function intact      Assessment and Plan:       Riana was seen today for follow-up.    Diagnoses and all orders for this visit:    Malignant neoplasm of central portion of right breast in female, estrogen receptor positive    Ductal carcinoma in situ (DCIS) of left breast    History of bilateral mastectomy    Personal history of breast cancer    Personal history of ovarian cancer          Plan:       Healthy lifestyle guidelines were reviewed. She was encouraged to engage in regular exercise, maintain a healthy body weight, and avoid excessive alcohol consumption. Healthy  nutritional guidelines were also discussed. Self-breast examination was reviewed with the patient in detail and she was encouraged to perform this on a monthly basis.    Continue endocrine therapy and follow up with medical oncology.    RTC in 6 months for CBE.      All of her questions were answered. She was advised to call if she develops any questions or concerns.    Sirena Lee PA-C              Total time on the date of the visit ranged from 30-39 mins (14770). Total time includes both face-to-face and non-face-to-face time personally spent by myself on the day of the visit.    Non-face-to-face time included:  _X_ preparing to see the patient such as reviewing the patient record  __ obtaining and reviewing separately obtained history  _X_ independently interpreting results  _X_ documenting clinical information in electronic health record.    Face-to-face time included:  _X_ performing an appropriate history and examination  _X_ communicating results to the patient  _X_ counseling and educating the patient  __ ordering appropriate medications  __ ordering appropriate tests  _X_ ordering appropriate procedures (including follow-up)  _X_ answering any questions the patient had    Total Time spent on date of visit: 35 minutes

## 2022-09-14 DIAGNOSIS — C50.211 MALIGNANT NEOPLASM OF UPPER-INNER QUADRANT OF RIGHT BREAST IN FEMALE, ESTROGEN RECEPTOR POSITIVE: ICD-10-CM

## 2022-09-14 DIAGNOSIS — D05.12 DUCTAL CARCINOMA IN SITU (DCIS) OF LEFT BREAST: ICD-10-CM

## 2022-09-14 DIAGNOSIS — Z17.0 MALIGNANT NEOPLASM OF UPPER-INNER QUADRANT OF RIGHT BREAST IN FEMALE, ESTROGEN RECEPTOR POSITIVE: ICD-10-CM

## 2022-09-14 DIAGNOSIS — D05.12 INTRADUCTAL CARCINOMA IN SITU OF LEFT BREAST: Primary | ICD-10-CM

## 2022-09-21 ENCOUNTER — HOSPITAL ENCOUNTER (OUTPATIENT)
Dept: RADIOLOGY | Facility: HOSPITAL | Age: 67
Discharge: HOME | End: 2022-09-21
Attending: INTERNAL MEDICINE
Payer: MEDICARE

## 2022-09-21 DIAGNOSIS — C50.211 MALIGNANT NEOPLASM OF UPPER-INNER QUADRANT OF RIGHT BREAST IN FEMALE, ESTROGEN RECEPTOR POSITIVE: ICD-10-CM

## 2022-09-21 DIAGNOSIS — D05.12 DUCTAL CARCINOMA IN SITU (DCIS) OF LEFT BREAST: ICD-10-CM

## 2022-09-21 DIAGNOSIS — Z17.0 MALIGNANT NEOPLASM OF UPPER-INNER QUADRANT OF RIGHT BREAST IN FEMALE, ESTROGEN RECEPTOR POSITIVE: ICD-10-CM

## 2022-09-21 DIAGNOSIS — Z79.811 USE OF LETROZOLE (FEMARA): ICD-10-CM

## 2022-09-21 PROCEDURE — 77080 DXA BONE DENSITY AXIAL: CPT | Mod: TC

## 2022-09-21 PROCEDURE — 77080 DEXA BONE DENSITY SPINE HIP: ICD-10-PCS | Mod: 26,,, | Performed by: RADIOLOGY

## 2022-09-21 PROCEDURE — 77080 DXA BONE DENSITY AXIAL: CPT | Mod: 26,,, | Performed by: RADIOLOGY

## 2022-09-28 ENCOUNTER — OFFICE VISIT (OUTPATIENT)
Dept: HEMATOLOGY/ONCOLOGY | Facility: CLINIC | Age: 67
End: 2022-09-28
Payer: MEDICARE

## 2022-09-28 VITALS
SYSTOLIC BLOOD PRESSURE: 155 MMHG | HEART RATE: 66 BPM | WEIGHT: 218.81 LBS | DIASTOLIC BLOOD PRESSURE: 85 MMHG | RESPIRATION RATE: 18 BRPM | OXYGEN SATURATION: 97 % | BODY MASS INDEX: 37.36 KG/M2 | HEIGHT: 64 IN | TEMPERATURE: 98 F

## 2022-09-28 DIAGNOSIS — D05.12 DUCTAL CARCINOMA IN SITU (DCIS) OF LEFT BREAST: ICD-10-CM

## 2022-09-28 DIAGNOSIS — Z90.13 HISTORY OF BILATERAL MASTECTOMY: ICD-10-CM

## 2022-09-28 DIAGNOSIS — M81.0 OSTEOPOROSIS, UNSPECIFIED OSTEOPOROSIS TYPE, UNSPECIFIED PATHOLOGICAL FRACTURE PRESENCE: ICD-10-CM

## 2022-09-28 DIAGNOSIS — Z79.811 USE OF LETROZOLE (FEMARA): ICD-10-CM

## 2022-09-28 DIAGNOSIS — C50.211 MALIGNANT NEOPLASM OF UPPER-INNER QUADRANT OF RIGHT BREAST IN FEMALE, ESTROGEN RECEPTOR POSITIVE: Primary | ICD-10-CM

## 2022-09-28 DIAGNOSIS — Z17.0 MALIGNANT NEOPLASM OF UPPER-INNER QUADRANT OF RIGHT BREAST IN FEMALE, ESTROGEN RECEPTOR POSITIVE: Primary | ICD-10-CM

## 2022-09-28 DIAGNOSIS — Z85.3 HISTORY OF LEFT BREAST CANCER: ICD-10-CM

## 2022-09-28 PROCEDURE — 1160F RVW MEDS BY RX/DR IN RCRD: CPT | Mod: CPTII,S$GLB,, | Performed by: INTERNAL MEDICINE

## 2022-09-28 PROCEDURE — 1159F PR MEDICATION LIST DOCUMENTED IN MEDICAL RECORD: ICD-10-PCS | Mod: CPTII,S$GLB,, | Performed by: INTERNAL MEDICINE

## 2022-09-28 PROCEDURE — 1159F MED LIST DOCD IN RCRD: CPT | Mod: CPTII,S$GLB,, | Performed by: INTERNAL MEDICINE

## 2022-09-28 PROCEDURE — 3079F PR MOST RECENT DIASTOLIC BLOOD PRESSURE 80-89 MM HG: ICD-10-PCS | Mod: CPTII,S$GLB,, | Performed by: INTERNAL MEDICINE

## 2022-09-28 PROCEDURE — 3008F BODY MASS INDEX DOCD: CPT | Mod: CPTII,S$GLB,, | Performed by: INTERNAL MEDICINE

## 2022-09-28 PROCEDURE — 99999 PR PBB SHADOW E&M-EST. PATIENT-LVL IV: ICD-10-PCS | Mod: PBBFAC,,, | Performed by: INTERNAL MEDICINE

## 2022-09-28 PROCEDURE — 3077F SYST BP >= 140 MM HG: CPT | Mod: CPTII,S$GLB,, | Performed by: INTERNAL MEDICINE

## 2022-09-28 PROCEDURE — 3077F PR MOST RECENT SYSTOLIC BLOOD PRESSURE >= 140 MM HG: ICD-10-PCS | Mod: CPTII,S$GLB,, | Performed by: INTERNAL MEDICINE

## 2022-09-28 PROCEDURE — 1126F AMNT PAIN NOTED NONE PRSNT: CPT | Mod: CPTII,S$GLB,, | Performed by: INTERNAL MEDICINE

## 2022-09-28 PROCEDURE — 3079F DIAST BP 80-89 MM HG: CPT | Mod: CPTII,S$GLB,, | Performed by: INTERNAL MEDICINE

## 2022-09-28 PROCEDURE — 99214 PR OFFICE/OUTPT VISIT, EST, LEVL IV, 30-39 MIN: ICD-10-PCS | Mod: S$GLB,,, | Performed by: INTERNAL MEDICINE

## 2022-09-28 PROCEDURE — 99999 PR PBB SHADOW E&M-EST. PATIENT-LVL IV: CPT | Mod: PBBFAC,,, | Performed by: INTERNAL MEDICINE

## 2022-09-28 PROCEDURE — 1126F PR PAIN SEVERITY QUANTIFIED, NO PAIN PRESENT: ICD-10-PCS | Mod: CPTII,S$GLB,, | Performed by: INTERNAL MEDICINE

## 2022-09-28 PROCEDURE — 3008F PR BODY MASS INDEX (BMI) DOCUMENTED: ICD-10-PCS | Mod: CPTII,S$GLB,, | Performed by: INTERNAL MEDICINE

## 2022-09-28 PROCEDURE — 99214 OFFICE O/P EST MOD 30 MIN: CPT | Mod: S$GLB,,, | Performed by: INTERNAL MEDICINE

## 2022-09-28 PROCEDURE — 1160F PR REVIEW ALL MEDS BY PRESCRIBER/CLIN PHARMACIST DOCUMENTED: ICD-10-PCS | Mod: CPTII,S$GLB,, | Performed by: INTERNAL MEDICINE

## 2022-09-28 NOTE — PROGRESS NOTES
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 2022 BL SC MG at [facility]-which revealed on R MG lower inner quadrant anterior depth, there are two focal asymmetries. On L MG central region posterior depth there are indeterminate calcifications. At 12 o'clock left breast middle depth, there is a focal asymmetry with calcifications. There are stable post-therapy changes of the left breast. No detrimental change at the lumpectomy bed. There are stable post-core needle biopsy changes and a jesús shaped clip in the right breast. No detrimental change at this biopsy site. No other significant masses, calcifications, or other findings are seen in either breast. BIRADS-0 ; additional imaging needed.    2. 2022 BL DG MG/ BL US BREAST LIMITED at [facility]- which revealed on R MG at 4 o'clock subareolar anterior there is a 0.7 cm oval, circumscribed mass. On L MG 12 o'clock, anterior depth, there is a 1.2 cm focal asymmetry with associated heterogeneous calcifications. These findings correspond to the areas recalled on the screening examination dated 2022. No other suspicious masses, calcifications, or other signs of malignancy are  identified. On R US, at 3 o'clock subareolar there is a 0.6 x 0.4 x 0.4 cm oval, hypoechoic mass with indistinct margins. At 4 o'clock subareolar right breast, there is a 0.7 x 0.3 x 0.6 cm benign, simple cyst. These correspond to the mammographic findings in these regions. No other suspicious sonographic abnormality is seen. Specifically, no suspicious sonographic correlate is seen for the focal asymmetry in the 12 o'clock left breast. Benign-appearing lymph nodes are noted in the bilateral axillae. BIRADS-4 suspicious; biopsy recommended.    Bone density 9/21/22:   LUMBAR SPINE The L1 to L3 vertebral bone mineral density is equal to 1.121 g/cm squared with a T score of -0.5.  LEFT HIP The left femoral neck bone mineral density is equal to 0.744 g/cm squared with a T score of -2.1.   The left total hip bone mineral density is equal to 0.816 g/cm squared with a T score of -1.5.   RIGHT HIP The right femoral neck bone mineral density is equal to 0.694 g/cm squared with a T score of -2.5.  The right total hip bone mineral density is equal to 0.824 g/cm squared with a T score of -1.5.  FRAX 10-YEAR PROBABILITY OF FRACTURE: 21.4% risk of a major osteoporotic fracture and 2.9% risk of hip fracture in the next 10 years    Impression: Osteoporosis      Pathology:  3/16/2022:   [1] LEFT BREAST POSTERIOR DEPTH AMORPHOUS GROUPED CALCIFICATIONS CENTRAL REGION: DUCTAL CARCINOMA IN SITU, LOW GRADE CRIBRIFORM/MICROPAPILLARY TYPE, WITH MICROCALCIFICATIONS. DCIS is present on two core biopsies, the largest focus measuring less than 3 mm.   [2] LEFT BREAST 12 O' CLOCK ANTERIOR DEPTH FOCAL ASYMMETRY WITH HETEROGENOUS CALCIFICATIONS:SCLEROTIC FIBROADENOMATOID NODULE WITH DYSTROPIC CALCIFICATION.   [3] RIGHT BREAST 3 O' CLOCK SUBAREOLAR MASS: INVASIVE DUCTAL CARCINOMA, SOTO-ROMERO GRADE 1.  Carcinoma is present on two core biopsies and measures 5 mm.    %, %, Her2 neg, Ki67 58%.    4/25/2022:  [1]  BREAST, LEFT, SIMPLE  MASTECTOMY: DIMINUTIVE FOCUS OF RESIDUAL LOW GRADE DUCTAL CARCINOMA IN SITU.  [2]  BREAST, RIGHT, SIMPLE MASTECTOMY: INVASIVE DUCTAL CARCINOMA, LOW GRADE, GRADE 1(OF 3).  -Tumor size:  4.0 mm. pT Category:  pT1a  pN0  BREAST, RIGHT, AXILLARY SENTINEL NODE #4/4: NEGATIVE FOR METASTATIC CARCINOMA.  The patient's previous history of low grade invasive ductal carcinoma is noted from surgical pathology report [id number] ER (100%), MI (100%), HER2 negative, Ki-67 high (58%).              CLINICAL HISTORY:       Patient: Riana Pastrana is a 67 y.o. female kindly referred for history of breast cancer.  I do not have record of her diagnosis and treatment of her original breast cancer.    Patient states that she was diagnosed with breast cancer in 2005.  She had Left lumpectomy done, with no chemotherapy or radiation. She took tamoxifen for 5 years. She reports that she was treated by Dr. Robbin Kenny. She says that she was seen at [facility], but since her insurance has changed she would like to establish care here.     Patient reports menarche at 13. She had 3 pregnancies, 2 live children, 1 miscarriage. Her first child was at age 20.She had a partial hysterectomy at age 29. She admits to oral contraceptives for about 3 years. No hormone replacement therapy.     Her sister had ovarian cancer diagnosed in her 50's, currently still alive at age 80.    She reports that she had a mass in her stomach and it was removed in 2017 along with her ovaries at [facility].  Again I do not have the records, but imaging studies none here include CT scan of the abdomen and pelvis done on 5/21/2017 for an abdominal distention showed a 22 x 21 x 16 cm complex septated peripherally enhancing mass which appears to arise from the right adnexa, presumably ovarian in origin.  Uterus not identified.  Cystic mass in the abdomen and pelvis displaces the urinary bladder inferiorly.  Liver is is heterogeneous in attenuation and contains  2 low-attenuation masses in the dome, too small to accurately characterize by CT.  Spleen normal in size. CT of the chest showed cardiomegaly with moderate pericardial effusion.  Bilateral lower lobe consolidation with atelectasis and bilateral pleural effusions, left greater than right. Large abdominal cystic mass of unknown etiology. Further assessment with dedicated imaging of the abdomen and pelvis recommended. US pelvis 5/22/2021:  A large, multilocular mass consistent with the CT findings is identified measuring 27.5 x 20.1 x 19.6 cm. There are multiple septations or separate fluid loculations in the large cystic mass. Low level internal echogenicity is also visible in the cyst fluid with dependent debris also identified. No free pelvic fluid is visible. This lesion is suspicious for neoplasm of pelvic and likely ovarian origin. The uterus is not identified and surgically absent by patient history. 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  Ascites  suggestive of peritoneal implants,  the ascites is new from the previous study. No Path available    On 2/7/2022 screening mammogram: INCOMPLETE: NEEDS ADDITIONAL IMAGING EVALUATION  Right breast focal asymmetries, left breast calcifications, and left breast focal asymmetry with calcifications need further evaluation. BI-RADS 0: Incomplete. Need additional imaging evaluation.     On 2/24/2022 Diagnostic right mammogram and Breast US: SUSPICIOUS OF MALIGNANCY  1. Oval, hypoechoic mass with indistinct margins in the 3:00 subareolar right breast is suspicious. Right breast ultrasound-guided biopsy is recommended.  2. Amorphous, grouped calcifications in the central left breast, posterior depth, are suspicious. Left breast stereotactic guided biopsy is recommended.  3. Focal asymmetry with associated heterogeneous calcifications in the 12:00 left breast, anterior depth, is suspicious. Left breast stereotactic guided  biopsy is recommended.     On 3/16/2022 she is schedule for breast bx. otherwise she is doing well and voices no concerns.  No fever, chills, sweats.  No chest pain or shortness of breath.  No changes in bowel habits.  No abdominal or pelvic pain.  No neurological symptoms.    [1] LEFT BREAST POSTERIOR DEPTH AMORPHOUS GROUPED CALCIFICATIONS CENTRAL REGION:  DUCTAL CARCINOMA IN SITU, LOW GRADE CRIBRIFORM/MICROPAPILLARY TYPE, WITH MICROCALCIFICATIONS.  COMMENT: DCIS is present on two core biopsies, the largest focus measuring less than 3 mm. The results of ER/TN analysis will be the subject of an addendum report.  [2] LEFT BREAST 12 O' CLOCK ANTERIOR DEPTH FOCAL ASYMMETRY WITH HETEROGENOUS CALCIFICATIONS:  SCLEROTIC FIBROADENOMATOID NODULE WITH DYSTROPIC CALCIFICATION.  [3] RIGHT BREAST 3 O' CLOCK SUBAREOLAR MASS:  INVASIVE DUCTAL CARCINOMA, SOTO-ROMERO GRADE 1.  COMMENT: Carcinoma is present on two core biopsies and measures 5 mm.    %, %, Her2 neg, Ki67 58%.    Chief Complaint: no complaints today      Interval History:    She underwent bilateral mastectomies 4/25/2022. Overall,  she has been doing well. She denies any fever, chills, sweats. No chest pain or shortness of breath.  No changes in bowel habits.  Oncotype showed a recurrence score of 18 with a distant recurrent risk at 9 years of 5% with AI or tamoxifen alone and <1% absolute chemotherapy benefit.  She was started on aromatase inhibitors in March 2022      Past Medical History:   Diagnosis Date    History of bilateral breast cancer     left in 2007; bilateral in 2022    Hypertension       Past Surgical History:   Procedure Laterality Date    BREAST BIOPSY      BREAST SURGERY      HYSTERECTOMY      TONSILLECTOMY       Family History   Problem Relation Age of Onset    Ovarian cancer Sister 50     Social Connections: Not on file       Review of patient's allergies indicates:   Allergen Reactions    Penicillins Other (See Comments)      UNKNOWN REACTION. ALLERGY SINCE CHILDHOOD        Current Outpatient Medications on File Prior to Visit   Medication Sig Dispense Refill    atorvastatin (LIPITOR) 20 MG tablet Take 1 tablet (20 mg total) by mouth once daily. 90 tablet 2    cholecalciferol, vitamin D3, 100 mcg (4,000 unit) Cap capsule Take 400 Units by mouth once daily. 2 po daily      furosemide (LASIX) 20 MG tablet Take 20 mg by mouth once daily at 6am.      hydroCHLOROthiazide (HYDRODIURIL) 25 MG tablet Take 1 tablet (25 mg total) by mouth once daily. 90 tablet 1    letrozole (FEMARA) 2.5 mg Tab Take 1 tablet by mouth once daily 30 tablet 0    levothyroxine (SYNTHROID) 50 MCG tablet TAKE 1 TABLET BY MOUTH BEFORE BREAKFAST. 90 tablet 1    metoprolol succinate (TOPROL-XL) 25 MG 24 hr tablet Take 25 mg by mouth.      metoprolol tartrate (LOPRESSOR) 25 MG tablet 2 (two) times daily.      multivit-min/iron/folic/lutein (CENTRUM SILVER WOMEN ORAL) Take 1 tablet by mouth once daily.      potassium chloride SA (K-DUR,KLOR-CON) 20 MEQ tablet Take 1 tablet (20 mEq total) by mouth once daily. 90 tablet 1     No current facility-administered medications on file prior to visit.      Review of Systems   Constitutional:  Negative for activity change, appetite change, chills, diaphoresis, fatigue, fever and unexpected weight change.   HENT:  Negative for mouth dryness, mouth sores, nosebleeds, postnasal drip, sinus pressure/congestion, sore throat and trouble swallowing.    Eyes:  Negative for visual disturbance.   Respiratory:  Negative for apnea, cough, choking, chest tightness and shortness of breath.    Cardiovascular:  Negative for chest pain, palpitations and leg swelling.   Gastrointestinal:  Negative for abdominal distention, abdominal pain, blood in stool, change in bowel habit, constipation, diarrhea, nausea, vomiting and change in bowel habit.   Endocrine: Negative.    Genitourinary:  Negative for difficulty urinating, dysuria, frequency, hematuria and  "urgency.   Musculoskeletal:  Negative for arthralgias, back pain, myalgias and neck pain.   Integumentary:  Negative for rash, breast mass, breast discharge and breast tenderness.   Neurological:  Negative for dizziness, tremors, syncope, speech difficulty, weakness, light-headedness, numbness, headaches and memory loss.   Hematological:  Does not bruise/bleed easily.   Psychiatric/Behavioral:  Negative for confusion, hallucinations, sleep disturbance and suicidal ideas.    Breast: Negative for mass and tenderness           Vitals:    09/28/22 1251   BP: (!) 155/85   BP Location: Right arm   Patient Position: Sitting   BP Method: Medium (Automatic)   Pulse: 66   Resp: 18   Temp: 98.1 °F (36.7 °C)   TempSrc: Oral   SpO2: 97%   Weight: 99.2 kg (218 lb 12.8 oz)   Height: 5' 4" (1.626 m)      Physical Exam  Vitals and nursing note reviewed.   Constitutional:       General: She is not in acute distress.     Appearance: She is well-developed. She is obese.   HENT:      Head: Normocephalic and atraumatic.      Mouth/Throat:      Mouth: Mucous membranes are moist.   Eyes:      General: No scleral icterus.     Extraocular Movements: Extraocular movements intact.      Conjunctiva/sclera: Conjunctivae normal.      Pupils: Pupils are equal, round, and reactive to light.   Neck:      Vascular: No JVD.   Cardiovascular:      Rate and Rhythm: Normal rate and regular rhythm.      Heart sounds: No murmur heard.  Pulmonary:      Effort: Pulmonary effort is normal.      Breath sounds: Normal breath sounds. No wheezing or rhonchi.   Chest:      Chest wall: No deformity or tenderness.   Breasts:     Right: Absent. No swelling, mass, skin change or tenderness.      Left: Absent. No swelling, mass, skin change or tenderness.   Abdominal:      General: Bowel sounds are normal. There is no distension.      Palpations: Abdomen is soft. There is no mass.      Tenderness: There is no abdominal tenderness.   Musculoskeletal:         General: No " swelling or deformity.      Cervical back: Neck supple.   Lymphadenopathy:      Cervical: No cervical adenopathy.      Upper Body:      Right upper body: No supraclavicular or axillary adenopathy.      Left upper body: No supraclavicular or axillary adenopathy.      Lower Body: No right inguinal adenopathy. No left inguinal adenopathy.   Skin:     General: Skin is warm.      Coloration: Skin is not jaundiced.      Findings: No lesion or rash.      Nails: There is no clubbing.   Neurological:      General: No focal deficit present.      Mental Status: She is alert and oriented to person, place, and time.      Sensory: Sensation is intact.      Motor: Motor function is intact.      Gait: Gait is intact.   Psychiatric:         Attention and Perception: Attention normal.         Mood and Affect: Mood and affect normal.         Speech: Speech normal.         Behavior: Behavior is cooperative.         Thought Content: Thought content normal.         Cognition and Memory: Cognition normal.         Judgment: Judgment normal.     ECOG SCORE    0 - Fully active-able to carry on all pre-disease performance without restriction         Laboratory:   Latest Reference Range & Units 05/10/22 10:54   WBC 4.5 - 11.5 x10(3)/mcL 6.0   RBC 4.20 - 5.40 x10(6)/mcL 4.08 (L)   Hemoglobin 12.0 - 16.0 gm/dL 12.6   Hematocrit 37.0 - 47.0 % 38.2   MCV 80.0 - 94.0 fL 93.6   MCH 27.0 - 31.0 pg 30.9   MCHC 33.0 - 36.0 mg/dL 33.0   RDW 11.5 - 17.0 % 12.5   Platelets 130 - 400 x10(3)/mcL 255   (L): Data is abnormally low         Assessment:       1. Malignant neoplasm of upper-inner quadrant of right breast in female, estrogen receptor positive    2. Ductal carcinoma in situ (DCIS) of left breast    3. History of left breast cancer    4. History of bilateral mastectomy    5. Osteoporosis, unspecified osteoporosis type, unspecified pathological fracture presence    6. Use of letrozole (Femara)        1) Left breast cancer-diagnosed  2005   --lumpectomy/SLNB, XRT, and 5 years of Tamoxifen   2) Mucinous cystosarcoma-Dx 2017    --SP BSO   3) Bilateral breast cancer   --DCIS-clinical anatomic stage IA / prognostic stage IA (cT1b cN0 M0) left breast Dx 3/16/2022   --Clinical anatomic stage IA / prognostic stage IA (cT1b cN0 M0) Right breast    --%, %, Her 2 neg, Ki 67 58%, Grade 1   --Bilateral mastectomies 4/25/2022, Right SLND      Plan:       I discussed with them that even though I believe that she will only need endocrine therapy I would like to send her tissue for Oncotype due to her high Ki 67. She understand that if the score is 21 or greater she will benefit of adjuvant chemotherapy if score less than 21, no chemotherapy indicated.   Overall tumor size of about 0.9 cm.  Invasive component on the biopsy was 5 mm and tumor size 4 mm from mastectomy.    Started Femara 3/2022    Baseline DEXA scan with osteoporosis --right femoral neck bone mineral density with a T score of -2.5. Otherwise osteopenia  She will cont Femara for now and re eval next Bone density. If bone mass worsen then with change to Tamoxifen  Prolia as soon as approve.   Cont Ca and vit D supplements and weight bearing exercise  Return to clinic in 3 months with NP  Labs: CBC, CMP, CA 27-29  Weight loss counseling given  No breast imaging as she has bilateral mastectomies    Encouraged to call with questions or problems  The patient was given ample opportunity to ask questions and they were all answered to satisfaction; patient demonstrated understanding of what we discussed and is agreeable to the plan.      GRAHAM HALL MD

## 2022-10-17 ENCOUNTER — INFUSION (OUTPATIENT)
Dept: INFUSION THERAPY | Facility: HOSPITAL | Age: 67
End: 2022-10-17
Attending: INTERNAL MEDICINE
Payer: MEDICARE

## 2022-10-17 VITALS
OXYGEN SATURATION: 96 % | SYSTOLIC BLOOD PRESSURE: 142 MMHG | RESPIRATION RATE: 16 BRPM | HEART RATE: 60 BPM | TEMPERATURE: 98 F | DIASTOLIC BLOOD PRESSURE: 85 MMHG

## 2022-10-17 DIAGNOSIS — M81.0 OSTEOPOROSIS, UNSPECIFIED OSTEOPOROSIS TYPE, UNSPECIFIED PATHOLOGICAL FRACTURE PRESENCE: Primary | ICD-10-CM

## 2022-10-17 DIAGNOSIS — Z17.0 MALIGNANT NEOPLASM OF UPPER-INNER QUADRANT OF RIGHT BREAST IN FEMALE, ESTROGEN RECEPTOR POSITIVE: ICD-10-CM

## 2022-10-17 DIAGNOSIS — Z79.811 USE OF LETROZOLE (FEMARA): ICD-10-CM

## 2022-10-17 DIAGNOSIS — C50.211 MALIGNANT NEOPLASM OF UPPER-INNER QUADRANT OF RIGHT BREAST IN FEMALE, ESTROGEN RECEPTOR POSITIVE: ICD-10-CM

## 2022-10-17 PROCEDURE — 63600175 PHARM REV CODE 636 W HCPCS: Mod: JG | Performed by: INTERNAL MEDICINE

## 2022-10-17 PROCEDURE — 96372 THER/PROPH/DIAG INJ SC/IM: CPT

## 2022-10-17 RX ADMIN — DENOSUMAB 60 MG: 60 INJECTION SUBCUTANEOUS at 09:10

## 2022-10-17 NOTE — PLAN OF CARE
Pt tolerated first prolia injection well. Pt remained in clinic for 10 min shot time; NAD or pt complaints noted at the end of that time period. Medication information included in AVS. Next appt reviewed with pt. Pt denied questions or further needs at the time of discharge.

## 2022-11-04 ENCOUNTER — TELEPHONE (OUTPATIENT)
Dept: ADMINISTRATIVE | Facility: HOSPITAL | Age: 67
End: 2022-11-04
Payer: MEDICARE

## 2022-11-14 ENCOUNTER — TELEPHONE (OUTPATIENT)
Dept: INTERNAL MEDICINE | Facility: CLINIC | Age: 67
End: 2022-11-14
Payer: MEDICARE

## 2022-11-23 ENCOUNTER — OFFICE VISIT (OUTPATIENT)
Dept: INTERNAL MEDICINE | Facility: CLINIC | Age: 67
End: 2022-11-23
Payer: MEDICARE

## 2022-11-23 VITALS
HEIGHT: 64 IN | OXYGEN SATURATION: 97 % | HEART RATE: 69 BPM | BODY MASS INDEX: 37.22 KG/M2 | WEIGHT: 218 LBS | SYSTOLIC BLOOD PRESSURE: 111 MMHG | TEMPERATURE: 97 F | DIASTOLIC BLOOD PRESSURE: 59 MMHG

## 2022-11-23 DIAGNOSIS — E03.9 HYPOTHYROIDISM, UNSPECIFIED TYPE: Chronic | ICD-10-CM

## 2022-11-23 DIAGNOSIS — M81.0 AGE-RELATED OSTEOPOROSIS WITHOUT CURRENT PATHOLOGICAL FRACTURE: Chronic | ICD-10-CM

## 2022-11-23 DIAGNOSIS — E78.2 MIXED HYPERLIPIDEMIA: Chronic | ICD-10-CM

## 2022-11-23 DIAGNOSIS — I10 PRIMARY HYPERTENSION: Chronic | ICD-10-CM

## 2022-11-23 DIAGNOSIS — E87.6 HYPOKALEMIA: ICD-10-CM

## 2022-11-23 DIAGNOSIS — E66.01 SEVERE OBESITY (BMI 35.0-39.9) WITH COMORBIDITY: Chronic | ICD-10-CM

## 2022-11-23 DIAGNOSIS — Z23 NEED FOR PNEUMOCOCCAL VACCINATION: ICD-10-CM

## 2022-11-23 DIAGNOSIS — Z00.00 MEDICARE ANNUAL WELLNESS VISIT, SUBSEQUENT: Primary | ICD-10-CM

## 2022-11-23 PROBLEM — E78.5 HYPERLIPIDEMIA: Chronic | Status: ACTIVE | Noted: 2022-05-13

## 2022-11-23 PROCEDURE — 1101F PT FALLS ASSESS-DOCD LE1/YR: CPT | Mod: CPTII,,,

## 2022-11-23 PROCEDURE — 3078F PR MOST RECENT DIASTOLIC BLOOD PRESSURE < 80 MM HG: ICD-10-PCS | Mod: CPTII,,,

## 2022-11-23 PROCEDURE — 1159F MED LIST DOCD IN RCRD: CPT | Mod: CPTII,,,

## 2022-11-23 PROCEDURE — G0009 ADMIN PNEUMOCOCCAL VACCINE: HCPCS | Mod: ,,,

## 2022-11-23 PROCEDURE — 3008F PR BODY MASS INDEX (BMI) DOCUMENTED: ICD-10-PCS | Mod: CPTII,,,

## 2022-11-23 PROCEDURE — G0439 PPPS, SUBSEQ VISIT: HCPCS | Mod: ,,,

## 2022-11-23 PROCEDURE — 1160F RVW MEDS BY RX/DR IN RCRD: CPT | Mod: CPTII,,,

## 2022-11-23 PROCEDURE — 3288F FALL RISK ASSESSMENT DOCD: CPT | Mod: CPTII,,,

## 2022-11-23 PROCEDURE — 90732 PPSV23 VACC 2 YRS+ SUBQ/IM: CPT | Mod: ,,,

## 2022-11-23 PROCEDURE — 90732 PNEUMOCOCCAL POLYSACCHARIDE VACCINE 23-VALENT =>2YO SQ IM: ICD-10-PCS | Mod: ,,,

## 2022-11-23 PROCEDURE — 1160F PR REVIEW ALL MEDS BY PRESCRIBER/CLIN PHARMACIST DOCUMENTED: ICD-10-PCS | Mod: CPTII,,,

## 2022-11-23 PROCEDURE — 1101F PR PT FALLS ASSESS DOC 0-1 FALLS W/OUT INJ PAST YR: ICD-10-PCS | Mod: CPTII,,,

## 2022-11-23 PROCEDURE — 3078F DIAST BP <80 MM HG: CPT | Mod: CPTII,,,

## 2022-11-23 PROCEDURE — 3008F BODY MASS INDEX DOCD: CPT | Mod: CPTII,,,

## 2022-11-23 PROCEDURE — 1159F PR MEDICATION LIST DOCUMENTED IN MEDICAL RECORD: ICD-10-PCS | Mod: CPTII,,,

## 2022-11-23 PROCEDURE — G0439 PR MEDICARE ANNUAL WELLNESS SUBSEQUENT VISIT: ICD-10-PCS | Mod: ,,,

## 2022-11-23 PROCEDURE — G0009 PNEUMOCOCCAL POLYSACCHARIDE VACCINE 23-VALENT =>2YO SQ IM: ICD-10-PCS | Mod: ,,,

## 2022-11-23 PROCEDURE — 3074F PR MOST RECENT SYSTOLIC BLOOD PRESSURE < 130 MM HG: ICD-10-PCS | Mod: CPTII,,,

## 2022-11-23 PROCEDURE — 3288F PR FALLS RISK ASSESSMENT DOCUMENTED: ICD-10-PCS | Mod: CPTII,,,

## 2022-11-23 PROCEDURE — 3074F SYST BP LT 130 MM HG: CPT | Mod: CPTII,,,

## 2022-11-23 RX ORDER — POTASSIUM CHLORIDE 750 MG/1
20 CAPSULE, EXTENDED RELEASE ORAL DAILY
Qty: 60 CAPSULE | Refills: 0 | Status: SHIPPED | OUTPATIENT
Start: 2022-11-23 | End: 2022-12-23

## 2022-11-23 NOTE — PROGRESS NOTES
Patient ID: Riana Pastrana is a 67 y.o. female.    Chief Complaint: No chief complaint on file.    Ms. Mayers is a 66-year-old female here today for 6 month follow-up visit.  Medical comorbidities include the PAF, HTN, HLD, and breast cancer.  Also with history of left breast cancer s/p  lumpectomy/SLNB, radiation, and Tamoxifen. Now s/p bilateral mastectomy (04/25/2022) for Malignant neoplasm of central portion of right female breast and Ductal carcinoma in situ left breast. Followed by oncology , did have an abnormal CT scan with a cystic mass in the abdomen and pelvis which was followed by an ultrasound of the pelvis that showed a large multilocular mass consistent with septations or separate fluid loculations likely ovarian in origin; more ports to have upcoming visit with Oncology within next few weeks.  Otherwise doing well postoperatively without any acute complaints.      MCW: 11/12/21   CRS: 11/16/2018  Pneumococcal 23:  Next visit      MEDICAL HISTORY:    Past Medical History:   Diagnosis Date    History of bilateral breast cancer     left in 2007; bilateral in 2022    Hypertension       Past Surgical History:   Procedure Laterality Date    BREAST BIOPSY      BREAST SURGERY      HYSTERECTOMY      TONSILLECTOMY        Social History     Tobacco Use    Smoking status: Never    Smokeless tobacco: Never   Substance Use Topics    Alcohol use: Never    Drug use: Never          Health Maintenance Due   Topic Date Due    Hepatitis C Screening  Never done    TETANUS VACCINE  Never done    Shingles Vaccine (1 of 2) Never done    Pneumococcal Vaccines (Age 65+) (2 - PPSV23 if available, else PCV20) 11/12/2022          Patient Care Team:  Chely Cameron MD as PCP - General (Internal Medicine)      Review of Systems    Objective:   There were no vitals taken for this visit.     Physical Exam      Assessment:   No diagnosis found.     Plan:     Problem List Items Addressed This Visit    None       No  follow-ups on file.   -plan specifics discussed above          Medication List with Changes/Refills   Current Medications    ATORVASTATIN (LIPITOR) 20 MG TABLET    Take 1 tablet (20 mg total) by mouth once daily.    CHOLECALCIFEROL, VITAMIN D3, 100 MCG (4,000 UNIT) CAP CAPSULE    Take 400 Units by mouth once daily. 2 po daily    FUROSEMIDE (LASIX) 20 MG TABLET    Take 20 mg by mouth once daily at 6am.    HYDROCHLOROTHIAZIDE (HYDRODIURIL) 25 MG TABLET    Take 1 tablet (25 mg total) by mouth once daily.    LETROZOLE (FEMARA) 2.5 MG TAB    Take 1 tablet by mouth once daily    LEVOTHYROXINE (SYNTHROID) 50 MCG TABLET    TAKE 1 TABLET BY MOUTH BEFORE BREAKFAST.    METOPROLOL SUCCINATE (TOPROL-XL) 25 MG 24 HR TABLET    Take 25 mg by mouth.    METOPROLOL TARTRATE (LOPRESSOR) 25 MG TABLET    TAKE 1 TABLET TWICE DAILY    MULTIVIT-MIN/IRON/FOLIC/LUTEIN (CENTRUM SILVER WOMEN ORAL)    Take 1 tablet by mouth once daily.    POTASSIUM CHLORIDE SA (K-DUR,KLOR-CON) 20 MEQ TABLET    Take 1 tablet (20 mEq total) by mouth once daily.

## 2022-11-24 NOTE — ASSESSMENT & PLAN NOTE
Lab Results   Component Value Date    CHOL 177 11/09/2021    CHOL 161 09/01/2020    CHOL 178 09/23/2019     Lab Results   Component Value Date    HDL 67 (H) 11/09/2021    HDL 66 (H) 09/01/2020    HDL 65 (H) 09/23/2019     No results found for: LDLCALC  Lab Results   Component Value Date    TRIG 83 11/09/2021    TRIG 106 09/01/2020    TRIG 113 09/23/2019     -Stable  -currently on atorvastatin, continue  -Low-cholesterol diet

## 2022-11-24 NOTE — ASSESSMENT & PLAN NOTE
-likely s/t to use of HCTZ and furosimide  -Does have KCL 20 meq however reports compliance issues due to large size of tablet, Switch to capsule formulary to ameliorate

## 2022-11-24 NOTE — ASSESSMENT & PLAN NOTE
-patient feeling generally well today  -Wellness labs reviewed and stable, hypokalemia  -age-appropriate screenings up-to-date  -immunizations reviewed , PNA 23 today  -encourage routine aerobic exercise 2 to 3 times a week  -increase fluid hydration

## 2022-11-24 NOTE — ASSESSMENT & PLAN NOTE
Lab Results   Component Value Date    TSH 1.6926 05/13/2022   -stable  -Currently on levothyroxine 50 mcg daily, continue

## 2022-11-24 NOTE — ASSESSMENT & PLAN NOTE
-BMI 37.42  -Low calorie Low Carb Diet  -Encourage some form of routine aerobic exercise for weight loss

## 2022-12-21 ENCOUNTER — LAB VISIT (OUTPATIENT)
Dept: LAB | Facility: HOSPITAL | Age: 67
End: 2022-12-21
Attending: INTERNAL MEDICINE
Payer: MEDICARE

## 2022-12-21 DIAGNOSIS — Z17.0 MALIGNANT NEOPLASM OF UPPER-INNER QUADRANT OF RIGHT BREAST IN FEMALE, ESTROGEN RECEPTOR POSITIVE: ICD-10-CM

## 2022-12-21 DIAGNOSIS — Z90.13 HISTORY OF BILATERAL MASTECTOMY: ICD-10-CM

## 2022-12-21 DIAGNOSIS — D05.12 DUCTAL CARCINOMA IN SITU (DCIS) OF LEFT BREAST: ICD-10-CM

## 2022-12-21 DIAGNOSIS — Z79.811 USE OF LETROZOLE (FEMARA): ICD-10-CM

## 2022-12-21 DIAGNOSIS — M81.0 OSTEOPOROSIS, UNSPECIFIED OSTEOPOROSIS TYPE, UNSPECIFIED PATHOLOGICAL FRACTURE PRESENCE: ICD-10-CM

## 2022-12-21 DIAGNOSIS — C50.211 MALIGNANT NEOPLASM OF UPPER-INNER QUADRANT OF RIGHT BREAST IN FEMALE, ESTROGEN RECEPTOR POSITIVE: ICD-10-CM

## 2022-12-21 DIAGNOSIS — Z85.3 HISTORY OF LEFT BREAST CANCER: ICD-10-CM

## 2022-12-21 LAB
ALBUMIN SERPL-MCNC: 4.2 G/DL (ref 3.4–4.8)
ALBUMIN/GLOB SERPL: 1.3 RATIO (ref 1.1–2)
ALP SERPL-CCNC: 109 UNIT/L (ref 40–150)
ALT SERPL-CCNC: 27 UNIT/L (ref 0–55)
AST SERPL-CCNC: 29 UNIT/L (ref 5–34)
BASOPHILS # BLD AUTO: 0.05 X10(3)/MCL (ref 0–0.2)
BASOPHILS NFR BLD AUTO: 0.7 %
BILIRUBIN DIRECT+TOT PNL SERPL-MCNC: 0.5 MG/DL
BUN SERPL-MCNC: 14.2 MG/DL (ref 9.8–20.1)
CALCIUM SERPL-MCNC: 10.4 MG/DL (ref 8.4–10.2)
CHLORIDE SERPL-SCNC: 102 MMOL/L (ref 98–107)
CO2 SERPL-SCNC: 31 MMOL/L (ref 23–31)
CREAT SERPL-MCNC: 0.74 MG/DL (ref 0.55–1.02)
EOSINOPHIL # BLD AUTO: 0.11 X10(3)/MCL (ref 0–0.9)
EOSINOPHIL NFR BLD AUTO: 1.6 %
ERYTHROCYTE [DISTWIDTH] IN BLOOD BY AUTOMATED COUNT: 12.7 % (ref 11–14.5)
GFR SERPLBLD CREATININE-BSD FMLA CKD-EPI: >60 MLS/MIN/1.73/M2
GLOBULIN SER-MCNC: 3.3 GM/DL (ref 2.4–3.5)
GLUCOSE SERPL-MCNC: 90 MG/DL (ref 82–115)
HCT VFR BLD AUTO: 40.8 % (ref 37–47)
HGB BLD-MCNC: 13 GM/DL (ref 12–16)
IMM GRANULOCYTES # BLD AUTO: 0.01 X10(3)/MCL (ref 0–0.04)
IMM GRANULOCYTES NFR BLD AUTO: 0.1 %
LYMPHOCYTES # BLD AUTO: 2.51 X10(3)/MCL (ref 0.6–4.6)
LYMPHOCYTES NFR BLD AUTO: 35.5 %
MCH RBC QN AUTO: 30.6 PG
MCHC RBC AUTO-ENTMCNC: 31.9 MG/DL (ref 33–36)
MCV RBC AUTO: 96 FL (ref 80–94)
MONOCYTES # BLD AUTO: 0.77 X10(3)/MCL (ref 0.1–1.3)
MONOCYTES NFR BLD AUTO: 10.9 %
NEUTROPHILS # BLD AUTO: 3.63 X10(3)/MCL (ref 2.1–9.2)
NEUTROPHILS NFR BLD AUTO: 51.2 %
PLATELET # BLD AUTO: 206 X10(3)/MCL (ref 140–371)
PMV BLD AUTO: 11.1 FL (ref 9.4–12.4)
POTASSIUM SERPL-SCNC: 3.7 MMOL/L (ref 3.5–5.1)
PROT SERPL-MCNC: 7.5 GM/DL (ref 5.8–7.6)
RBC # BLD AUTO: 4.25 X10(6)/MCL (ref 4.2–5.4)
SODIUM SERPL-SCNC: 144 MMOL/L (ref 136–145)
WBC # SPEC AUTO: 7.1 X10(3)/MCL (ref 4.5–11.5)

## 2022-12-21 PROCEDURE — 86300 IMMUNOASSAY TUMOR CA 15-3: CPT

## 2022-12-21 PROCEDURE — 85025 COMPLETE CBC W/AUTO DIFF WBC: CPT

## 2022-12-21 PROCEDURE — 36415 COLL VENOUS BLD VENIPUNCTURE: CPT

## 2022-12-21 PROCEDURE — 80053 COMPREHEN METABOLIC PANEL: CPT

## 2022-12-24 LAB — CANCER AG27-29 SERPL-ACNC: 20.8 U/ML

## 2022-12-27 ENCOUNTER — OFFICE VISIT (OUTPATIENT)
Dept: HEMATOLOGY/ONCOLOGY | Facility: CLINIC | Age: 67
End: 2022-12-27
Payer: MEDICARE

## 2022-12-27 VITALS
HEIGHT: 64 IN | OXYGEN SATURATION: 96 % | WEIGHT: 219 LBS | SYSTOLIC BLOOD PRESSURE: 144 MMHG | TEMPERATURE: 98 F | DIASTOLIC BLOOD PRESSURE: 81 MMHG | HEART RATE: 67 BPM | BODY MASS INDEX: 37.39 KG/M2

## 2022-12-27 DIAGNOSIS — Z79.811 USE OF LETROZOLE (FEMARA): ICD-10-CM

## 2022-12-27 DIAGNOSIS — C50.211 MALIGNANT NEOPLASM OF UPPER-INNER QUADRANT OF RIGHT BREAST IN FEMALE, ESTROGEN RECEPTOR POSITIVE: Primary | ICD-10-CM

## 2022-12-27 DIAGNOSIS — Z17.0 MALIGNANT NEOPLASM OF UPPER-INNER QUADRANT OF RIGHT BREAST IN FEMALE, ESTROGEN RECEPTOR POSITIVE: Primary | ICD-10-CM

## 2022-12-27 DIAGNOSIS — D05.12 DUCTAL CARCINOMA IN SITU (DCIS) OF LEFT BREAST: ICD-10-CM

## 2022-12-27 PROCEDURE — 99999 PR PBB SHADOW E&M-EST. PATIENT-LVL III: ICD-10-PCS | Mod: PBBFAC,,, | Performed by: NURSE PRACTITIONER

## 2022-12-27 PROCEDURE — 3288F FALL RISK ASSESSMENT DOCD: CPT | Mod: CPTII,S$GLB,, | Performed by: NURSE PRACTITIONER

## 2022-12-27 PROCEDURE — 1126F PR PAIN SEVERITY QUANTIFIED, NO PAIN PRESENT: ICD-10-PCS | Mod: CPTII,S$GLB,, | Performed by: NURSE PRACTITIONER

## 2022-12-27 PROCEDURE — 3288F PR FALLS RISK ASSESSMENT DOCUMENTED: ICD-10-PCS | Mod: CPTII,S$GLB,, | Performed by: NURSE PRACTITIONER

## 2022-12-27 PROCEDURE — 3079F DIAST BP 80-89 MM HG: CPT | Mod: CPTII,S$GLB,, | Performed by: NURSE PRACTITIONER

## 2022-12-27 PROCEDURE — 3079F PR MOST RECENT DIASTOLIC BLOOD PRESSURE 80-89 MM HG: ICD-10-PCS | Mod: CPTII,S$GLB,, | Performed by: NURSE PRACTITIONER

## 2022-12-27 PROCEDURE — 99214 PR OFFICE/OUTPT VISIT, EST, LEVL IV, 30-39 MIN: ICD-10-PCS | Mod: S$GLB,,, | Performed by: NURSE PRACTITIONER

## 2022-12-27 PROCEDURE — 99214 OFFICE O/P EST MOD 30 MIN: CPT | Mod: S$GLB,,, | Performed by: NURSE PRACTITIONER

## 2022-12-27 PROCEDURE — 1159F PR MEDICATION LIST DOCUMENTED IN MEDICAL RECORD: ICD-10-PCS | Mod: CPTII,S$GLB,, | Performed by: NURSE PRACTITIONER

## 2022-12-27 PROCEDURE — 1126F AMNT PAIN NOTED NONE PRSNT: CPT | Mod: CPTII,S$GLB,, | Performed by: NURSE PRACTITIONER

## 2022-12-27 PROCEDURE — 99999 PR PBB SHADOW E&M-EST. PATIENT-LVL III: CPT | Mod: PBBFAC,,, | Performed by: NURSE PRACTITIONER

## 2022-12-27 PROCEDURE — 1159F MED LIST DOCD IN RCRD: CPT | Mod: CPTII,S$GLB,, | Performed by: NURSE PRACTITIONER

## 2022-12-27 PROCEDURE — 3077F SYST BP >= 140 MM HG: CPT | Mod: CPTII,S$GLB,, | Performed by: NURSE PRACTITIONER

## 2022-12-27 PROCEDURE — 1101F PT FALLS ASSESS-DOCD LE1/YR: CPT | Mod: CPTII,S$GLB,, | Performed by: NURSE PRACTITIONER

## 2022-12-27 PROCEDURE — 3008F BODY MASS INDEX DOCD: CPT | Mod: CPTII,S$GLB,, | Performed by: NURSE PRACTITIONER

## 2022-12-27 PROCEDURE — 1160F PR REVIEW ALL MEDS BY PRESCRIBER/CLIN PHARMACIST DOCUMENTED: ICD-10-PCS | Mod: CPTII,S$GLB,, | Performed by: NURSE PRACTITIONER

## 2022-12-27 PROCEDURE — 1101F PR PT FALLS ASSESS DOC 0-1 FALLS W/OUT INJ PAST YR: ICD-10-PCS | Mod: CPTII,S$GLB,, | Performed by: NURSE PRACTITIONER

## 2022-12-27 PROCEDURE — 1160F RVW MEDS BY RX/DR IN RCRD: CPT | Mod: CPTII,S$GLB,, | Performed by: NURSE PRACTITIONER

## 2022-12-27 PROCEDURE — 3008F PR BODY MASS INDEX (BMI) DOCUMENTED: ICD-10-PCS | Mod: CPTII,S$GLB,, | Performed by: NURSE PRACTITIONER

## 2022-12-27 PROCEDURE — 3077F PR MOST RECENT SYSTOLIC BLOOD PRESSURE >= 140 MM HG: ICD-10-PCS | Mod: CPTII,S$GLB,, | Performed by: NURSE PRACTITIONER

## 2022-12-27 NOTE — PROGRESS NOTES
Are you taking hydroxychloroquine (aka plaquenil)  Alexa [phone]      Dr. Mauricio [facility]  Locations Clinic Hours Telephone Number     Sallie Johnsondley  [address] [place] [address]     Wednesday: 7:20AM - 4:00PM  Thursday:     7:20AM - 4:00PM     Friday:          7:20AM - 11:00AM       To schedule an appointment with  Dr. Mauricio,  please contact  Specialty [phone]       Sallie Nevarez  [address]  [place] [address]       Monday:       7:20AM - 4:00PM        Sallie Iverson  [address] [place] [address]       Tuesday:      7:20AM - 4:00PM          Thank you!    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 2022 BL SC MG at [facility]-which revealed on R MG lower inner quadrant anterior depth, there are two focal asymmetries. On L MG central region posterior depth there are indeterminate calcifications. At 12 o'clock left breast middle depth, there is a focal asymmetry with calcifications. There are stable post-therapy changes of the left breast. No detrimental change at the lumpectomy bed. There are stable post-core needle biopsy changes and a jesús shaped clip in the right breast. No detrimental change at this biopsy site. No other significant masses, calcifications, or other findings are seen in either breast. BIRADS-0 ; additional imaging needed.    2. 2022 BL DG MG/ BL US BREAST LIMITED at [facility]- which revealed on R MG at 4 o'clock subareolar anterior there is a 0.7 cm oval, circumscribed mass. On L MG 12 o'clock, anterior depth, there is a 1.2 cm focal asymmetry with associated heterogeneous calcifications. These findings correspond to the areas recalled on the screening examination dated 2022. No other suspicious masses, calcifications, or other signs of malignancy are  identified. On R US, at 3 o'clock subareolar there is a 0.6 x 0.4 x 0.4 cm oval, hypoechoic mass with indistinct margins. At 4 o'clock subareolar right breast, there is a 0.7 x 0.3 x 0.6 cm benign, simple cyst. These correspond to the mammographic findings in these regions. No other suspicious sonographic abnormality is seen. Specifically, no suspicious sonographic correlate is seen for the focal asymmetry in the 12 o'clock left breast. Benign-appearing lymph nodes are noted in the bilateral axillae. BIRADS-4 suspicious; biopsy recommended.    Bone density 9/21/22:   LUMBAR SPINE The L1 to L3 vertebral bone mineral density is equal to 1.121 g/cm squared with a T score of -0.5.  LEFT HIP The left femoral neck bone mineral density is equal to 0.744 g/cm squared with a T score of -2.1.   The left total hip bone mineral density is equal to 0.816 g/cm squared with a T score of -1.5.   RIGHT HIP The right femoral neck bone mineral density is equal to 0.694 g/cm squared with a T score of -2.5.  The right total hip bone mineral density is equal to 0.824 g/cm squared with a T score of -1.5.  FRAX 10-YEAR PROBABILITY OF FRACTURE: 21.4% risk of a major osteoporotic fracture and 2.9% risk of hip fracture in the next 10 years    Impression: Osteoporosis      Pathology:  3/16/2022:   [1] LEFT BREAST POSTERIOR DEPTH AMORPHOUS GROUPED CALCIFICATIONS CENTRAL REGION: DUCTAL CARCINOMA IN SITU, LOW GRADE CRIBRIFORM/MICROPAPILLARY TYPE, WITH MICROCALCIFICATIONS. DCIS is present on two core biopsies, the largest focus measuring less than 3 mm.   [2] LEFT BREAST 12 O' CLOCK ANTERIOR DEPTH FOCAL ASYMMETRY WITH HETEROGENOUS CALCIFICATIONS:SCLEROTIC FIBROADENOMATOID NODULE WITH DYSTROPIC CALCIFICATION.   [3] RIGHT BREAST 3 O' CLOCK SUBAREOLAR MASS: INVASIVE DUCTAL CARCINOMA, SOTO-ROMERO GRADE 1.  Carcinoma is present on two core biopsies and measures 5 mm.    %, %, Her2 neg, Ki67 58%.    4/25/2022:  [1]  BREAST, LEFT, SIMPLE  MASTECTOMY: DIMINUTIVE FOCUS OF RESIDUAL LOW GRADE DUCTAL CARCINOMA IN SITU.  [2]  BREAST, RIGHT, SIMPLE MASTECTOMY: INVASIVE DUCTAL CARCINOMA, LOW GRADE, GRADE 1(OF 3).  -Tumor size:  4.0 mm. pT Category:  pT1a  pN0  BREAST, RIGHT, AXILLARY SENTINEL NODE #4/4: NEGATIVE FOR METASTATIC CARCINOMA.  The patient's previous history of low grade invasive ductal carcinoma is noted from surgical pathology report [id number] ER (100%), ID (100%), HER2 negative, Ki-67 high (58%).              CLINICAL HISTORY:       Patient: Riana Pastrana is a 67 y.o. female kindly referred for history of breast cancer.  I do not have record of her diagnosis and treatment of her original breast cancer.    Patient states that she was diagnosed with breast cancer in 2005.  She had Left lumpectomy done, with no chemotherapy or radiation. She took tamoxifen for 5 years. She reports that she was treated by Dr. Robbin Kenny. She says that she was seen at [facility], but since her insurance has changed she would like to establish care here.     Patient reports menarche at 13. She had 3 pregnancies, 2 live children, 1 miscarriage. Her first child was at age 20.She had a partial hysterectomy at age 29. She admits to oral contraceptives for about 3 years. No hormone replacement therapy.     Her sister had ovarian cancer diagnosed in her 50's, currently still alive at age 80.    She reports that she had a mass in her stomach and it was removed in 2017 along with her ovaries at [facility].  Again I do not have the records, but imaging studies none here include CT scan of the abdomen and pelvis done on 5/21/2017 for an abdominal distention showed a 22 x 21 x 16 cm complex septated peripherally enhancing mass which appears to arise from the right adnexa, presumably ovarian in origin.  Uterus not identified.  Cystic mass in the abdomen and pelvis displaces the urinary bladder inferiorly.  Liver is is heterogeneous in attenuation and contains  2 low-attenuation masses in the dome, too small to accurately characterize by CT.  Spleen normal in size. CT of the chest showed cardiomegaly with moderate pericardial effusion.  Bilateral lower lobe consolidation with atelectasis and bilateral pleural effusions, left greater than right. Large abdominal cystic mass of unknown etiology. Further assessment with dedicated imaging of the abdomen and pelvis recommended. US pelvis 5/22/2021:  A large, multilocular mass consistent with the CT findings is identified measuring 27.5 x 20.1 x 19.6 cm. There are multiple septations or separate fluid loculations in the large cystic mass. Low level internal echogenicity is also visible in the cyst fluid with dependent debris also identified. No free pelvic fluid is visible. This lesion is suspicious for neoplasm of pelvic and likely ovarian origin. The uterus is not identified and surgically absent by patient history. 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  Ascites  suggestive of peritoneal implants,  the ascites is new from the previous study. No Path available    On 2/7/2022 screening mammogram: INCOMPLETE: NEEDS ADDITIONAL IMAGING EVALUATION  Right breast focal asymmetries, left breast calcifications, and left breast focal asymmetry with calcifications need further evaluation. BI-RADS 0: Incomplete. Need additional imaging evaluation.     On 2/24/2022 Diagnostic right mammogram and Breast US: SUSPICIOUS OF MALIGNANCY  1. Oval, hypoechoic mass with indistinct margins in the 3:00 subareolar right breast is suspicious. Right breast ultrasound-guided biopsy is recommended.  2. Amorphous, grouped calcifications in the central left breast, posterior depth, are suspicious. Left breast stereotactic guided biopsy is recommended.  3. Focal asymmetry with associated heterogeneous calcifications in the 12:00 left breast, anterior depth, is suspicious. Left breast stereotactic guided  biopsy is recommended.     On 3/16/2022 she is schedule for breast bx. otherwise she is doing well and voices no concerns.  No fever, chills, sweats.  No chest pain or shortness of breath.  No changes in bowel habits.  No abdominal or pelvic pain.  No neurological symptoms.    [1] LEFT BREAST POSTERIOR DEPTH AMORPHOUS GROUPED CALCIFICATIONS CENTRAL REGION:  DUCTAL CARCINOMA IN SITU, LOW GRADE CRIBRIFORM/MICROPAPILLARY TYPE, WITH MICROCALCIFICATIONS.  COMMENT: DCIS is present on two core biopsies, the largest focus measuring less than 3 mm. The results of ER/SD analysis will be the subject of an addendum report.  [2] LEFT BREAST 12 O' CLOCK ANTERIOR DEPTH FOCAL ASYMMETRY WITH HETEROGENOUS CALCIFICATIONS:  SCLEROTIC FIBROADENOMATOID NODULE WITH DYSTROPIC CALCIFICATION.  [3] RIGHT BREAST 3 O' CLOCK SUBAREOLAR MASS:  INVASIVE DUCTAL CARCINOMA, SOTO-ROMERO GRADE 1.  COMMENT: Carcinoma is present on two core biopsies and measures 5 mm.    %, %, Her2 neg, Ki67 58%.    Chief Complaint: No Concerns today        Interval History:    She underwent bilateral mastectomies 4/25/2022. Overall,  she has been doing well. She denies any fever, chills, sweats. No chest pain or shortness of breath.  No changes in bowel habits.  Oncotype showed a recurrence score of 18 with a distant recurrent risk at 9 years of 5% with AI or tamoxifen alone and <1% absolute chemotherapy benefit.  She was started on aromatase inhibitors in March 2022. Tolerating well. She started Prolia in 10/2022. She has no complaints today.       Past Medical History:   Diagnosis Date    History of bilateral breast cancer     left in 2007; bilateral in 2022    Hyperlipidemia 5/13/2022    Hypertension     Hypothyroidism 5/13/2022    Severe obesity (BMI 35.0-39.9) with comorbidity 5/13/2022      Past Surgical History:   Procedure Laterality Date    BREAST BIOPSY      BREAST SURGERY      HYSTERECTOMY      TONSILLECTOMY       Family History   Problem  Relation Age of Onset    Ovarian cancer Sister 50     Social Connections: Not on file       Review of patient's allergies indicates:   Allergen Reactions    Penicillins Other (See Comments)     UNKNOWN REACTION. ALLERGY SINCE CHILDHOOD        Current Outpatient Medications on File Prior to Visit   Medication Sig Dispense Refill    atorvastatin (LIPITOR) 20 MG tablet Take 1 tablet (20 mg total) by mouth once daily. 90 tablet 2    cholecalciferol, vitamin D3, 100 mcg (4,000 unit) Cap capsule Take 400 Units by mouth once daily. 2 po daily      denosumab (PROLIA) 60 mg/mL Syrg Inject 60 mg into the skin.      furosemide (LASIX) 20 MG tablet TAKE 1 TABLET EVERY DAY AS DIRECTED 90 tablet 0    hydroCHLOROthiazide (HYDRODIURIL) 25 MG tablet Take 1 tablet (25 mg total) by mouth once daily. 90 tablet 1    letrozole (FEMARA) 2.5 mg Tab Take 1 tablet by mouth once daily 30 tablet 2    levothyroxine (SYNTHROID) 50 MCG tablet TAKE 1 TABLET BY MOUTH BEFORE BREAKFAST. 90 tablet 1    metoprolol tartrate (LOPRESSOR) 25 MG tablet TAKE 1 TABLET TWICE DAILY 180 tablet 1    multivit-min/iron/folic/lutein (CENTRUM SILVER WOMEN ORAL) Take 1 tablet by mouth once daily.       No current facility-administered medications on file prior to visit.      Review of Systems   Constitutional:  Negative for activity change, appetite change, chills, diaphoresis, fatigue, fever and unexpected weight change.   HENT:  Negative for mouth dryness, mouth sores, nosebleeds, postnasal drip, sinus pressure/congestion, sore throat and trouble swallowing.    Eyes:  Negative for visual disturbance.   Respiratory:  Negative for apnea, cough, choking, chest tightness and shortness of breath.    Cardiovascular:  Negative for chest pain, palpitations and leg swelling.   Gastrointestinal:  Negative for abdominal distention, abdominal pain, blood in stool, change in bowel habit, constipation, diarrhea, nausea, vomiting and change in bowel habit.   Endocrine: Negative.   "  Genitourinary:  Negative for difficulty urinating, dysuria, frequency, hematuria and urgency.   Musculoskeletal:  Negative for arthralgias, back pain, myalgias and neck pain.   Integumentary:  Negative for rash, breast mass, breast discharge and breast tenderness.   Neurological:  Negative for dizziness, tremors, syncope, speech difficulty, weakness, light-headedness, numbness, headaches and memory loss.   Hematological:  Does not bruise/bleed easily.   Psychiatric/Behavioral:  Negative for confusion, hallucinations, sleep disturbance and suicidal ideas.    Breast: Negative for mass and tenderness           Vitals:    12/27/22 1038   BP: (!) 144/81   BP Location: Right arm   Patient Position: Sitting   Pulse: 67   Temp: 98.1 °F (36.7 °C)   TempSrc: Oral   SpO2: 96%   Weight: 99.3 kg (219 lb)   Height: 5' 4" (1.626 m)      Physical Exam  Vitals and nursing note reviewed.   Constitutional:       General: She is not in acute distress.     Appearance: She is well-developed. She is obese.   HENT:      Head: Normocephalic and atraumatic.      Mouth/Throat:      Mouth: Mucous membranes are moist.   Eyes:      General: No scleral icterus.     Extraocular Movements: Extraocular movements intact.      Conjunctiva/sclera: Conjunctivae normal.      Pupils: Pupils are equal, round, and reactive to light.   Neck:      Vascular: No JVD.   Cardiovascular:      Rate and Rhythm: Normal rate and regular rhythm.      Heart sounds: No murmur heard.  Pulmonary:      Effort: Pulmonary effort is normal.      Breath sounds: Normal breath sounds. No wheezing or rhonchi.   Chest:      Chest wall: No deformity or tenderness.   Breasts:     Right: Absent. No swelling, mass, skin change or tenderness.      Left: Absent. No swelling, mass, skin change or tenderness.   Abdominal:      General: Bowel sounds are normal. There is no distension.      Palpations: Abdomen is soft. There is no mass.      Tenderness: There is no abdominal tenderness. "   Musculoskeletal:         General: No swelling or deformity.      Cervical back: Neck supple.   Lymphadenopathy:      Cervical: No cervical adenopathy.      Upper Body:      Right upper body: No supraclavicular or axillary adenopathy.      Left upper body: No supraclavicular or axillary adenopathy.      Lower Body: No right inguinal adenopathy. No left inguinal adenopathy.   Skin:     General: Skin is warm.      Coloration: Skin is not jaundiced.      Findings: No lesion or rash.      Nails: There is no clubbing.   Neurological:      General: No focal deficit present.      Mental Status: She is alert and oriented to person, place, and time.      Sensory: Sensation is intact.      Motor: Motor function is intact.      Gait: Gait is intact.   Psychiatric:         Attention and Perception: Attention normal.         Mood and Affect: Mood and affect normal.         Speech: Speech normal.         Behavior: Behavior is cooperative.         Thought Content: Thought content normal.         Cognition and Memory: Cognition normal.         Judgment: Judgment normal.     ECOG SCORE             Laboratory:   Latest Reference Range & Units 05/10/22 10:54   WBC 4.5 - 11.5 x10(3)/mcL 6.0   RBC 4.20 - 5.40 x10(6)/mcL 4.08 (L)   Hemoglobin 12.0 - 16.0 gm/dL 12.6   Hematocrit 37.0 - 47.0 % 38.2   MCV 80.0 - 94.0 fL 93.6   MCH 27.0 - 31.0 pg 30.9   MCHC 33.0 - 36.0 mg/dL 33.0   RDW 11.5 - 17.0 % 12.5   Platelets 130 - 400 x10(3)/mcL 255   (L): Data is abnormally low         Assessment:       No diagnosis found.      1) Left breast cancer-diagnosed 2005   --lumpectomy/SLNB, XRT, and 5 years of Tamoxifen   2) Mucinous cystosarcoma-Dx 2017    --SP BSO   3) Bilateral breast cancer   --DCIS-clinical anatomic stage IA / prognostic stage IA (cT1b cN0 M0) left breast Dx 3/16/2022   --Clinical anatomic stage IA / prognostic stage IA (cT1b cN0 M0) Right breast    --%, %, Her 2 neg, Ki 67 58%, Grade 1   --Bilateral mastectomies  4/25/2022, Right SLND      Plan:         Started Femara 3/2022    Baseline DEXA scan with osteoporosis --right femoral neck bone mineral density with a T score of -2.5. Otherwise osteopenia  She will cont Femara for now and re eval next Bone density. If bone mass worsen then with change to Tamoxifen  Prolia started in 10/2022- Next due in 4/2023.   Cont Ca and vit D supplements and weight bearing exercise  No breast imaging as she has bilateral mastectomies  Return to clinic in 3 months with labs: CBC, CMP, CA 27-29    Encouraged to call with questions or problems  The patient was given ample opportunity to ask questions and they were all answered to satisfaction; patient demonstrated understanding of what we discussed and is agreeable to the plan.    NEREIDA Robert

## 2023-01-04 ENCOUNTER — TELEPHONE (OUTPATIENT)
Dept: INTERNAL MEDICINE | Facility: CLINIC | Age: 68
End: 2023-01-04
Payer: MEDICARE

## 2023-01-04 DIAGNOSIS — M19.049 OSTEOARTHRITIS OF HAND, UNSPECIFIED LATERALITY, UNSPECIFIED OSTEOARTHRITIS TYPE: Primary | ICD-10-CM

## 2023-01-04 RX ORDER — MELOXICAM 7.5 MG/1
7.5 TABLET ORAL DAILY PRN
Qty: 30 TABLET | Refills: 2 | Status: SHIPPED | OUTPATIENT
Start: 2023-01-04 | End: 2023-02-03

## 2023-01-04 NOTE — TELEPHONE ENCOUNTER
I looked into the last office note.  I don't see that anything was discussed about prescribing any medications for arthritis for the patients hand pain.  She no longer wants to take Tylenol.  Do you recall discussing any other medications? Possibly Mobic?

## 2023-01-04 NOTE — TELEPHONE ENCOUNTER
Please continue to use Tylenol Arthritis daily, however on bad days Okay to utilize Mobic 7.5 mg once daily p.r.n. arthritis pain.  Encouraged to take on a full stomach sometimes may cause stomach.  Orders Placed This Encounter    meloxicam (MOBIC) 7.5 MG tablet

## 2023-01-04 NOTE — TELEPHONE ENCOUNTER
----- Message from Leann Govea sent at 1/4/2023  9:31 AM CST -----  Regarding: R hand pain  R hand pain, last visit Luis Felipe said Arthritis and could write a script. Patient wanted to try just using Tylenol. But not working, please send script to [facility] in [place].    [facility]  [phone]

## 2023-02-10 NOTE — PROGRESS NOTES
[facility] Breast Surg  Breast Surgical Oncology  Follow-Up Patient Office Visit       Referring Provider: No ref. provider found   PCP: Chely Cameron MD   Care Team:   Medical Oncologist: Judi Richards MD   Radiation Oncologist: No care team member to display   OBGYN: No data on file.     Chief Complaint:   Chief Complaint   Patient presents with    Follow-up     6 Month CBE Followup, patient states that she's doing good no breast concerns        Subjective:   Treatment History:  Left Breast Cancer Dx in 2005:  1. Left Lumpectomy/SLNB  2. XRT  3. 5 years of Tamoxifen      Bilateral Breast Cancer Diagnosed in 2022  1. Bilateral Simple Mastectomy and Right [place] Lymph Node Biopsy 4/25/2022  2. Oncotype Dx RR 18, no overall benefit from chemotherapy  3. Endocrine therapy started 6/2022, expected for 5 years  4. [facility] Genetic Testing 5/17/2022 - Negative     Interval History:   02/14/2023 - Riana Pastrana returns today for 6 month follow up of breast cancer. She is doing well and has no concerns. She reports good ROM in the bilateral UE and denies symptoms of swelling in the arms or hands. She has no breast concerns.      HPI:  Riana Pastrana initially presents on 03/29/22 at age 66 Years with a past history of left breast cancer diagnosed in 2005 SP lumpectomy/SLNB, XRT, and 5 years of Tamoxifen as well as recent mucinous cystosarcoma SP BSO diagnosed in 2017 who now presents with bilateral breast cancer. (1) AJCC 8th ed clinical anatomic stage IA / prognostic stage IA (cT1b cN0 M0) Right breast 3 o'clock subareolar invasive ductal carcinoma, grade 1, %,  %, HER2 0 - negative on IHC and Ki67 58%. (2) AJCC 8th ed clinical anatomic stage 0 / prognostic stage 0 (cTis cN0 M0) Left breast central posterior depth ductal carcinoma in situ, grade 1, % and %.     She is s/p bilateral simple mastectomy and right sentinel lymph node biopsy.  Surgical pathology of the right breast revealed invasive ductal carcinoma grade 1 measuring 4 mm. Margins negative. Four right sentinel lymph nodes are negative for malignancy (0/4). Surgical pathology of the left breast revealed central region/6:00 biopsy site with diminutive focus of residual low grade DCIS. Margins also negative.  She has started endocrine therapy and had genetic testing which was negative.    A detailed patient history was obtained and reviewed.     Imagin. 2022 BL SC MG at [facility]-which revealed on R MG lower inner quadrant anterior depth, there are two focal asymmetries. On L MG central region posterior depth there are indeterminate calcifications. At 12 o'clock left breast middle depth, there is a focal asymmetry with calcifications. There are stable post-therapy changes of the left breast. No detrimental change at the lumpectomy bed. There are stable post-core needle biopsy changes and a jesús shaped clip in the right breast. No detrimental change at this biopsy site. No other significant masses, calcifications, or other findings are seen in either breast. BIRADS-0 ; additional imaging needed.    2. 2022 BL DG MG/ BL US BREAST LIMITED at [facility]- which revealed on R MG at 4 o'clock subareolar anterior there is a 0.7 cm oval, circumscribed mass. On L MG 12 o'clock, anterior depth, there is a 1.2 cm focal asymmetry with associated heterogeneous calcifications. These findings correspond to the areas recalled on the screening examination dated 2022. No other suspicious masses, calcifications, or other signs of malignancy are identified. On R US, at 3 o'clock subareolar there is a 0.6 x 0.4 x 0.4 cm oval, hypoechoic mass with indistinct margins. At 4 o'clock subareolar right breast, there is a 0.7 x 0.3 x 0.6 cm benign, simple cyst. These correspond to the mammographic findings in these regions. No other suspicious sonographic abnormality is seen. Specifically, no suspicious  sonographic correlate is seen for the focal asymmetry in the 12 o'clock left breast. Benign-appearing lymph nodes are noted in the bilateral axillae. BIRADS-4 suspicious; biopsy recommended.     Pathology:  1. 3/16/2022 Ultrasound-guided Core Needle Biopsy Right Breast 3 o'clock Subareolar Mass - Invasive ductal carcinoma, grade 1  %  %  HER2 Negative  Ki67 - 58% High    2.3/16/2022 Stereotactic guided Core Needle Biopsy Left Breast Posterior Depth Amorphous Grouped Calcifications Central Region - Ductal carcinoma in situ, grade 1  %  CO 99%    3. 3/16/2022 Stereotactic guided Core Needle Biopsy Left Breast 12 o'clock Anterior Depth Focal Asymmetry with Heterogenous Calcifications - Sclerotic fibroadenomatoid nodule with dystropic calcification     4. 4/25/2022 Bilateral Simple Mastectomy and Right SLNB - Surgical pathology of the right breast revealed invasive ductal carcinoma grade 1 measuring 4 mm. Margins negative. Four right sentinel lymph nodes are negative for malignancy (0/4). Surgical pathology of the left breast revealed central region/6:00 biopsy site with diminutive focus of residual low grade DCIS. Margins also negative.      OB/GYN History:  Menarche Onset: 13  Menopause: Post, at age:  Hormonal birth control (duration): yes  Pregnancies: 3  Age at first pregnancy: ?  Child births: 2  Hysterectomy: yes, partial hysterectomy at age 29  Oophorectomy: yes, 2017 after diagnosis of adnexal mass suspicious - final pathology after BSO was mucinous cystosarcoma  HRT: no     Other:  # of breast biopsies (when and pathology results): 1 Left breast cancer in 2005  MG breast density: Category B (Scattered fibroglandular)  Prior thoracic RT: none  Genetic testing: none  Ashkenazi [employer] descent: No    Family History:  Family History   Problem Relation Age of Onset    Ovarian cancer Sister 50        Patient History:  Past Medical History:   Diagnosis Date    History of bilateral breast cancer      left in 2007; bilateral in 2022    Hyperlipidemia 5/13/2022    Hypertension     Hypothyroidism 5/13/2022    Severe obesity (BMI 35.0-39.9) with comorbidity 5/13/2022       Past Surgical History:   Procedure Laterality Date    BREAST BIOPSY      BREAST SURGERY      HYSTERECTOMY      TONSILLECTOMY         Social History     Socioeconomic History    Marital status:    Tobacco Use    Smoking status: Never    Smokeless tobacco: Never   Substance and Sexual Activity    Alcohol use: Never    Drug use: Never       Immunization History   Administered Date(s) Administered    COVID-19, MRNA, LN-S, PF (Pfizer) (Purple Cap) 02/25/2021, 03/19/2021, 11/01/2021    COVID-19, mRNA, LNP-S, bivalent booster, PF (PFIZER OMICRON) 10/26/2022    Influenza - Quadrivalent - High Dose - PF (65 years and older) 11/01/2021, 10/26/2022    Influenza - Quadrivalent - PF (6-35 months) 10/01/2018    Influenza - Quadrivalent - PF *Preferred* (6 months and older) 11/07/2019    Pneumococcal Conjugate - 13 Valent 11/12/2021    Pneumococcal Polysaccharide - 23 Valent 11/23/2022       Medications/Allergies:  Current Outpatient Medications on File Prior to Visit   Medication Sig Dispense Refill    atorvastatin (LIPITOR) 20 MG tablet Take 1 tablet (20 mg total) by mouth once daily. 90 tablet 2    cholecalciferol, vitamin D3, 100 mcg (4,000 unit) Cap capsule Take 400 Units by mouth once daily. 2 po daily      denosumab (PROLIA) 60 mg/mL Syrg Inject 60 mg into the skin.      furosemide (LASIX) 20 MG tablet TAKE 1 TABLET EVERY DAY AS DIRECTED 90 tablet 0    hydroCHLOROthiazide (HYDRODIURIL) 25 MG tablet Take 1 tablet (25 mg total) by mouth once daily. 90 tablet 1    letrozole (FEMARA) 2.5 mg Tab Take 1 tablet by mouth once daily 30 tablet 2    levothyroxine (SYNTHROID) 50 MCG tablet TAKE 1 TABLET BY MOUTH BEFORE BREAKFAST. 90 tablet 1    metoprolol tartrate (LOPRESSOR) 25 MG tablet TAKE 1 TABLET TWICE DAILY 180 tablet 1    multivit-min/iron/folic/lutein  (CENTRUM SILVER WOMEN ORAL) Take 1 tablet by mouth once daily.       No current facility-administered medications on file prior to visit.       Review of patient's allergies indicates:   Allergen Reactions    Penicillins Other (See Comments)     UNKNOWN REACTION. ALLERGY SINCE CHILDHOOD         Review of Systems:  Pertinent items are noted in HPI.     Objective:     Vitals:  Vitals:    02/14/23 0842   BP: (!) 146/84   Pulse: 63   Resp: 18   Temp: 97.8 °F (36.6 °C)         Body mass index is 37.76 kg/m².     Physical Exam:  General: The patient is awake, alert and oriented times three. The patient is well nourished and in no acute distress.  Neck: There is no evidence of palpable cervical, supraclavicular or axillary adenopathy. The neck is supple. The thyroid is not enlarged.  Musculoskeletal: The patient has a normal range of motion of her bilateral upper extremities.  Chest: Examination of the chest wall fails to reveal any obvious abnormalities. Nonlabored breathing, symmetric expansion.  Breast: Examination of the Mastectomy sites bilaterally fails to reveal any dominant masses or areas of significant focal nodularity. The nipples are surgically absent bilaterally. There are no significant skin changes overlying the breasts. There is no skin dimpling with movement of the pectoralis.  Abdomen: The abdomen is soft, flat, nontender and nondistended.  Integumentary: no rashes or skin lesions present  Neurologic: cranial nerves intact, no signs of peripheral neurological deficit, motor/sensory function intact      Assessment and Plan:       Riana was seen today for follow-up.    Diagnoses and all orders for this visit:    Malignant neoplasm of central portion of right breast in female, estrogen receptor positive    Ductal carcinoma in situ (DCIS) of left breast    History of bilateral mastectomy    Personal history of breast cancer            Plan:       Healthy lifestyle guidelines were reviewed. She was  encouraged to engage in regular exercise, maintain a healthy body weight, and avoid excessive alcohol consumption. Healthy nutritional guidelines were also discussed. Self-breast examination was reviewed with the patient in detail and she was encouraged to perform this on a monthly basis.    Continue endocrine therapy and follow up with medical oncology.    RTC in 6 months for CBE.      All of her questions were answered. She was advised to call if she develops any questions or concerns.    Sirena Lee PA-C              Total time on the date of the visit ranged from 30-39 mins (49537). Total time includes both face-to-face and non-face-to-face time personally spent by myself on the day of the visit.    Non-face-to-face time included:  _X_ preparing to see the patient such as reviewing the patient record  __ obtaining and reviewing separately obtained history  _X_ independently interpreting results  _X_ documenting clinical information in electronic health record.    Face-to-face time included:  _X_ performing an appropriate history and examination  _X_ communicating results to the patient  _X_ counseling and educating the patient  __ ordering appropriate medications  __ ordering appropriate tests  _X_ ordering appropriate procedures (including follow-up)  _X_ answering any questions the patient had    Total Time spent on date of visit: 35 minutes

## 2023-02-14 ENCOUNTER — OFFICE VISIT (OUTPATIENT)
Dept: SURGERY | Facility: CLINIC | Age: 68
End: 2023-02-14
Payer: MEDICARE

## 2023-02-14 VITALS
WEIGHT: 220 LBS | DIASTOLIC BLOOD PRESSURE: 84 MMHG | HEART RATE: 63 BPM | TEMPERATURE: 98 F | OXYGEN SATURATION: 97 % | SYSTOLIC BLOOD PRESSURE: 146 MMHG | RESPIRATION RATE: 18 BRPM | HEIGHT: 64 IN | BODY MASS INDEX: 37.56 KG/M2

## 2023-02-14 DIAGNOSIS — D05.12 DUCTAL CARCINOMA IN SITU (DCIS) OF LEFT BREAST: ICD-10-CM

## 2023-02-14 DIAGNOSIS — C50.111 MALIGNANT NEOPLASM OF CENTRAL PORTION OF RIGHT BREAST IN FEMALE, ESTROGEN RECEPTOR POSITIVE: Primary | ICD-10-CM

## 2023-02-14 DIAGNOSIS — Z17.0 MALIGNANT NEOPLASM OF CENTRAL PORTION OF RIGHT BREAST IN FEMALE, ESTROGEN RECEPTOR POSITIVE: Primary | ICD-10-CM

## 2023-02-14 DIAGNOSIS — Z85.3 PERSONAL HISTORY OF BREAST CANCER: ICD-10-CM

## 2023-02-14 DIAGNOSIS — Z90.13 HISTORY OF BILATERAL MASTECTOMY: ICD-10-CM

## 2023-02-14 PROCEDURE — 3077F SYST BP >= 140 MM HG: CPT | Mod: CPTII,S$GLB,, | Performed by: PHYSICIAN ASSISTANT

## 2023-02-14 PROCEDURE — 99214 OFFICE O/P EST MOD 30 MIN: CPT | Mod: S$GLB,,, | Performed by: PHYSICIAN ASSISTANT

## 2023-02-14 PROCEDURE — 3077F PR MOST RECENT SYSTOLIC BLOOD PRESSURE >= 140 MM HG: ICD-10-PCS | Mod: CPTII,S$GLB,, | Performed by: PHYSICIAN ASSISTANT

## 2023-02-14 PROCEDURE — 3079F PR MOST RECENT DIASTOLIC BLOOD PRESSURE 80-89 MM HG: ICD-10-PCS | Mod: CPTII,S$GLB,, | Performed by: PHYSICIAN ASSISTANT

## 2023-02-14 PROCEDURE — 1159F PR MEDICATION LIST DOCUMENTED IN MEDICAL RECORD: ICD-10-PCS | Mod: CPTII,S$GLB,, | Performed by: PHYSICIAN ASSISTANT

## 2023-02-14 PROCEDURE — 99999 PR PBB SHADOW E&M-EST. PATIENT-LVL IV: ICD-10-PCS | Mod: PBBFAC,,, | Performed by: PHYSICIAN ASSISTANT

## 2023-02-14 PROCEDURE — 3008F PR BODY MASS INDEX (BMI) DOCUMENTED: ICD-10-PCS | Mod: CPTII,S$GLB,, | Performed by: PHYSICIAN ASSISTANT

## 2023-02-14 PROCEDURE — 99214 PR OFFICE/OUTPT VISIT, EST, LEVL IV, 30-39 MIN: ICD-10-PCS | Mod: S$GLB,,, | Performed by: PHYSICIAN ASSISTANT

## 2023-02-14 PROCEDURE — 99999 PR PBB SHADOW E&M-EST. PATIENT-LVL IV: CPT | Mod: PBBFAC,,, | Performed by: PHYSICIAN ASSISTANT

## 2023-02-14 PROCEDURE — 1159F MED LIST DOCD IN RCRD: CPT | Mod: CPTII,S$GLB,, | Performed by: PHYSICIAN ASSISTANT

## 2023-02-14 PROCEDURE — 3008F BODY MASS INDEX DOCD: CPT | Mod: CPTII,S$GLB,, | Performed by: PHYSICIAN ASSISTANT

## 2023-02-14 PROCEDURE — 3079F DIAST BP 80-89 MM HG: CPT | Mod: CPTII,S$GLB,, | Performed by: PHYSICIAN ASSISTANT

## 2023-02-27 PROBLEM — Z00.00 MEDICARE ANNUAL WELLNESS VISIT, SUBSEQUENT: Status: RESOLVED | Noted: 2022-11-23 | Resolved: 2023-02-27

## 2023-03-16 ENCOUNTER — TELEPHONE (OUTPATIENT)
Dept: INTERNAL MEDICINE | Facility: CLINIC | Age: 68
End: 2023-03-16
Payer: MEDICARE

## 2023-03-16 DIAGNOSIS — Z79.899 ON POTASSIUM WASTING DIURETIC THERAPY: Primary | ICD-10-CM

## 2023-03-16 RX ORDER — POTASSIUM CHLORIDE 750 MG/1
10 CAPSULE, EXTENDED RELEASE ORAL ONCE
Qty: 90 CAPSULE | Refills: 1 | Status: SHIPPED | OUTPATIENT
Start: 2023-03-16 | End: 2023-03-17 | Stop reason: SDUPTHER

## 2023-03-16 NOTE — TELEPHONE ENCOUNTER
Pharmacy requesting refill of Pt Potassium Chloride ER 10 meq daily. Called Pt to verify she is currently on medication. Pt phone number did not have voice mail capabilities.

## 2023-03-16 NOTE — TELEPHONE ENCOUNTER
Spoke with pt to verify if she was currently taking this medication she states she was taking it but didn't get any refill. Also I checked pt chart and saw that it was changed from 20 meq to 10 meq but the medication is not listed in pts medications for me to refill.

## 2023-03-17 DIAGNOSIS — Z79.899 ON POTASSIUM WASTING DIURETIC THERAPY: ICD-10-CM

## 2023-03-17 RX ORDER — POTASSIUM CHLORIDE 750 MG/1
10 CAPSULE, EXTENDED RELEASE ORAL DAILY
Qty: 90 CAPSULE | Refills: 1 | Status: SHIPPED | OUTPATIENT
Start: 2023-03-17 | End: 2023-03-20 | Stop reason: SDUPTHER

## 2023-03-20 ENCOUNTER — TELEPHONE (OUTPATIENT)
Dept: INTERNAL MEDICINE | Facility: CLINIC | Age: 68
End: 2023-03-20
Payer: MEDICARE

## 2023-03-20 DIAGNOSIS — Z79.899 ON POTASSIUM WASTING DIURETIC THERAPY: ICD-10-CM

## 2023-03-20 RX ORDER — POTASSIUM CHLORIDE 750 MG/1
10 CAPSULE, EXTENDED RELEASE ORAL DAILY
Qty: 90 CAPSULE | Refills: 1 | Status: SHIPPED | OUTPATIENT
Start: 2023-03-20 | End: 2023-08-30

## 2023-03-20 NOTE — TELEPHONE ENCOUNTER
----- Message from Leann Govea sent at 3/20/2023  3:00 PM CDT -----  Regarding: refill  MEDICATION REFILL REQUEST      PATIENT PHONE #:[phone]     :57     [facility]     PHARMACY PHONE #: [phone]     ALLERGIES:     MESSAGE    Potassium ER 10 meq/ cap              bid

## 2023-03-24 ENCOUNTER — LAB VISIT (OUTPATIENT)
Dept: LAB | Facility: HOSPITAL | Age: 68
End: 2023-03-24
Attending: INTERNAL MEDICINE
Payer: MEDICARE

## 2023-03-24 DIAGNOSIS — E03.9 HYPOTHYROIDISM, UNSPECIFIED TYPE: Chronic | ICD-10-CM

## 2023-03-24 DIAGNOSIS — C50.211 MALIGNANT NEOPLASM OF UPPER-INNER QUADRANT OF RIGHT BREAST IN FEMALE, ESTROGEN RECEPTOR POSITIVE: ICD-10-CM

## 2023-03-24 DIAGNOSIS — Z79.811 USE OF LETROZOLE (FEMARA): ICD-10-CM

## 2023-03-24 DIAGNOSIS — D05.12 DUCTAL CARCINOMA IN SITU (DCIS) OF LEFT BREAST: ICD-10-CM

## 2023-03-24 DIAGNOSIS — E87.6 HYPOKALEMIA: ICD-10-CM

## 2023-03-24 DIAGNOSIS — Z17.0 MALIGNANT NEOPLASM OF UPPER-INNER QUADRANT OF RIGHT BREAST IN FEMALE, ESTROGEN RECEPTOR POSITIVE: ICD-10-CM

## 2023-03-24 LAB
ALBUMIN SERPL-MCNC: 4.2 G/DL (ref 3.4–4.8)
ALBUMIN/GLOB SERPL: 1.3 RATIO (ref 1.1–2)
ALP SERPL-CCNC: 98 UNIT/L (ref 40–150)
ALT SERPL-CCNC: 31 UNIT/L (ref 0–55)
AST SERPL-CCNC: 34 UNIT/L (ref 5–34)
BASOPHILS # BLD AUTO: 0.05 X10(3)/MCL (ref 0–0.2)
BASOPHILS NFR BLD AUTO: 0.7 %
BILIRUBIN DIRECT+TOT PNL SERPL-MCNC: 0.8 MG/DL
BUN SERPL-MCNC: 11.7 MG/DL (ref 9.8–20.1)
CALCIUM SERPL-MCNC: 10.4 MG/DL (ref 8.4–10.2)
CHLORIDE SERPL-SCNC: 104 MMOL/L (ref 98–107)
CO2 SERPL-SCNC: 32 MMOL/L (ref 23–31)
CREAT SERPL-MCNC: 0.78 MG/DL (ref 0.55–1.02)
EOSINOPHIL # BLD AUTO: 0.14 X10(3)/MCL (ref 0–0.9)
EOSINOPHIL NFR BLD AUTO: 1.9 %
ERYTHROCYTE [DISTWIDTH] IN BLOOD BY AUTOMATED COUNT: 12.9 % (ref 11.5–17)
GFR SERPLBLD CREATININE-BSD FMLA CKD-EPI: >60 MLS/MIN/1.73/M2
GLOBULIN SER-MCNC: 3.3 GM/DL (ref 2.4–3.5)
GLUCOSE SERPL-MCNC: 102 MG/DL (ref 82–115)
HCT VFR BLD AUTO: 41 % (ref 37–47)
HGB BLD-MCNC: 13 G/DL (ref 12–16)
IMM GRANULOCYTES # BLD AUTO: 0.02 X10(3)/MCL (ref 0–0.04)
IMM GRANULOCYTES NFR BLD AUTO: 0.3 %
LYMPHOCYTES # BLD AUTO: 2.36 X10(3)/MCL (ref 0.6–4.6)
LYMPHOCYTES NFR BLD AUTO: 32.4 %
MCH RBC QN AUTO: 30.4 PG (ref 27–31)
MCHC RBC AUTO-ENTMCNC: 31.7 G/DL (ref 33–36)
MCV RBC AUTO: 96 FL (ref 80–94)
MONOCYTES # BLD AUTO: 0.63 X10(3)/MCL (ref 0.1–1.3)
MONOCYTES NFR BLD AUTO: 8.7 %
NEUTROPHILS # BLD AUTO: 4.08 X10(3)/MCL (ref 2.1–9.2)
NEUTROPHILS NFR BLD AUTO: 56 %
PLATELET # BLD AUTO: 202 X10(3)/MCL (ref 130–400)
PMV BLD AUTO: 10.4 FL (ref 7.4–10.4)
POTASSIUM SERPL-SCNC: 3.8 MMOL/L (ref 3.5–5.1)
PROT SERPL-MCNC: 7.5 GM/DL (ref 5.8–7.6)
RBC # BLD AUTO: 4.27 X10(6)/MCL (ref 4.2–5.4)
SODIUM SERPL-SCNC: 144 MMOL/L (ref 136–145)
TSH SERPL-ACNC: 1.67 UIU/ML (ref 0.35–4.94)
WBC # SPEC AUTO: 7.3 X10(3)/MCL (ref 4.5–11.5)

## 2023-03-24 PROCEDURE — 86300 IMMUNOASSAY TUMOR CA 15-3: CPT | Mod: 90

## 2023-03-24 PROCEDURE — 85025 COMPLETE CBC W/AUTO DIFF WBC: CPT

## 2023-03-24 PROCEDURE — 36415 COLL VENOUS BLD VENIPUNCTURE: CPT

## 2023-03-24 PROCEDURE — 80053 COMPREHEN METABOLIC PANEL: CPT

## 2023-03-24 PROCEDURE — 84443 ASSAY THYROID STIM HORMONE: CPT

## 2023-03-24 NOTE — PROGRESS NOTES
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 2022 BL SC MG at [facility]-which revealed on R MG lower inner quadrant anterior depth, there are two focal asymmetries. On L MG central region posterior depth there are indeterminate calcifications. At 12 o'clock left breast middle depth, there is a focal asymmetry with calcifications. There are stable post-therapy changes of the left breast. No detrimental change at the lumpectomy bed. There are stable post-core needle biopsy changes and a jesús shaped clip in the right breast. No detrimental change at this biopsy site. No other significant masses, calcifications, or other findings are seen in either breast. BIRADS-0 ; additional imaging needed.    2. 2022 BL DG MG/ BL US BREAST LIMITED at [facility]- which revealed on R MG at 4 o'clock subareolar anterior there is a 0.7 cm oval, circumscribed mass. On L MG 12 o'clock, anterior depth, there is a 1.2 cm focal asymmetry with associated heterogeneous calcifications. These findings correspond to the areas recalled on the screening examination dated 2022. No other suspicious masses, calcifications, or other signs of malignancy are  identified. On R US, at 3 o'clock subareolar there is a 0.6 x 0.4 x 0.4 cm oval, hypoechoic mass with indistinct margins. At 4 o'clock subareolar right breast, there is a 0.7 x 0.3 x 0.6 cm benign, simple cyst. These correspond to the mammographic findings in these regions. No other suspicious sonographic abnormality is seen. Specifically, no suspicious sonographic correlate is seen for the focal asymmetry in the 12 o'clock left breast. Benign-appearing lymph nodes are noted in the bilateral axillae. BIRADS-4 suspicious; biopsy recommended.    Bone density 9/21/22:   LUMBAR SPINE The L1 to L3 vertebral bone mineral density is equal to 1.121 g/cm squared with a T score of -0.5.  LEFT HIP The left femoral neck bone mineral density is equal to 0.744 g/cm squared with a T score of -2.1.   The left total hip bone mineral density is equal to 0.816 g/cm squared with a T score of -1.5.   RIGHT HIP The right femoral neck bone mineral density is equal to 0.694 g/cm squared with a T score of -2.5.  The right total hip bone mineral density is equal to 0.824 g/cm squared with a T score of -1.5.  FRAX 10-YEAR PROBABILITY OF FRACTURE: 21.4% risk of a major osteoporotic fracture and 2.9% risk of hip fracture in the next 10 years    Impression: Osteoporosis      Pathology:  3/16/2022:   [1] LEFT BREAST POSTERIOR DEPTH AMORPHOUS GROUPED CALCIFICATIONS CENTRAL REGION: DUCTAL CARCINOMA IN SITU, LOW GRADE CRIBRIFORM/MICROPAPILLARY TYPE, WITH MICROCALCIFICATIONS. DCIS is present on two core biopsies, the largest focus measuring less than 3 mm.   [2] LEFT BREAST 12 O' CLOCK ANTERIOR DEPTH FOCAL ASYMMETRY WITH HETEROGENOUS CALCIFICATIONS:SCLEROTIC FIBROADENOMATOID NODULE WITH DYSTROPIC CALCIFICATION.   [3] RIGHT BREAST 3 O' CLOCK SUBAREOLAR MASS: INVASIVE DUCTAL CARCINOMA, SOTO-ROMERO GRADE 1.  Carcinoma is present on two core biopsies and measures 5 mm.    %, %, Her2 neg, Ki67 58%.    4/25/2022:  [1]  BREAST, LEFT, SIMPLE  MASTECTOMY: DIMINUTIVE FOCUS OF RESIDUAL LOW GRADE DUCTAL CARCINOMA IN SITU.  [2]  BREAST, RIGHT, SIMPLE MASTECTOMY: INVASIVE DUCTAL CARCINOMA, LOW GRADE, GRADE 1(OF 3).  -Tumor size:  4.0 mm. pT Category:  pT1a  pN0  BREAST, RIGHT, AXILLARY SENTINEL NODE #4/4: NEGATIVE FOR METASTATIC CARCINOMA.  The patient's previous history of low grade invasive ductal carcinoma is noted from surgical pathology report [id number] ER (100%), WA (100%), HER2 negative, Ki-67 high (58%).              CLINICAL HISTORY:       Patient: Riana Pastrana is a 67 y.o. female kindly referred for history of breast cancer.  I do not have record of her diagnosis and treatment of her original breast cancer.    Patient states that she was diagnosed with breast cancer in 2005.  She had Left lumpectomy done, with no chemotherapy or radiation. She took tamoxifen for 5 years. She reports that she was treated by Dr. Robbin Kenny. She says that she was seen at [facility], but since her insurance has changed she would like to establish care here.     Patient reports menarche at 13. She had 3 pregnancies, 2 live children, 1 miscarriage. Her first child was at age 20.She had a partial hysterectomy at age 29. She admits to oral contraceptives for about 3 years. No hormone replacement therapy.     Her sister had ovarian cancer diagnosed in her 50's, currently still alive at age 80.    She reports that she had a mass in her stomach and it was removed in 2017 along with her ovaries at [facility].  Again I do not have the records, but imaging studies none here include CT scan of the abdomen and pelvis done on 5/21/2017 for an abdominal distention showed a 22 x 21 x 16 cm complex septated peripherally enhancing mass which appears to arise from the right adnexa, presumably ovarian in origin.  Uterus not identified.  Cystic mass in the abdomen and pelvis displaces the urinary bladder inferiorly.  Liver is is heterogeneous in attenuation and contains  2 low-attenuation masses in the dome, too small to accurately characterize by CT.  Spleen normal in size. CT of the chest showed cardiomegaly with moderate pericardial effusion.  Bilateral lower lobe consolidation with atelectasis and bilateral pleural effusions, left greater than right. Large abdominal cystic mass of unknown etiology. Further assessment with dedicated imaging of the abdomen and pelvis recommended. US pelvis 5/22/2021:  A large, multilocular mass consistent with the CT findings is identified measuring 27.5 x 20.1 x 19.6 cm. There are multiple septations or separate fluid loculations in the large cystic mass. Low level internal echogenicity is also visible in the cyst fluid with dependent debris also identified. No free pelvic fluid is visible. This lesion is suspicious for neoplasm of pelvic and likely ovarian origin. The uterus is not identified and surgically absent by patient history. 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  Ascites  suggestive of peritoneal implants,  the ascites is new from the previous study. No Path available    On 2/7/2022 screening mammogram: INCOMPLETE: NEEDS ADDITIONAL IMAGING EVALUATION  Right breast focal asymmetries, left breast calcifications, and left breast focal asymmetry with calcifications need further evaluation. BI-RADS 0: Incomplete. Need additional imaging evaluation.     On 2/24/2022 Diagnostic right mammogram and Breast US: SUSPICIOUS OF MALIGNANCY  1. Oval, hypoechoic mass with indistinct margins in the 3:00 subareolar right breast is suspicious. Right breast ultrasound-guided biopsy is recommended.  2. Amorphous, grouped calcifications in the central left breast, posterior depth, are suspicious. Left breast stereotactic guided biopsy is recommended.  3. Focal asymmetry with associated heterogeneous calcifications in the 12:00 left breast, anterior depth, is suspicious. Left breast stereotactic guided  biopsy is recommended.     On 3/16/2022 she is schedule for breast bx. otherwise she is doing well and voices no concerns.  No fever, chills, sweats.  No chest pain or shortness of breath.  No changes in bowel habits.  No abdominal or pelvic pain.  No neurological symptoms.    [1] LEFT BREAST POSTERIOR DEPTH AMORPHOUS GROUPED CALCIFICATIONS CENTRAL REGION:  DUCTAL CARCINOMA IN SITU, LOW GRADE CRIBRIFORM/MICROPAPILLARY TYPE, WITH MICROCALCIFICATIONS.  COMMENT: DCIS is present on two core biopsies, the largest focus measuring less than 3 mm. The results of ER/DC analysis will be the subject of an addendum report.  [2] LEFT BREAST 12 O' CLOCK ANTERIOR DEPTH FOCAL ASYMMETRY WITH HETEROGENOUS CALCIFICATIONS:  SCLEROTIC FIBROADENOMATOID NODULE WITH DYSTROPIC CALCIFICATION.  [3] RIGHT BREAST 3 O' CLOCK SUBAREOLAR MASS:  INVASIVE DUCTAL CARCINOMA, SOTO-ROMERO GRADE 1.  COMMENT: Carcinoma is present on two core biopsies and measures 5 mm.    %, %, Her2 neg, Ki67 58%.      Oncotype showed a recurrence score of 18 with a distant recurrent risk at 9 years of 5% with AI or tamoxifen alone and <1% absolute chemotherapy benefit.     Chief Complaint: no complaints today      Interval History:    She underwent bilateral mastectomies 4/25/2022. Overall,  she has been doing well. She denies any fever, chills, sweats. No chest pain or shortness of breath.  No changes in bowel habits.   She was started on aromatase inhibitors in March 2022. Tolerating well. She started Prolia in 10/2022. Next due on 4/17/23- scheduled. She has no complaints today. Takes calcium + vitamin D. Calcium level mildly elevated.       Past Medical History:   Diagnosis Date    History of bilateral breast cancer     left in 2007; bilateral in 2022    Hyperlipidemia 5/13/2022    Hypertension     Hypothyroidism 5/13/2022    Severe obesity (BMI 35.0-39.9) with comorbidity 5/13/2022      Past Surgical History:   Procedure Laterality Date    BREAST  "BIOPSY      BREAST SURGERY      HYSTERECTOMY      TONSILLECTOMY       Family History   Problem Relation Age of Onset    Ovarian cancer Sister 50     Social Connections: Not on file       Review of patient's allergies indicates:   Allergen Reactions    Penicillins Other (See Comments)     UNKNOWN REACTION. ALLERGY SINCE CHILDHOOD        Current Outpatient Medications on File Prior to Visit   Medication Sig Dispense Refill    atorvastatin (LIPITOR) 20 MG tablet Take 1 tablet (20 mg total) by mouth once daily. 90 tablet 2    cholecalciferol, vitamin D3, 100 mcg (4,000 unit) Cap capsule Take 400 Units by mouth once daily. 2 po daily      denosumab (PROLIA) 60 mg/mL Syrg Inject 60 mg into the skin.      furosemide (LASIX) 20 MG tablet TAKE 1 TABLET EVERY DAY AS DIRECTED 90 tablet 0    hydroCHLOROthiazide (HYDRODIURIL) 25 MG tablet TAKE 1 TABLET EVERY DAY 90 tablet 2    letrozole (FEMARA) 2.5 mg Tab Take 1 tablet by mouth once daily 30 tablet 2    levothyroxine (SYNTHROID) 50 MCG tablet TAKE 1 TABLET BY MOUTH BEFORE BREAKFAST. 90 tablet 1    metoprolol tartrate (LOPRESSOR) 25 MG tablet TAKE 1 TABLET TWICE DAILY 180 tablet 1    multivit-min/iron/folic/lutein (CENTRUM SILVER WOMEN ORAL) Take 1 tablet by mouth once daily.      potassium chloride (MICRO-K) 10 MEQ CpSR Take 1 capsule (10 mEq total) by mouth once daily. 90 capsule 1     No current facility-administered medications on file prior to visit.      Review of Systems   Constitutional:  Negative for chills and fever.   Respiratory:  Negative for chest tightness and shortness of breath.    Gastrointestinal:  Negative for abdominal pain.   Neurological:  Negative for headaches.            Vitals:    03/27/23 1021   BP: 139/69   BP Location: Right arm   Patient Position: Sitting   BP Method: Large (Automatic)   Pulse: 63   Resp: 20   Temp: 97.9 °F (36.6 °C)   TempSrc: Oral   SpO2: 96%   Weight: 100.2 kg (221 lb)   Height: 5' 4" (1.626 m)        Physical Exam  Vitals and " nursing note reviewed.   Constitutional:       General: She is not in acute distress.     Appearance: She is well-developed. She is obese.   HENT:      Head: Normocephalic and atraumatic.      Mouth/Throat:      Mouth: Mucous membranes are moist.   Eyes:      General: No scleral icterus.     Extraocular Movements: Extraocular movements intact.      Conjunctiva/sclera: Conjunctivae normal.      Pupils: Pupils are equal, round, and reactive to light.   Neck:      Vascular: No JVD.   Cardiovascular:      Rate and Rhythm: Normal rate. Rhythm irregularly irregular.      Heart sounds: No murmur heard.  Pulmonary:      Effort: Pulmonary effort is normal.      Breath sounds: Normal breath sounds. No wheezing or rhonchi.   Chest:      Chest wall: No deformity or tenderness.   Breasts:     Right: Absent. No swelling, mass, skin change or tenderness.      Left: Absent. No swelling, mass, skin change or tenderness.   Abdominal:      General: Bowel sounds are normal. There is no distension.      Palpations: Abdomen is soft. There is no mass.      Tenderness: There is no abdominal tenderness.   Musculoskeletal:         General: No swelling or deformity.      Cervical back: Neck supple.   Lymphadenopathy:      Cervical: No cervical adenopathy.      Upper Body:      Right upper body: No supraclavicular or axillary adenopathy.      Left upper body: No supraclavicular or axillary adenopathy.      Lower Body: No right inguinal adenopathy. No left inguinal adenopathy.   Skin:     General: Skin is warm.      Coloration: Skin is not jaundiced.      Findings: No lesion or rash.      Nails: There is no clubbing.   Neurological:      General: No focal deficit present.      Mental Status: She is alert and oriented to person, place, and time.      Sensory: Sensation is intact.      Motor: Motor function is intact.      Gait: Gait is intact.   Psychiatric:         Attention and Perception: Attention normal.         Mood and Affect: Mood and  affect normal.         Speech: Speech normal.         Behavior: Behavior is cooperative.         Thought Content: Thought content normal.         Cognition and Memory: Cognition normal.         Judgment: Judgment normal.     ECOG SCORE             Laboratory:   Latest Reference Range & Units 05/10/22 10:54   WBC 4.5 - 11.5 x10(3)/mcL 6.0   RBC 4.20 - 5.40 x10(6)/mcL 4.08 (L)   Hemoglobin 12.0 - 16.0 gm/dL 12.6   Hematocrit 37.0 - 47.0 % 38.2   MCV 80.0 - 94.0 fL 93.6   MCH 27.0 - 31.0 pg 30.9   MCHC 33.0 - 36.0 mg/dL 33.0   RDW 11.5 - 17.0 % 12.5   Platelets 130 - 400 x10(3)/mcL 255   (L): Data is abnormally low         Assessment:       1. Malignant neoplasm of upper-inner quadrant of right breast in female, estrogen receptor positive    2. Use of letrozole (Femara)    3. Osteoporosis, unspecified osteoporosis type, unspecified pathological fracture presence          1) Left breast cancer-diagnosed 2005   --lumpectomy/SLNB, XRT, and 5 years of Tamoxifen   2) Mucinous cystosarcoma-Dx 2017    --SP BSO   3) Bilateral breast cancer   --DCIS-clinical anatomic stage IA / prognostic stage IA (cT1b cN0 M0) left breast Dx 3/16/2022   --Clinical anatomic stage IA / prognostic stage IA (cT1b cN0 M0) Right breast    --%, %, Her 2 neg, Ki 67 58%, Grade 1   --Bilateral mastectomies 4/25/2022, Right SLND      Plan:         Started Femara 3/2022    Baseline DEXA scan with osteoporosis --right femoral neck bone mineral density with a T score of -2.5. Otherwise osteopenia  She will cont Femara for now and re eval next Bone density (due 9/2024) . If bone mass worsen then with change to Tamoxifen  Prolia started in 10/2022- Next due in 4/2023.   Cont Ca and vit D supplements and weight bearing exercise  No breast imaging as she has bilateral mastectomies  Return to clinic in 3 months with labs: CBC, CMP, CA 27-29    Encouraged to call with questions or problems  The patient was given ample opportunity to ask questions and  they were all answered to satisfaction; patient demonstrated understanding of what we discussed and is agreeable to the plan.    BECKA RobertP

## 2023-03-27 ENCOUNTER — OFFICE VISIT (OUTPATIENT)
Dept: INTERNAL MEDICINE | Facility: CLINIC | Age: 68
End: 2023-03-27
Payer: MEDICARE

## 2023-03-27 ENCOUNTER — TELEPHONE (OUTPATIENT)
Dept: HEMATOLOGY/ONCOLOGY | Facility: CLINIC | Age: 68
End: 2023-03-27

## 2023-03-27 ENCOUNTER — OFFICE VISIT (OUTPATIENT)
Dept: HEMATOLOGY/ONCOLOGY | Facility: CLINIC | Age: 68
End: 2023-03-27
Payer: MEDICARE

## 2023-03-27 VITALS
BODY MASS INDEX: 37.73 KG/M2 | TEMPERATURE: 98 F | OXYGEN SATURATION: 96 % | WEIGHT: 221 LBS | HEIGHT: 64 IN | HEART RATE: 63 BPM | RESPIRATION RATE: 20 BRPM | DIASTOLIC BLOOD PRESSURE: 69 MMHG | SYSTOLIC BLOOD PRESSURE: 139 MMHG

## 2023-03-27 VITALS
TEMPERATURE: 98 F | DIASTOLIC BLOOD PRESSURE: 88 MMHG | RESPIRATION RATE: 18 BRPM | OXYGEN SATURATION: 96 % | BODY MASS INDEX: 37.93 KG/M2 | WEIGHT: 221 LBS | HEART RATE: 73 BPM | SYSTOLIC BLOOD PRESSURE: 128 MMHG

## 2023-03-27 DIAGNOSIS — E78.2 MIXED HYPERLIPIDEMIA: Chronic | ICD-10-CM

## 2023-03-27 DIAGNOSIS — I10 PRIMARY HYPERTENSION: Chronic | ICD-10-CM

## 2023-03-27 DIAGNOSIS — E03.9 HYPOTHYROIDISM, UNSPECIFIED TYPE: Chronic | ICD-10-CM

## 2023-03-27 DIAGNOSIS — Z17.0 MALIGNANT NEOPLASM OF UPPER-INNER QUADRANT OF RIGHT BREAST IN FEMALE, ESTROGEN RECEPTOR POSITIVE: Primary | ICD-10-CM

## 2023-03-27 DIAGNOSIS — E83.52 HYPERCALCEMIA: Primary | ICD-10-CM

## 2023-03-27 DIAGNOSIS — C50.211 MALIGNANT NEOPLASM OF UPPER-INNER QUADRANT OF RIGHT BREAST IN FEMALE, ESTROGEN RECEPTOR POSITIVE: Primary | ICD-10-CM

## 2023-03-27 DIAGNOSIS — M81.0 OSTEOPOROSIS, UNSPECIFIED OSTEOPOROSIS TYPE, UNSPECIFIED PATHOLOGICAL FRACTURE PRESENCE: ICD-10-CM

## 2023-03-27 DIAGNOSIS — Z79.811 USE OF LETROZOLE (FEMARA): ICD-10-CM

## 2023-03-27 PROBLEM — E87.6 HYPOKALEMIA: Status: RESOLVED | Noted: 2022-11-23 | Resolved: 2023-03-27

## 2023-03-27 LAB — CANCER AG27-29 SERPL-ACNC: 20 U/ML

## 2023-03-27 PROCEDURE — 1126F AMNT PAIN NOTED NONE PRSNT: CPT | Mod: CPTII,S$GLB,, | Performed by: NURSE PRACTITIONER

## 2023-03-27 PROCEDURE — 3079F DIAST BP 80-89 MM HG: CPT | Mod: CPTII,,,

## 2023-03-27 PROCEDURE — 3288F PR FALLS RISK ASSESSMENT DOCUMENTED: ICD-10-PCS | Mod: CPTII,S$GLB,, | Performed by: NURSE PRACTITIONER

## 2023-03-27 PROCEDURE — 3078F DIAST BP <80 MM HG: CPT | Mod: CPTII,S$GLB,, | Performed by: NURSE PRACTITIONER

## 2023-03-27 PROCEDURE — 99999 PR PBB SHADOW E&M-EST. PATIENT-LVL IV: ICD-10-PCS | Mod: PBBFAC,,, | Performed by: NURSE PRACTITIONER

## 2023-03-27 PROCEDURE — 1160F PR REVIEW ALL MEDS BY PRESCRIBER/CLIN PHARMACIST DOCUMENTED: ICD-10-PCS | Mod: CPTII,,,

## 2023-03-27 PROCEDURE — 3008F PR BODY MASS INDEX (BMI) DOCUMENTED: ICD-10-PCS | Mod: CPTII,,,

## 2023-03-27 PROCEDURE — 3008F BODY MASS INDEX DOCD: CPT | Mod: CPTII,S$GLB,, | Performed by: NURSE PRACTITIONER

## 2023-03-27 PROCEDURE — 3079F PR MOST RECENT DIASTOLIC BLOOD PRESSURE 80-89 MM HG: ICD-10-PCS | Mod: CPTII,,,

## 2023-03-27 PROCEDURE — 1160F PR REVIEW ALL MEDS BY PRESCRIBER/CLIN PHARMACIST DOCUMENTED: ICD-10-PCS | Mod: CPTII,S$GLB,, | Performed by: NURSE PRACTITIONER

## 2023-03-27 PROCEDURE — 3288F FALL RISK ASSESSMENT DOCD: CPT | Mod: CPTII,,,

## 2023-03-27 PROCEDURE — 1159F MED LIST DOCD IN RCRD: CPT | Mod: CPTII,S$GLB,, | Performed by: NURSE PRACTITIONER

## 2023-03-27 PROCEDURE — 1126F PR PAIN SEVERITY QUANTIFIED, NO PAIN PRESENT: ICD-10-PCS | Mod: CPTII,,,

## 2023-03-27 PROCEDURE — 1101F PT FALLS ASSESS-DOCD LE1/YR: CPT | Mod: CPTII,S$GLB,, | Performed by: NURSE PRACTITIONER

## 2023-03-27 PROCEDURE — 3075F PR MOST RECENT SYSTOLIC BLOOD PRESS GE 130-139MM HG: ICD-10-PCS | Mod: CPTII,S$GLB,, | Performed by: NURSE PRACTITIONER

## 2023-03-27 PROCEDURE — 1160F RVW MEDS BY RX/DR IN RCRD: CPT | Mod: CPTII,,,

## 2023-03-27 PROCEDURE — 1159F MED LIST DOCD IN RCRD: CPT | Mod: CPTII,,,

## 2023-03-27 PROCEDURE — 3008F PR BODY MASS INDEX (BMI) DOCUMENTED: ICD-10-PCS | Mod: CPTII,S$GLB,, | Performed by: NURSE PRACTITIONER

## 2023-03-27 PROCEDURE — 99214 PR OFFICE/OUTPT VISIT, EST, LEVL IV, 30-39 MIN: ICD-10-PCS | Mod: ,,,

## 2023-03-27 PROCEDURE — 99214 PR OFFICE/OUTPT VISIT, EST, LEVL IV, 30-39 MIN: ICD-10-PCS | Mod: S$GLB,,, | Performed by: NURSE PRACTITIONER

## 2023-03-27 PROCEDURE — 3288F FALL RISK ASSESSMENT DOCD: CPT | Mod: CPTII,S$GLB,, | Performed by: NURSE PRACTITIONER

## 2023-03-27 PROCEDURE — 1101F PR PT FALLS ASSESS DOC 0-1 FALLS W/OUT INJ PAST YR: ICD-10-PCS | Mod: CPTII,,,

## 2023-03-27 PROCEDURE — 3074F SYST BP LT 130 MM HG: CPT | Mod: CPTII,,,

## 2023-03-27 PROCEDURE — 1159F PR MEDICATION LIST DOCUMENTED IN MEDICAL RECORD: ICD-10-PCS | Mod: CPTII,S$GLB,, | Performed by: NURSE PRACTITIONER

## 2023-03-27 PROCEDURE — 1126F PR PAIN SEVERITY QUANTIFIED, NO PAIN PRESENT: ICD-10-PCS | Mod: CPTII,S$GLB,, | Performed by: NURSE PRACTITIONER

## 2023-03-27 PROCEDURE — 1126F AMNT PAIN NOTED NONE PRSNT: CPT | Mod: CPTII,,,

## 2023-03-27 PROCEDURE — 99214 OFFICE O/P EST MOD 30 MIN: CPT | Mod: S$GLB,,, | Performed by: NURSE PRACTITIONER

## 2023-03-27 PROCEDURE — 99999 PR PBB SHADOW E&M-EST. PATIENT-LVL IV: CPT | Mod: PBBFAC,,, | Performed by: NURSE PRACTITIONER

## 2023-03-27 PROCEDURE — 3074F PR MOST RECENT SYSTOLIC BLOOD PRESSURE < 130 MM HG: ICD-10-PCS | Mod: CPTII,,,

## 2023-03-27 PROCEDURE — 1101F PR PT FALLS ASSESS DOC 0-1 FALLS W/OUT INJ PAST YR: ICD-10-PCS | Mod: CPTII,S$GLB,, | Performed by: NURSE PRACTITIONER

## 2023-03-27 PROCEDURE — 1160F RVW MEDS BY RX/DR IN RCRD: CPT | Mod: CPTII,S$GLB,, | Performed by: NURSE PRACTITIONER

## 2023-03-27 PROCEDURE — 99214 OFFICE O/P EST MOD 30 MIN: CPT | Mod: ,,,

## 2023-03-27 PROCEDURE — 3288F PR FALLS RISK ASSESSMENT DOCUMENTED: ICD-10-PCS | Mod: CPTII,,,

## 2023-03-27 PROCEDURE — 3078F PR MOST RECENT DIASTOLIC BLOOD PRESSURE < 80 MM HG: ICD-10-PCS | Mod: CPTII,S$GLB,, | Performed by: NURSE PRACTITIONER

## 2023-03-27 PROCEDURE — 3075F SYST BP GE 130 - 139MM HG: CPT | Mod: CPTII,S$GLB,, | Performed by: NURSE PRACTITIONER

## 2023-03-27 PROCEDURE — 1159F PR MEDICATION LIST DOCUMENTED IN MEDICAL RECORD: ICD-10-PCS | Mod: CPTII,,,

## 2023-03-27 PROCEDURE — 1101F PT FALLS ASSESS-DOCD LE1/YR: CPT | Mod: CPTII,,,

## 2023-03-27 PROCEDURE — 3008F BODY MASS INDEX DOCD: CPT | Mod: CPTII,,,

## 2023-03-27 RX ORDER — MELOXICAM 7.5 MG/1
1 TABLET ORAL DAILY PRN
COMMUNITY
Start: 2023-03-01 | End: 2023-07-20

## 2023-03-27 NOTE — ASSESSMENT & PLAN NOTE
Lab Results   Component Value Date    TSH 1.672 03/24/2023   -stable currently on levothyroxine 50 mcg daily, continue   -TSH for next visit

## 2023-03-27 NOTE — PROGRESS NOTES
Patient ID: Riana Pastrana is a 67 y.o. female.    Chief Complaint: thyroid  (Thyroid follow up )    67-year-old female here today for 6 month follow-up visit.  Medical comorbidities include the PAF, HTN, HLD, and breast cancer.  Also with history of left breast cancer s/p  lumpectomy/SLNB, radiation, and Tamoxifen. Now s/p bilateral mastectomy (04/25/2022) for Malignant neoplasm of central portion of right female breast and Ductal carcinoma in situ left breast. Followed by oncology.  Since last visit patient reports has been fairly well without acute injury or major illness.  Most recent TSH level noted well controlled currently on levothyroxine 50 mcg.  Calcium noted elevated on 2 different intervals, for which we will repeat lab as well as add PTH and vitamin-D level.  Otherwise patient reports feeling generally well without acute issues or complaints.       MCW: 11/23/2022          MEDICAL HISTORY:    Past Medical History:   Diagnosis Date    History of bilateral breast cancer     left in 2007; bilateral in 2022    Hyperlipidemia 5/13/2022    Hypertension     Hypothyroidism 5/13/2022    Severe obesity (BMI 35.0-39.9) with comorbidity 5/13/2022      Past Surgical History:   Procedure Laterality Date    BREAST BIOPSY      BREAST SURGERY      HYSTERECTOMY      TONSILLECTOMY        Social History     Tobacco Use    Smoking status: Never    Smokeless tobacco: Never   Substance Use Topics    Alcohol use: Never    Drug use: Never          Health Maintenance Due   Topic Date Due    Hepatitis C Screening  Never done    TETANUS VACCINE  Never done    Shingles Vaccine (1 of 2) Never done          Patient Care Team:  Chely Cameron MD as PCP - General (Internal Medicine)  Judi Richards MD as Consulting Physician (Hematology and Oncology)      Review of Systems   Constitutional:  Negative for fatigue and fever.   HENT:  Negative for congestion, rhinorrhea, sore throat and trouble swallowing.    Eyes:   Negative for redness and visual disturbance.   Respiratory:  Negative for cough, chest tightness and shortness of breath.    Cardiovascular:  Negative for chest pain and palpitations.   Gastrointestinal:  Negative for abdominal pain, constipation, diarrhea, nausea and vomiting.   Genitourinary:  Negative for dysuria, flank pain, frequency and urgency.   Musculoskeletal:  Negative for arthralgias, gait problem and myalgias.   Skin:  Negative for rash and wound.   Neurological:  Negative for facial asymmetry, speech difficulty, weakness and headaches.   All other systems reviewed and are negative.    Objective:   /88 (BP Location: Right arm, Patient Position: Sitting, BP Method: Large (Automatic))   Pulse 73   Temp 97.6 °F (36.4 °C) (Temporal)   Resp 18   Wt 100.2 kg (221 lb)   SpO2 96%   BMI 37.93 kg/m²      Physical Exam  Constitutional:       General: She is not in acute distress.     Appearance: Normal appearance.   HENT:      Right Ear: Tympanic membrane, ear canal and external ear normal.      Left Ear: Tympanic membrane, ear canal and external ear normal.      Nose: Nose normal.      Mouth/Throat:      Mouth: Mucous membranes are moist.      Pharynx: Oropharynx is clear.   Eyes:      Extraocular Movements: Extraocular movements intact.      Conjunctiva/sclera: Conjunctivae normal.      Pupils: Pupils are equal, round, and reactive to light.   Cardiovascular:      Rate and Rhythm: Normal rate and regular rhythm.      Pulses: Normal pulses.      Heart sounds: Normal heart sounds. No murmur heard.    No gallop.   Pulmonary:      Effort: Pulmonary effort is normal.      Breath sounds: Normal breath sounds. No wheezing.   Abdominal:      General: Bowel sounds are normal. There is no distension.      Palpations: Abdomen is soft. There is no mass.      Tenderness: There is no abdominal tenderness. There is no guarding.   Musculoskeletal:         General: Normal range of motion.   Skin:     General: Skin is  warm and dry.   Neurological:      Mental Status: She is alert. Mental status is at baseline.      Sensory: No sensory deficit.      Motor: No weakness.         Assessment:       ICD-10-CM ICD-9-CM   1. Hypercalcemia  E83.52 275.42   2. Primary hypertension  I10 401.9   3. Mixed hyperlipidemia  E78.2 272.2   4. Hypothyroidism, unspecified type  E03.9 244.9        Plan:     Problem List Items Addressed This Visit          Cardiac/Vascular    Hypertension (Chronic)     -stable   -currently on HCTZ, furosemide, metoprolol, continue   -low-sodium diet         Hyperlipidemia (Chronic)     -currently on atorvastatin, continue  -low-cholesterol diet            Renal/    Hypercalcemia - Primary     -persistent elevation, repeat calcium level   -add PTH and vitamin-D level as well         Relevant Orders    PTH, Intact    Vitamin D    Calcium       Endocrine    Hypothyroidism (Chronic)     Lab Results   Component Value Date    TSH 1.672 03/24/2023   -stable currently on levothyroxine 50 mcg daily, continue   -TSH for next visit                 Follow up in about 4 months (around 7/27/2023) for Thyroid, with labs prior.   -plan specifics discussed above    Orders Placed This Encounter    PTH, Intact    Vitamin D    Calcium        Medication List with Changes/Refills   Current Medications    ATORVASTATIN (LIPITOR) 20 MG TABLET    Take 1 tablet (20 mg total) by mouth once daily.    CHOLECALCIFEROL, VITAMIN D3, 100 MCG (4,000 UNIT) CAP CAPSULE    Take 400 Units by mouth once daily. 2 po daily    DENOSUMAB (PROLIA) 60 MG/ML SYRG    Inject 60 mg into the skin.    FUROSEMIDE (LASIX) 20 MG TABLET    TAKE 1 TABLET EVERY DAY AS DIRECTED    HYDROCHLOROTHIAZIDE (HYDRODIURIL) 25 MG TABLET    TAKE 1 TABLET EVERY DAY    LETROZOLE (FEMARA) 2.5 MG TAB    Take 1 tablet by mouth once daily    LEVOTHYROXINE (SYNTHROID) 50 MCG TABLET    TAKE 1 TABLET BY MOUTH BEFORE BREAKFAST.    MELOXICAM (MOBIC) 7.5 MG TABLET    Take 1 tablet by mouth daily  as needed.    METOPROLOL TARTRATE (LOPRESSOR) 25 MG TABLET    TAKE 1 TABLET TWICE DAILY    MULTIVIT-MIN/IRON/FOLIC/LUTEIN (CENTRUM SILVER WOMEN ORAL)    Take 1 tablet by mouth once daily.    POTASSIUM CHLORIDE (MICRO-K) 10 MEQ CPSR    Take 1 capsule (10 mEq total) by mouth once daily.

## 2023-03-28 DIAGNOSIS — D05.12 DUCTAL CARCINOMA IN SITU (DCIS) OF LEFT BREAST: ICD-10-CM

## 2023-03-28 DIAGNOSIS — C50.211 MALIGNANT NEOPLASM OF UPPER-INNER QUADRANT OF RIGHT BREAST IN FEMALE, ESTROGEN RECEPTOR POSITIVE: ICD-10-CM

## 2023-03-28 DIAGNOSIS — Z17.0 MALIGNANT NEOPLASM OF UPPER-INNER QUADRANT OF RIGHT BREAST IN FEMALE, ESTROGEN RECEPTOR POSITIVE: ICD-10-CM

## 2023-03-28 RX ORDER — LETROZOLE 2.5 MG/1
2.5 TABLET, FILM COATED ORAL DAILY
Qty: 30 TABLET | Refills: 3 | Status: SHIPPED | OUTPATIENT
Start: 2023-03-28 | End: 2023-07-27

## 2023-03-31 NOTE — PROGRESS NOTES
Patient Care Team:  Chely Cameron MD as PCP - General (Internal Medicine)  Judi Richards MD as Consulting Physician (Hematology and Oncology)      Patient ID: Riana Pastrana is a 67 y.o. female.    Chief Complaint: Annual Exam    67-year-old female here today for a wellness visit.  Medical comorbidities include the PAF, HTN, HLD, osteoporosis, and breast cancer.  Also with history of left breast cancer s/p  lumpectomy/SLNB, radiation, and Tamoxifen. Now s/p bilateral mastectomy (04/25/2022) for Malignant neoplasm of central portion of right breast and Ductal carcinoma in situ left breast.Since last visit patient reports she has been fairly well with out any acute injuries or major illnesses. Wellness labs reviewed and essentially normal, did have mild hypokalemia 3.2. Does have a prescription for KCL 20 meq however sates not 100% compliant due to size of tablet. Discussed switching to capsule formulary to aid with compliance.  Age appropriate screenings up to date. Immunizations reviewed with patient agreeable to receive pneumococcal 23 today. Otherwise patient well without any acute complaints.      MCW:  today 11/23/22   CRS: 11/16/2018        MEDICAL HISTORY:    Past Medical History:   Diagnosis Date    History of bilateral breast cancer     left in 2007; bilateral in 2022    Hyperlipidemia 5/13/2022    Hypertension     Hypothyroidism 5/13/2022    Severe obesity (BMI 35.0-39.9) with comorbidity 5/13/2022      Past Surgical History:   Procedure Laterality Date    BREAST BIOPSY      BREAST SURGERY      HYSTERECTOMY      TONSILLECTOMY        Social History     Tobacco Use    Smoking status: Never    Smokeless tobacco: Never   Substance Use Topics    Alcohol use: Never    Drug use: Never          Health Maintenance Due   Topic Date Due    Hepatitis C Screening  Never done    TETANUS VACCINE  Never done    Shingles Vaccine (1 of 2) Never done        Review of Systems   Constitutional:  Negative  "for fatigue and fever.   HENT:  Negative for congestion, rhinorrhea, sore throat and trouble swallowing.    Eyes:  Negative for redness and visual disturbance.   Respiratory:  Negative for cough, chest tightness and shortness of breath.    Cardiovascular:  Negative for chest pain and palpitations.   Gastrointestinal:  Negative for abdominal pain, constipation, diarrhea, nausea and vomiting.   Genitourinary:  Negative for dysuria, flank pain, frequency and urgency.   Musculoskeletal:  Negative for arthralgias, gait problem and myalgias.   Skin:  Negative for rash and wound.   Neurological:  Negative for facial asymmetry, speech difficulty, weakness and headaches.   All other systems reviewed and are negative.      Patient Reported Health Risk Assessment         Objective:   BP (!) 111/59 (BP Location: Right arm, Patient Position: Sitting, BP Method: Large (Automatic))   Pulse 69   Temp 97.3 °F (36.3 °C)   Ht 5' 4" (1.626 m)   Wt 98.9 kg (218 lb)   SpO2 97%   BMI 37.42 kg/m²      Physical Exam  Constitutional:       General: She is not in acute distress.     Appearance: Normal appearance. She is obese.   HENT:      Right Ear: Tympanic membrane, ear canal and external ear normal.      Left Ear: Tympanic membrane, ear canal and external ear normal.      Nose: Nose normal.      Mouth/Throat:      Mouth: Mucous membranes are moist.      Pharynx: Oropharynx is clear.   Eyes:      Extraocular Movements: Extraocular movements intact.      Conjunctiva/sclera: Conjunctivae normal.      Pupils: Pupils are equal, round, and reactive to light.   Cardiovascular:      Rate and Rhythm: Normal rate and regular rhythm.      Pulses: Normal pulses.      Heart sounds: Normal heart sounds. No murmur heard.    No gallop.   Pulmonary:      Effort: Pulmonary effort is normal.      Breath sounds: Normal breath sounds. No wheezing.   Abdominal:      General: Bowel sounds are normal. There is no distension.      Palpations: Abdomen is " 100 soft. There is no mass.      Tenderness: There is no abdominal tenderness. There is no guarding.   Musculoskeletal:         General: Normal range of motion.   Skin:     General: Skin is warm and dry.   Neurological:      Mental Status: She is alert. Mental status is at baseline.      Sensory: No sensory deficit.      Motor: No weakness.           No flowsheet data found.  Fall Risk Assessment - Outpatient 11/23/2022 10/17/2022 6/20/2022 5/13/2022 5/12/2022 5/10/2022 5/4/2022   Mobility Status Ambulatory Ambulatory Ambulatory Ambulatory Ambulatory Ambulatory Ambulatory   Number of falls 0 0 0 0 0 0 0   Identified as fall risk 0 1 0 0 0 - 0   Wrist band refused - - - - - 1 -                Assessment:       ICD-10-CM ICD-9-CM   1. Medicare annual wellness visit, subsequent  Z00.00 V70.0   2. Hypokalemia  E87.6 276.8   3. Need for pneumococcal vaccination  Z23 V03.82   4. Primary hypertension  I10 401.9   5. Mixed hyperlipidemia  E78.2 272.2   6. Hypothyroidism, unspecified type  E03.9 244.9   7. Age-related osteoporosis without current pathological fracture  M81.0 733.01   8. Severe obesity (BMI 35.0-39.9) with comorbidity  E66.01 278.01         Plan:     Problem List Items Addressed This Visit          Cardiac/Vascular    Hypertension (Chronic)     -Stable  -currently on HCTZ, Furosimide, Metoprolol, continue  -low sodium diet         Hyperlipidemia (Chronic)     Lab Results   Component Value Date    CHOL 177 11/09/2021    CHOL 161 09/01/2020    CHOL 178 09/23/2019     Lab Results   Component Value Date    HDL 67 (H) 11/09/2021    HDL 66 (H) 09/01/2020    HDL 65 (H) 09/23/2019   No results found for: LDLCALC  Lab Results   Component Value Date    TRIG 83 11/09/2021    TRIG 106 09/01/2020    TRIG 113 09/23/2019   -Stable  -currently on atorvastatin, continue  -Low-cholesterol diet            Renal/    Hypokalemia     -likely s/t to use of HCTZ and furosimide  -Does have KCL 20 meq however reports compliance issues  due to large size of tablet, Switch to capsule formulary to ameliorate          Relevant Medications    potassium chloride (MICRO-K) 10 MEQ CpSR    Other Relevant Orders    Comprehensive Metabolic Panel       Endocrine    Hypothyroidism (Chronic)     Lab Results   Component Value Date    TSH 1.6926 05/13/2022   -stable  -Currently on levothyroxine 50 mcg daily, continue           Relevant Orders    TSH    Severe obesity (BMI 35.0-39.9) with comorbidity (Chronic)     -BMI 37.42  -Low calorie Low Carb Diet  -Encourage some form of routine aerobic exercise for weight loss             Orthopedic    Osteoporosis (Chronic)     -Currently on prolia injections, continue  -Also on Femara currentlly            Other    Medicare annual wellness visit, subsequent - Primary     -patient feeling generally well today  -Wellness labs reviewed and stable, hypokalemia  -age-appropriate screenings up-to-date  -immunizations reviewed , PNA 23 today  -encourage routine aerobic exercise 2 to 3 times a week  -increase fluid hydration            Other Visit Diagnoses       Need for pneumococcal vaccination        Relevant Orders    (In Office Administered) Pneumococcal Polysaccharide Vaccine (23 Valent) (SQ/IM) (Completed)            Advance Care Planning   I attest to discussing Advance Care Planning with patient and/or family member.  Education was provided including the importance of the Health Care Power of , Advance Directives, and/or [place] documentation.  The patient expressed understanding to the importance of this information and discussion.     Opioid Screening: Patient medication list reviewed, patient is not taking prescription opioids. Patient is not using additional opioids than prescribed. Patient is at low risk of substance abuse based on this opioid use history.         Follow up in about 4 months (around 3/23/2023) for Thyroid and 1 Year, Wellness with labs prior to visit.   -plan specifics discussed  above    Orders Placed This Encounter    (In Office Administered) Pneumococcal Polysaccharide Vaccine (23 Valent) (SQ/IM)    TSH    Comprehensive Metabolic Panel    potassium chloride (MICRO-K) 10 MEQ CpSR        Medication List with Changes/Refills   New Medications    POTASSIUM CHLORIDE (MICRO-K) 10 MEQ CPSR    Take 2 capsules (20 mEq total) by mouth once daily. for 30 doses   Current Medications    ATORVASTATIN (LIPITOR) 20 MG TABLET    Take 1 tablet (20 mg total) by mouth once daily.    CHOLECALCIFEROL, VITAMIN D3, 100 MCG (4,000 UNIT) CAP CAPSULE    Take 400 Units by mouth once daily. 2 po daily    DENOSUMAB (PROLIA) 60 MG/ML SYRG    Inject 60 mg into the skin.    FUROSEMIDE (LASIX) 20 MG TABLET    Take 20 mg by mouth once daily at 6am.    HYDROCHLOROTHIAZIDE (HYDRODIURIL) 25 MG TABLET    Take 1 tablet (25 mg total) by mouth once daily.    LETROZOLE (FEMARA) 2.5 MG TAB    Take 1 tablet by mouth once daily    LEVOTHYROXINE (SYNTHROID) 50 MCG TABLET    TAKE 1 TABLET BY MOUTH BEFORE BREAKFAST.    METOPROLOL TARTRATE (LOPRESSOR) 25 MG TABLET    TAKE 1 TABLET TWICE DAILY    MULTIVIT-MIN/IRON/FOLIC/LUTEIN (CENTRUM SILVER WOMEN ORAL)    Take 1 tablet by mouth once daily.   Discontinued Medications    METOPROLOL SUCCINATE (TOPROL-XL) 25 MG 24 HR TABLET    Take 25 mg by mouth.    POTASSIUM CHLORIDE SA (K-DUR,KLOR-CON) 20 MEQ TABLET    Take 1 tablet (20 mEq total) by mouth once daily.

## 2023-04-17 ENCOUNTER — INFUSION (OUTPATIENT)
Dept: INFUSION THERAPY | Facility: HOSPITAL | Age: 68
End: 2023-04-17
Attending: INTERNAL MEDICINE
Payer: MEDICARE

## 2023-04-17 VITALS
DIASTOLIC BLOOD PRESSURE: 85 MMHG | WEIGHT: 221 LBS | HEART RATE: 72 BPM | HEIGHT: 64 IN | RESPIRATION RATE: 18 BRPM | SYSTOLIC BLOOD PRESSURE: 150 MMHG | BODY MASS INDEX: 37.73 KG/M2

## 2023-04-17 DIAGNOSIS — C50.211 MALIGNANT NEOPLASM OF UPPER-INNER QUADRANT OF RIGHT BREAST IN FEMALE, ESTROGEN RECEPTOR POSITIVE: ICD-10-CM

## 2023-04-17 DIAGNOSIS — Z79.811 USE OF LETROZOLE (FEMARA): ICD-10-CM

## 2023-04-17 DIAGNOSIS — M81.0 OSTEOPOROSIS, UNSPECIFIED OSTEOPOROSIS TYPE, UNSPECIFIED PATHOLOGICAL FRACTURE PRESENCE: Primary | ICD-10-CM

## 2023-04-17 DIAGNOSIS — Z17.0 MALIGNANT NEOPLASM OF UPPER-INNER QUADRANT OF RIGHT BREAST IN FEMALE, ESTROGEN RECEPTOR POSITIVE: ICD-10-CM

## 2023-04-17 PROCEDURE — 96372 THER/PROPH/DIAG INJ SC/IM: CPT

## 2023-04-17 PROCEDURE — 63600175 PHARM REV CODE 636 W HCPCS: Mod: JZ,JG | Performed by: NURSE PRACTITIONER

## 2023-04-17 RX ADMIN — DENOSUMAB 60 MG: 60 INJECTION SUBCUTANEOUS at 09:04

## 2023-04-17 NOTE — NURSING
Pt states she may need tooth pulled. Verified with Pharmacy ([facility]) if prolia injection was ok to administer. States ok to give. Pt instructed to inform dentist about last prolia dose. Verbalized understanding. Discharged home.

## 2023-05-10 DIAGNOSIS — I10 PRIMARY HYPERTENSION: Primary | ICD-10-CM

## 2023-05-10 RX ORDER — METOPROLOL TARTRATE 25 MG/1
TABLET, FILM COATED ORAL
Qty: 180 TABLET | Refills: 1 | Status: SHIPPED | OUTPATIENT
Start: 2023-05-10 | End: 2023-12-13

## 2023-06-23 ENCOUNTER — LAB VISIT (OUTPATIENT)
Dept: LAB | Facility: HOSPITAL | Age: 68
End: 2023-06-23
Attending: INTERNAL MEDICINE
Payer: MEDICARE

## 2023-06-23 DIAGNOSIS — Z17.0 MALIGNANT NEOPLASM OF UPPER-INNER QUADRANT OF RIGHT BREAST IN FEMALE, ESTROGEN RECEPTOR POSITIVE: ICD-10-CM

## 2023-06-23 DIAGNOSIS — M81.0 OSTEOPOROSIS, UNSPECIFIED OSTEOPOROSIS TYPE, UNSPECIFIED PATHOLOGICAL FRACTURE PRESENCE: ICD-10-CM

## 2023-06-23 DIAGNOSIS — C50.211 MALIGNANT NEOPLASM OF UPPER-INNER QUADRANT OF RIGHT BREAST IN FEMALE, ESTROGEN RECEPTOR POSITIVE: ICD-10-CM

## 2023-06-23 DIAGNOSIS — Z79.811 USE OF LETROZOLE (FEMARA): ICD-10-CM

## 2023-06-23 LAB
ALBUMIN SERPL-MCNC: 4.2 G/DL (ref 3.4–4.8)
ALBUMIN/GLOB SERPL: 1.3 RATIO (ref 1.1–2)
ALP SERPL-CCNC: 97 UNIT/L (ref 40–150)
ALT SERPL-CCNC: 37 UNIT/L (ref 0–55)
AST SERPL-CCNC: 34 UNIT/L (ref 5–34)
BASOPHILS # BLD AUTO: 0.06 X10(3)/MCL
BASOPHILS NFR BLD AUTO: 0.9 %
BILIRUBIN DIRECT+TOT PNL SERPL-MCNC: 0.8 MG/DL
BUN SERPL-MCNC: 13.8 MG/DL (ref 9.8–20.1)
CALCIUM SERPL-MCNC: 9.7 MG/DL (ref 8.4–10.2)
CHLORIDE SERPL-SCNC: 100 MMOL/L (ref 98–107)
CO2 SERPL-SCNC: 32 MMOL/L (ref 23–31)
CREAT SERPL-MCNC: 0.8 MG/DL (ref 0.55–1.02)
EOSINOPHIL # BLD AUTO: 0.14 X10(3)/MCL (ref 0–0.9)
EOSINOPHIL NFR BLD AUTO: 2 %
ERYTHROCYTE [DISTWIDTH] IN BLOOD BY AUTOMATED COUNT: 12.7 % (ref 11.5–17)
GFR SERPLBLD CREATININE-BSD FMLA CKD-EPI: >60 MLS/MIN/1.73/M2
GLOBULIN SER-MCNC: 3.2 GM/DL (ref 2.4–3.5)
GLUCOSE SERPL-MCNC: 114 MG/DL (ref 82–115)
HCT VFR BLD AUTO: 40.8 % (ref 37–47)
HGB BLD-MCNC: 13.2 G/DL (ref 12–16)
IMM GRANULOCYTES # BLD AUTO: 0.01 X10(3)/MCL (ref 0–0.04)
IMM GRANULOCYTES NFR BLD AUTO: 0.1 %
LYMPHOCYTES # BLD AUTO: 2.26 X10(3)/MCL (ref 0.6–4.6)
LYMPHOCYTES NFR BLD AUTO: 32.5 %
MCH RBC QN AUTO: 30.6 PG (ref 27–31)
MCHC RBC AUTO-ENTMCNC: 32.4 G/DL (ref 33–36)
MCV RBC AUTO: 94.7 FL (ref 80–94)
MONOCYTES # BLD AUTO: 0.66 X10(3)/MCL (ref 0.1–1.3)
MONOCYTES NFR BLD AUTO: 9.5 %
NEUTROPHILS # BLD AUTO: 3.83 X10(3)/MCL (ref 2.1–9.2)
NEUTROPHILS NFR BLD AUTO: 55 %
PLATELET # BLD AUTO: 206 X10(3)/MCL (ref 130–400)
PMV BLD AUTO: 10.7 FL (ref 7.4–10.4)
POTASSIUM SERPL-SCNC: 3.3 MMOL/L (ref 3.5–5.1)
PROT SERPL-MCNC: 7.4 GM/DL (ref 5.8–7.6)
RBC # BLD AUTO: 4.31 X10(6)/MCL (ref 4.2–5.4)
SODIUM SERPL-SCNC: 143 MMOL/L (ref 136–145)
WBC # SPEC AUTO: 6.96 X10(3)/MCL (ref 4.5–11.5)

## 2023-06-23 PROCEDURE — 36415 COLL VENOUS BLD VENIPUNCTURE: CPT

## 2023-06-23 PROCEDURE — 86300 IMMUNOASSAY TUMOR CA 15-3: CPT

## 2023-06-23 PROCEDURE — 85025 COMPLETE CBC W/AUTO DIFF WBC: CPT

## 2023-06-23 PROCEDURE — 80053 COMPREHEN METABOLIC PANEL: CPT

## 2023-06-27 LAB — CANCER AG27-29 SERPL-ACNC: 25 U/ML

## 2023-06-28 ENCOUNTER — OFFICE VISIT (OUTPATIENT)
Dept: HEMATOLOGY/ONCOLOGY | Facility: CLINIC | Age: 68
End: 2023-06-28
Payer: MEDICARE

## 2023-06-28 ENCOUNTER — TELEPHONE (OUTPATIENT)
Dept: HEMATOLOGY/ONCOLOGY | Facility: CLINIC | Age: 68
End: 2023-06-28
Payer: MEDICARE

## 2023-06-28 VITALS
TEMPERATURE: 98 F | SYSTOLIC BLOOD PRESSURE: 131 MMHG | HEIGHT: 64 IN | OXYGEN SATURATION: 97 % | WEIGHT: 219.13 LBS | BODY MASS INDEX: 37.41 KG/M2 | HEART RATE: 67 BPM | DIASTOLIC BLOOD PRESSURE: 82 MMHG

## 2023-06-28 DIAGNOSIS — Z17.0 MALIGNANT NEOPLASM OF UPPER-INNER QUADRANT OF RIGHT BREAST IN FEMALE, ESTROGEN RECEPTOR POSITIVE: Primary | ICD-10-CM

## 2023-06-28 DIAGNOSIS — Z90.13 HISTORY OF BILATERAL MASTECTOMY: ICD-10-CM

## 2023-06-28 DIAGNOSIS — D05.12 DUCTAL CARCINOMA IN SITU (DCIS) OF LEFT BREAST: ICD-10-CM

## 2023-06-28 DIAGNOSIS — C50.211 MALIGNANT NEOPLASM OF UPPER-INNER QUADRANT OF RIGHT BREAST IN FEMALE, ESTROGEN RECEPTOR POSITIVE: Primary | ICD-10-CM

## 2023-06-28 DIAGNOSIS — Z79.811 USE OF LETROZOLE (FEMARA): ICD-10-CM

## 2023-06-28 DIAGNOSIS — Z51.81 THERAPEUTIC DRUG MONITORING: ICD-10-CM

## 2023-06-28 PROCEDURE — 99999 PR PBB SHADOW E&M-EST. PATIENT-LVL IV: CPT | Mod: PBBFAC,,, | Performed by: INTERNAL MEDICINE

## 2023-06-28 PROCEDURE — 3075F PR MOST RECENT SYSTOLIC BLOOD PRESS GE 130-139MM HG: ICD-10-PCS | Mod: CPTII,S$GLB,, | Performed by: INTERNAL MEDICINE

## 2023-06-28 PROCEDURE — 1101F PR PT FALLS ASSESS DOC 0-1 FALLS W/OUT INJ PAST YR: ICD-10-PCS | Mod: CPTII,S$GLB,, | Performed by: INTERNAL MEDICINE

## 2023-06-28 PROCEDURE — 99215 OFFICE O/P EST HI 40 MIN: CPT | Mod: S$GLB,,, | Performed by: INTERNAL MEDICINE

## 2023-06-28 PROCEDURE — 3008F BODY MASS INDEX DOCD: CPT | Mod: CPTII,S$GLB,, | Performed by: INTERNAL MEDICINE

## 2023-06-28 PROCEDURE — 3288F FALL RISK ASSESSMENT DOCD: CPT | Mod: CPTII,S$GLB,, | Performed by: INTERNAL MEDICINE

## 2023-06-28 PROCEDURE — 99999 PR PBB SHADOW E&M-EST. PATIENT-LVL IV: ICD-10-PCS | Mod: PBBFAC,,, | Performed by: INTERNAL MEDICINE

## 2023-06-28 PROCEDURE — 3079F DIAST BP 80-89 MM HG: CPT | Mod: CPTII,S$GLB,, | Performed by: INTERNAL MEDICINE

## 2023-06-28 PROCEDURE — 3288F PR FALLS RISK ASSESSMENT DOCUMENTED: ICD-10-PCS | Mod: CPTII,S$GLB,, | Performed by: INTERNAL MEDICINE

## 2023-06-28 PROCEDURE — 3075F SYST BP GE 130 - 139MM HG: CPT | Mod: CPTII,S$GLB,, | Performed by: INTERNAL MEDICINE

## 2023-06-28 PROCEDURE — 1159F MED LIST DOCD IN RCRD: CPT | Mod: CPTII,S$GLB,, | Performed by: INTERNAL MEDICINE

## 2023-06-28 PROCEDURE — 1159F PR MEDICATION LIST DOCUMENTED IN MEDICAL RECORD: ICD-10-PCS | Mod: CPTII,S$GLB,, | Performed by: INTERNAL MEDICINE

## 2023-06-28 PROCEDURE — 99215 PR OFFICE/OUTPT VISIT, EST, LEVL V, 40-54 MIN: ICD-10-PCS | Mod: S$GLB,,, | Performed by: INTERNAL MEDICINE

## 2023-06-28 PROCEDURE — 3008F PR BODY MASS INDEX (BMI) DOCUMENTED: ICD-10-PCS | Mod: CPTII,S$GLB,, | Performed by: INTERNAL MEDICINE

## 2023-06-28 PROCEDURE — 3079F PR MOST RECENT DIASTOLIC BLOOD PRESSURE 80-89 MM HG: ICD-10-PCS | Mod: CPTII,S$GLB,, | Performed by: INTERNAL MEDICINE

## 2023-06-28 PROCEDURE — 1101F PT FALLS ASSESS-DOCD LE1/YR: CPT | Mod: CPTII,S$GLB,, | Performed by: INTERNAL MEDICINE

## 2023-06-28 PROCEDURE — 1126F PR PAIN SEVERITY QUANTIFIED, NO PAIN PRESENT: ICD-10-PCS | Mod: CPTII,S$GLB,, | Performed by: INTERNAL MEDICINE

## 2023-06-28 PROCEDURE — 1126F AMNT PAIN NOTED NONE PRSNT: CPT | Mod: CPTII,S$GLB,, | Performed by: INTERNAL MEDICINE

## 2023-06-28 NOTE — PROGRESS NOTES
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 2022 BL SC MG at [facility]-which revealed on R MG lower inner quadrant anterior depth, there are two focal asymmetries. On L MG central region posterior depth there are indeterminate calcifications. At 12 o'clock left breast middle depth, there is a focal asymmetry with calcifications. There are stable post-therapy changes of the left breast. No detrimental change at the lumpectomy bed. There are stable post-core needle biopsy changes and a jesús shaped clip in the right breast. No detrimental change at this biopsy site. No other significant masses, calcifications, or other findings are seen in either breast. BIRADS-0 ; additional imaging needed.    2. 2022 BL DG MG/ BL US BREAST LIMITED at [facility]- which revealed on R MG at 4 o'clock subareolar anterior there is a 0.7 cm oval, circumscribed mass. On L MG 12 o'clock, anterior depth, there is a 1.2 cm focal asymmetry with associated heterogeneous calcifications. These findings correspond to the areas recalled on the screening examination dated 2022. No other suspicious masses, calcifications, or other signs of malignancy are  identified. On R US, at 3 o'clock subareolar there is a 0.6 x 0.4 x 0.4 cm oval, hypoechoic mass with indistinct margins. At 4 o'clock subareolar right breast, there is a 0.7 x 0.3 x 0.6 cm benign, simple cyst. These correspond to the mammographic findings in these regions. No other suspicious sonographic abnormality is seen. Specifically, no suspicious sonographic correlate is seen for the focal asymmetry in the 12 o'clock left breast. Benign-appearing lymph nodes are noted in the bilateral axillae. BIRADS-4 suspicious; biopsy recommended.    Bone density 9/21/22:   LUMBAR SPINE The L1 to L3 vertebral bone mineral density is equal to 1.121 g/cm squared with a T score of -0.5.  LEFT HIP The left femoral neck bone mineral density is equal to 0.744 g/cm squared with a T score of -2.1.   The left total hip bone mineral density is equal to 0.816 g/cm squared with a T score of -1.5.   RIGHT HIP The right femoral neck bone mineral density is equal to 0.694 g/cm squared with a T score of -2.5.  The right total hip bone mineral density is equal to 0.824 g/cm squared with a T score of -1.5.  FRAX 10-YEAR PROBABILITY OF FRACTURE: 21.4% risk of a major osteoporotic fracture and 2.9% risk of hip fracture in the next 10 years    Impression: Osteoporosis      Pathology:  3/16/2022:   [1] LEFT BREAST POSTERIOR DEPTH AMORPHOUS GROUPED CALCIFICATIONS CENTRAL REGION: DUCTAL CARCINOMA IN SITU, LOW GRADE CRIBRIFORM/MICROPAPILLARY TYPE, WITH MICROCALCIFICATIONS. DCIS is present on two core biopsies, the largest focus measuring less than 3 mm.   [2] LEFT BREAST 12 O' CLOCK ANTERIOR DEPTH FOCAL ASYMMETRY WITH HETEROGENOUS CALCIFICATIONS:SCLEROTIC FIBROADENOMATOID NODULE WITH DYSTROPIC CALCIFICATION.   [3] RIGHT BREAST 3 O' CLOCK SUBAREOLAR MASS: INVASIVE DUCTAL CARCINOMA, SOTO-ROMERO GRADE 1.  Carcinoma is present on two core biopsies and measures 5 mm.    %, %, Her2 neg, Ki67 58%.    4/25/2022:  [1]  BREAST, LEFT, SIMPLE  MASTECTOMY: DIMINUTIVE FOCUS OF RESIDUAL LOW GRADE DUCTAL CARCINOMA IN SITU.  [2]  BREAST, RIGHT, SIMPLE MASTECTOMY: INVASIVE DUCTAL CARCINOMA, LOW GRADE, GRADE 1(OF 3).  -Tumor size:  4.0 mm. pT Category:  pT1a  pN0  BREAST, RIGHT, AXILLARY SENTINEL NODE #4/4: NEGATIVE FOR METASTATIC CARCINOMA.  The patient's previous history of low grade invasive ductal carcinoma is noted from surgical pathology report [id number] ER (100%), DE (100%), HER2 negative, Ki-67 high (58%).              CLINICAL HISTORY:       Patient: Riana Pastrana is a 67 y.o. female kindly referred for history of breast cancer.  I do not have record of her diagnosis and treatment of her original breast cancer.    Patient states that she was diagnosed with breast cancer in 2005.  She had Left lumpectomy done, with no chemotherapy or radiation. She took tamoxifen for 5 years. She reports that she was treated by Dr. Robbin Kenny. She says that she was seen at [facility], but since her insurance has changed she would like to establish care here.     Patient reports menarche at 13. She had 3 pregnancies, 2 live children, 1 miscarriage. Her first child was at age 20.She had a partial hysterectomy at age 29. She admits to oral contraceptives for about 3 years. No hormone replacement therapy.     Her sister had ovarian cancer diagnosed in her 50's, currently still alive at age 80.    She reports that she had a mass in her stomach and it was removed in 2017 along with her ovaries at [facility].  Again I do not have the records, but imaging studies none here include CT scan of the abdomen and pelvis done on 5/21/2017 for an abdominal distention showed a 22 x 21 x 16 cm complex septated peripherally enhancing mass which appears to arise from the right adnexa, presumably ovarian in origin.  Uterus not identified.  Cystic mass in the abdomen and pelvis displaces the urinary bladder inferiorly.  Liver is is heterogeneous in attenuation and contains  2 low-attenuation masses in the dome, too small to accurately characterize by CT.  Spleen normal in size. CT of the chest showed cardiomegaly with moderate pericardial effusion.  Bilateral lower lobe consolidation with atelectasis and bilateral pleural effusions, left greater than right. Large abdominal cystic mass of unknown etiology. Further assessment with dedicated imaging of the abdomen and pelvis recommended. US pelvis 5/22/2021:  A large, multilocular mass consistent with the CT findings is identified measuring 27.5 x 20.1 x 19.6 cm. There are multiple septations or separate fluid loculations in the large cystic mass. Low level internal echogenicity is also visible in the cyst fluid with dependent debris also identified. No free pelvic fluid is visible. This lesion is suspicious for neoplasm of pelvic and likely ovarian origin. The uterus is not identified and surgically absent by patient history. 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  Ascites  suggestive of peritoneal implants,  the ascites is new from the previous study. No Path available    On 2/7/2022 screening mammogram: INCOMPLETE: NEEDS ADDITIONAL IMAGING EVALUATION  Right breast focal asymmetries, left breast calcifications, and left breast focal asymmetry with calcifications need further evaluation. BI-RADS 0: Incomplete. Need additional imaging evaluation.     On 2/24/2022 Diagnostic right mammogram and Breast US: SUSPICIOUS OF MALIGNANCY  1. Oval, hypoechoic mass with indistinct margins in the 3:00 subareolar right breast is suspicious. Right breast ultrasound-guided biopsy is recommended.  2. Amorphous, grouped calcifications in the central left breast, posterior depth, are suspicious. Left breast stereotactic guided biopsy is recommended.  3. Focal asymmetry with associated heterogeneous calcifications in the 12:00 left breast, anterior depth, is suspicious. Left breast stereotactic guided  biopsy is recommended.     On 3/16/2022 she is schedule for breast bx. otherwise she is doing well and voices no concerns.  No fever, chills, sweats.  No chest pain or shortness of breath.  No changes in bowel habits.  No abdominal or pelvic pain.  No neurological symptoms.    [1] LEFT BREAST POSTERIOR DEPTH AMORPHOUS GROUPED CALCIFICATIONS CENTRAL REGION:  DUCTAL CARCINOMA IN SITU, LOW GRADE CRIBRIFORM/MICROPAPILLARY TYPE, WITH MICROCALCIFICATIONS.  COMMENT: DCIS is present on two core biopsies, the largest focus measuring less than 3 mm. The results of ER/GA analysis will be the subject of an addendum report.  [2] LEFT BREAST 12 O' CLOCK ANTERIOR DEPTH FOCAL ASYMMETRY WITH HETEROGENOUS CALCIFICATIONS:  SCLEROTIC FIBROADENOMATOID NODULE WITH DYSTROPIC CALCIFICATION.  [3] RIGHT BREAST 3 O' CLOCK SUBAREOLAR MASS:  INVASIVE DUCTAL CARCINOMA, SOTO-ROMERO GRADE 1.  COMMENT: Carcinoma is present on two core biopsies and measures 5 mm.    %, %, Her2 neg, Ki67 58%.      Oncotype showed a recurrence score of 18 with a distant recurrent risk at 9 years of 5% with AI or tamoxifen alone and <1% absolute chemotherapy benefit.     Chief Complaint: OTHER (No concerns today.)      Interval History:  Patient presents today for follow up in surveillance of her breast cancer. She is taking Letrozole and tolerating well (started March 2022). She is also taking Prolia, next due on 10/17/23. She reports pain in hands, but attributes it to doing a lot of work around the house using her hands. Overall, she is doing well. She denies any fever, chills, sweats. No chest pain or shortness of breath.  No changes in bowel habits.  Takes calcium + vitamin D. She's asking if okay to take angelo root, heard it works good as an anti-inflammatory.  Edema noted to BLE during physical exam. Patient states she is on a diuretic, but did not take it this morning because of doctor appointment, plans to take when she returns  home.          Past Medical History:   Diagnosis Date    History of bilateral breast cancer     left in 2007; bilateral in 2022    Hyperlipidemia 5/13/2022    Hypertension     Hypothyroidism 5/13/2022    Severe obesity (BMI 35.0-39.9) with comorbidity 5/13/2022      Past Surgical History:   Procedure Laterality Date    BREAST BIOPSY      BREAST SURGERY      HYSTERECTOMY      TONSILLECTOMY       Family History   Problem Relation Age of Onset    Ovarian cancer Sister 50     Social Connections: Not on file       Review of patient's allergies indicates:   Allergen Reactions    Penicillins Other (See Comments)     UNKNOWN REACTION. ALLERGY SINCE CHILDHOOD        Current Outpatient Medications on File Prior to Visit   Medication Sig Dispense Refill    atorvastatin (LIPITOR) 20 MG tablet Take 1 tablet (20 mg total) by mouth once daily. 90 tablet 2    cholecalciferol, vitamin D3, 100 mcg (4,000 unit) Cap capsule Take 400 Units by mouth once daily. 2 po daily      denosumab (PROLIA) 60 mg/mL Syrg Inject 60 mg into the skin.      furosemide (LASIX) 20 MG tablet TAKE 1 TABLET EVERY DAY AS DIRECTED 90 tablet 0    hydroCHLOROthiazide (HYDRODIURIL) 25 MG tablet TAKE 1 TABLET EVERY DAY 90 tablet 2    letrozole (FEMARA) 2.5 mg Tab Take 1 tablet (2.5 mg total) by mouth once daily. 30 tablet 3    levothyroxine (SYNTHROID) 50 MCG tablet TAKE 1 TABLET BY MOUTH BEFORE BREAKFAST. 90 tablet 1    meloxicam (MOBIC) 7.5 MG tablet Take 1 tablet by mouth daily as needed.      metoprolol tartrate (LOPRESSOR) 25 MG tablet TAKE 1 TABLET TWICE DAILY 180 tablet 1    multivit-min/iron/folic/lutein (CENTRUM SILVER WOMEN ORAL) Take 1 tablet by mouth once daily.      potassium chloride (MICRO-K) 10 MEQ CpSR Take 1 capsule (10 mEq total) by mouth once daily. 90 capsule 1     No current facility-administered medications on file prior to visit.      Review of Systems   Constitutional:  Negative for activity change, appetite change, chills, fatigue, fever  "and unexpected weight change.   HENT:  Negative for mouth dryness, mouth sores, nosebleeds, sore throat and trouble swallowing.    Eyes:  Negative for visual disturbance.   Respiratory:  Negative for cough, chest tightness and shortness of breath.    Cardiovascular:  Negative for chest pain, palpitations and leg swelling.   Gastrointestinal:  Negative for abdominal distention, abdominal pain, blood in stool, change in bowel habit, constipation, diarrhea, nausea, vomiting and change in bowel habit.   Endocrine: Negative.    Genitourinary:  Negative for dysuria, frequency, hematuria and urgency.   Musculoskeletal:  Negative for arthralgias, back pain, myalgias and neck pain.   Integumentary:  Negative for rash.   Neurological:  Negative for dizziness, tremors, syncope, speech difficulty, weakness, light-headedness, numbness, headaches and memory loss.   Hematological:  Does not bruise/bleed easily.   Psychiatric/Behavioral:  Negative for confusion and suicidal ideas.             Vitals:    06/28/23 0954   BP: 131/82   BP Location: Right arm   Patient Position: Sitting   Pulse: 67   Temp: 98.1 °F (36.7 °C)   TempSrc: Oral   SpO2: 97%   Weight: 99.4 kg (219 lb 1.6 oz)   Height: 5' 4" (1.626 m)          Physical Exam  Vitals and nursing note reviewed.   Constitutional:       General: She is not in acute distress.     Appearance: Normal appearance. She is well-developed. She is obese.   HENT:      Head: Normocephalic and atraumatic.      Mouth/Throat:      Mouth: Mucous membranes are moist.   Eyes:      General: No scleral icterus.     Extraocular Movements: Extraocular movements intact.      Conjunctiva/sclera: Conjunctivae normal.      Pupils: Pupils are equal, round, and reactive to light.   Neck:      Vascular: No JVD.   Cardiovascular:      Rate and Rhythm: Normal rate. Rhythm irregularly irregular.      Heart sounds: No murmur heard.  Pulmonary:      Effort: Pulmonary effort is normal.      Breath sounds: Normal " breath sounds. No wheezing or rhonchi.   Chest:      Chest wall: No deformity or tenderness.   Breasts:     Right: Absent. No swelling, mass, nipple discharge, skin change or tenderness.      Left: Absent. No swelling, mass, nipple discharge, skin change or tenderness.   Abdominal:      General: Bowel sounds are normal. There is no distension.      Palpations: Abdomen is soft. There is no mass.      Tenderness: There is no abdominal tenderness.   Musculoskeletal:         General: No swelling or deformity.      Cervical back: Neck supple.   Lymphadenopathy:      Head:      Right side of head: No submandibular adenopathy.      Left side of head: No submandibular adenopathy.      Cervical: No cervical adenopathy.      Upper Body:      Right upper body: No supraclavicular or axillary adenopathy.      Left upper body: No supraclavicular or axillary adenopathy.      Lower Body: No right inguinal adenopathy. No left inguinal adenopathy.   Skin:     General: Skin is warm.      Coloration: Skin is not jaundiced.      Findings: No lesion or rash.      Nails: There is no clubbing.   Neurological:      General: No focal deficit present.      Mental Status: She is alert and oriented to person, place, and time.      Cranial Nerves: Cranial nerves 2-12 are intact.      Sensory: Sensation is intact.      Motor: Motor function is intact.      Gait: Gait is intact.   Psychiatric:         Attention and Perception: Attention normal.         Mood and Affect: Mood and affect normal.         Speech: Speech normal.         Behavior: Behavior is cooperative.         Thought Content: Thought content normal.         Cognition and Memory: Cognition normal.         Judgment: Judgment normal.     ECOG SCORE    0 - Fully active-able to carry on all pre-disease performance without restriction         Laboratory:   Latest Reference Range & Units 05/10/22 10:54   WBC 4.5 - 11.5 x10(3)/mcL 6.0   RBC 4.20 - 5.40 x10(6)/mcL 4.08 (L)   Hemoglobin 12.0 -  16.0 gm/dL 12.6   Hematocrit 37.0 - 47.0 % 38.2   MCV 80.0 - 94.0 fL 93.6   MCH 27.0 - 31.0 pg 30.9   MCHC 33.0 - 36.0 mg/dL 33.0   RDW 11.5 - 17.0 % 12.5   Platelets 130 - 400 x10(3)/mcL 255   (L): Data is abnormally low       Assessment:       1) Left breast cancer-diagnosed 2005   --lumpectomy/SLNB, XRT, and 5 years of Tamoxifen   2) Mucinous cystosarcoma-Dx 2017    --SP BSO   3) Bilateral breast cancer   --DCIS-clinical anatomic stage IA / prognostic stage IA (cT1b cN0 M0) left breast Dx 3/16/2022   --Clinical anatomic stage IA / prognostic stage IA (cT1b cN0 M0) Right breast    --%, %, Her 2 neg, Ki 67 58%, Grade 1   --Bilateral mastectomies 4/25/2022, Right SLND      Plan:       Started Femara 3/2022. Prolia started in 10/2022.  Baseline DEXA scan with osteoporosis --right femoral neck bone mineral density with a T score of -2.5. Otherwise osteopenia  She will cont Femara for now and re evaluate next Bone density (due 9/2024) If bone mass worsen then with change to Tamoxifen.    Continue Femara  Continue Prolia - next due 10/17/2023  Bone density - due 9/2024  Continue Calcium and Vit D supplements and weight bearing exercises  No breast imaging as she has bilateral mastectomies  Encouraged to take diuretic when she gets home  Okay to take angelo root  RTC  in 3 months with NP - Labs: CBC, CMP, CA 27-29    Encouraged to call with questions or problems  The patient was given ample opportunity to ask questions and they were all answered to satisfaction; patient demonstrated understanding of what we discussed and is agreeable to the plan.    GRAHAM HALL MD      Professional Services   I, Radha Diamond LPN, acted solely as a scribe for and in the presence of Dr. Graham Hall, who performed these services.

## 2023-07-06 DIAGNOSIS — E78.2 MIXED HYPERLIPIDEMIA: Primary | ICD-10-CM

## 2023-07-06 RX ORDER — ATORVASTATIN CALCIUM 20 MG/1
TABLET, FILM COATED ORAL
Qty: 90 TABLET | Refills: 2 | Status: SHIPPED | OUTPATIENT
Start: 2023-07-06

## 2023-07-13 DIAGNOSIS — C50.211 MALIGNANT NEOPLASM OF UPPER-INNER QUADRANT OF RIGHT BREAST IN FEMALE, ESTROGEN RECEPTOR POSITIVE: Primary | ICD-10-CM

## 2023-07-13 DIAGNOSIS — Z17.0 MALIGNANT NEOPLASM OF UPPER-INNER QUADRANT OF RIGHT BREAST IN FEMALE, ESTROGEN RECEPTOR POSITIVE: Primary | ICD-10-CM

## 2023-07-13 RX ORDER — LETROZOLE 2.5 MG/1
2.5 TABLET, FILM COATED ORAL DAILY
Qty: 30 TABLET | Refills: 5 | Status: SHIPPED | OUTPATIENT
Start: 2023-07-13 | End: 2023-12-28 | Stop reason: SDUPTHER

## 2023-07-14 ENCOUNTER — LAB VISIT (OUTPATIENT)
Dept: LAB | Facility: HOSPITAL | Age: 68
End: 2023-07-14
Payer: MEDICARE

## 2023-07-14 ENCOUNTER — TELEPHONE (OUTPATIENT)
Dept: INTERNAL MEDICINE | Facility: CLINIC | Age: 68
End: 2023-07-14
Payer: MEDICARE

## 2023-07-14 DIAGNOSIS — E83.52 HYPERCALCEMIA: Primary | ICD-10-CM

## 2023-07-14 DIAGNOSIS — E83.52 HYPERCALCEMIA: ICD-10-CM

## 2023-07-14 DIAGNOSIS — E03.9 HYPOTHYROIDISM, UNSPECIFIED TYPE: Chronic | ICD-10-CM

## 2023-07-14 DIAGNOSIS — E55.9 VITAMIN D INSUFFICIENCY: ICD-10-CM

## 2023-07-14 DIAGNOSIS — N25.81 SECONDARY HYPERPARATHYROIDISM: ICD-10-CM

## 2023-07-14 LAB
CALCIUM SERPL-MCNC: 9.6 MG/DL (ref 8.4–10.2)
DEPRECATED CALCIDIOL+CALCIFEROL SERPL-MC: 27.9 NG/ML (ref 30–80)
PTH-INTACT SERPL-MCNC: 92.6 PG/ML (ref 8.7–77)
TSH SERPL-ACNC: 1.92 UIU/ML (ref 0.35–4.94)

## 2023-07-14 PROCEDURE — 83970 ASSAY OF PARATHORMONE: CPT

## 2023-07-14 PROCEDURE — 82310 ASSAY OF CALCIUM: CPT

## 2023-07-14 PROCEDURE — 82306 VITAMIN D 25 HYDROXY: CPT

## 2023-07-14 PROCEDURE — 84443 ASSAY THYROID STIM HORMONE: CPT

## 2023-07-14 PROCEDURE — 36415 COLL VENOUS BLD VENIPUNCTURE: CPT

## 2023-07-14 RX ORDER — ERGOCALCIFEROL 1.25 MG/1
50000 CAPSULE ORAL
Qty: 12 CAPSULE | Refills: 1 | Status: SHIPPED | OUTPATIENT
Start: 2023-07-14 | End: 2023-10-31 | Stop reason: SDUPTHER

## 2023-07-14 NOTE — TELEPHONE ENCOUNTER
Called and spoke with patient and gave her results and recommendations. Verbalized understanding.

## 2023-07-18 DIAGNOSIS — R60.9 EDEMA, UNSPECIFIED TYPE: ICD-10-CM

## 2023-07-18 RX ORDER — FUROSEMIDE 20 MG/1
TABLET ORAL
Qty: 90 TABLET | Refills: 3 | Status: SHIPPED | OUTPATIENT
Start: 2023-07-18

## 2023-07-20 ENCOUNTER — TELEPHONE (OUTPATIENT)
Dept: INTERNAL MEDICINE | Facility: CLINIC | Age: 68
End: 2023-07-20
Payer: MEDICARE

## 2023-07-20 DIAGNOSIS — R52 PAIN: Primary | ICD-10-CM

## 2023-07-20 DIAGNOSIS — N25.81 SECONDARY HYPERPARATHYROIDISM: Primary | ICD-10-CM

## 2023-07-20 RX ORDER — LEVOTHYROXINE SODIUM 50 UG/1
TABLET ORAL
Qty: 90 TABLET | Refills: 1 | Status: SHIPPED | OUTPATIENT
Start: 2023-07-20 | End: 2024-02-26

## 2023-07-20 RX ORDER — MELOXICAM 7.5 MG/1
TABLET ORAL
Qty: 30 TABLET | Refills: 3 | Status: SHIPPED | OUTPATIENT
Start: 2023-07-20 | End: 2023-11-20

## 2023-07-27 ENCOUNTER — OFFICE VISIT (OUTPATIENT)
Dept: INTERNAL MEDICINE | Facility: CLINIC | Age: 68
End: 2023-07-27
Payer: MEDICARE

## 2023-07-27 VITALS
TEMPERATURE: 97 F | OXYGEN SATURATION: 97 % | HEART RATE: 61 BPM | DIASTOLIC BLOOD PRESSURE: 66 MMHG | BODY MASS INDEX: 37.73 KG/M2 | HEIGHT: 64 IN | SYSTOLIC BLOOD PRESSURE: 134 MMHG | WEIGHT: 221 LBS

## 2023-07-27 DIAGNOSIS — E03.9 HYPOTHYROIDISM, UNSPECIFIED TYPE: Chronic | ICD-10-CM

## 2023-07-27 DIAGNOSIS — R79.89 INCREASED PTH LEVEL: ICD-10-CM

## 2023-07-27 DIAGNOSIS — M81.0 AGE-RELATED OSTEOPOROSIS WITHOUT CURRENT PATHOLOGICAL FRACTURE: Chronic | ICD-10-CM

## 2023-07-27 DIAGNOSIS — E55.9 VITAMIN D DEFICIENCY: Primary | ICD-10-CM

## 2023-07-27 PROCEDURE — 3008F PR BODY MASS INDEX (BMI) DOCUMENTED: ICD-10-PCS | Mod: CPTII,,,

## 2023-07-27 PROCEDURE — 1101F PR PT FALLS ASSESS DOC 0-1 FALLS W/OUT INJ PAST YR: ICD-10-PCS | Mod: CPTII,,,

## 2023-07-27 PROCEDURE — 3288F PR FALLS RISK ASSESSMENT DOCUMENTED: ICD-10-PCS | Mod: CPTII,,,

## 2023-07-27 PROCEDURE — 1159F MED LIST DOCD IN RCRD: CPT | Mod: CPTII,,,

## 2023-07-27 PROCEDURE — 1126F PR PAIN SEVERITY QUANTIFIED, NO PAIN PRESENT: ICD-10-PCS | Mod: CPTII,,,

## 2023-07-27 PROCEDURE — 99214 PR OFFICE/OUTPT VISIT, EST, LEVL IV, 30-39 MIN: ICD-10-PCS | Mod: ,,,

## 2023-07-27 PROCEDURE — 3078F DIAST BP <80 MM HG: CPT | Mod: CPTII,,,

## 2023-07-27 PROCEDURE — 3075F PR MOST RECENT SYSTOLIC BLOOD PRESS GE 130-139MM HG: ICD-10-PCS | Mod: CPTII,,,

## 2023-07-27 PROCEDURE — 1160F RVW MEDS BY RX/DR IN RCRD: CPT | Mod: CPTII,,,

## 2023-07-27 PROCEDURE — 1101F PT FALLS ASSESS-DOCD LE1/YR: CPT | Mod: CPTII,,,

## 2023-07-27 PROCEDURE — 1126F AMNT PAIN NOTED NONE PRSNT: CPT | Mod: CPTII,,,

## 2023-07-27 PROCEDURE — 3288F FALL RISK ASSESSMENT DOCD: CPT | Mod: CPTII,,,

## 2023-07-27 PROCEDURE — 3008F BODY MASS INDEX DOCD: CPT | Mod: CPTII,,,

## 2023-07-27 PROCEDURE — 3075F SYST BP GE 130 - 139MM HG: CPT | Mod: CPTII,,,

## 2023-07-27 PROCEDURE — 99214 OFFICE O/P EST MOD 30 MIN: CPT | Mod: ,,,

## 2023-07-27 PROCEDURE — 1160F PR REVIEW ALL MEDS BY PRESCRIBER/CLIN PHARMACIST DOCUMENTED: ICD-10-PCS | Mod: CPTII,,,

## 2023-07-27 PROCEDURE — 1159F PR MEDICATION LIST DOCUMENTED IN MEDICAL RECORD: ICD-10-PCS | Mod: CPTII,,,

## 2023-07-27 PROCEDURE — 3078F PR MOST RECENT DIASTOLIC BLOOD PRESSURE < 80 MM HG: ICD-10-PCS | Mod: CPTII,,,

## 2023-07-27 NOTE — ASSESSMENT & PLAN NOTE
-does also have concurrently elevated PTH level, likely secondary  hyperparathyroidism  -recently placed on vitamin-D 85803 units weekly within last 2 weeks  -repeat PTH, vitamin-D, and CMP in 2 months to evaluate if improvement now on vitamin-D replacement

## 2023-07-27 NOTE — PROGRESS NOTES
Patient ID: Riana Pastrana is a 67 y.o. female.    Chief Complaint: Follow-up (4 mth f/u thyroid/)    67-year-old female here today for three-month month follow-up visit.  Medical comorbidities include the PAF, HTN, HLD, and breast cancer.  Also with history of left breast cancer s/p  lumpectomy/SLNB, radiation, and Tamoxifen. Now s/p bilateral mastectomy (04/25/2022) for Malignant neoplasm of central portion of right female breast and Ductal carcinoma in situ left breast. Followed by oncology.  Since last visit patient reports has been fairly well without acute injury or major illness.  Most recent TSH level noted well controlled currently on levothyroxine 50 mcg.  Previously noted to have persistently elevated serum calcium levels.  Most recent (07/14/2023) serum calcium 9.9, however noted with vitamin-D deficiency 27.9 and elevated PTH 92.6.  Patient subsequently initiated on vitamin-D 96280 units once weekly (07/17/2023).  Suspect possible secondary hyperparathyroidism due to vitamin-D deficiency.  Otherwise patient reports feeling generally well without acute issues or complaints.         MCW: 11/23/2022      MEDICAL HISTORY:    Past Medical History:   Diagnosis Date    History of bilateral breast cancer     left in 2007; bilateral in 2022    Hyperlipidemia 5/13/2022    Hypertension     Hypothyroidism 5/13/2022    Severe obesity (BMI 35.0-39.9) with comorbidity 5/13/2022      Past Surgical History:   Procedure Laterality Date    BREAST BIOPSY      BREAST SURGERY      HYSTERECTOMY      TONSILLECTOMY        Social History     Tobacco Use    Smoking status: Never    Smokeless tobacco: Never   Substance Use Topics    Alcohol use: Never    Drug use: Never          Health Maintenance Due   Topic Date Due    Hepatitis C Screening  Never done    TETANUS VACCINE  Never done    Shingles Vaccine (1 of 2) Never done    COVID-19 Vaccine (5 - Pfizer series) 02/26/2023          Patient Care Team:  Chely CARRERA  "MD Rakesh as PCP - General (Internal Medicine)  Judi Richards MD as Consulting Physician (Hematology and Oncology)      Review of Systems   Constitutional:  Negative for fatigue and fever.   HENT:  Negative for congestion, rhinorrhea, sore throat and trouble swallowing.    Eyes:  Negative for redness and visual disturbance.   Respiratory:  Negative for cough, chest tightness and shortness of breath.    Cardiovascular:  Negative for chest pain and palpitations.   Gastrointestinal:  Negative for abdominal pain, constipation, diarrhea, nausea and vomiting.   Genitourinary:  Negative for dysuria, flank pain, frequency and urgency.   Musculoskeletal:  Negative for arthralgias, gait problem and myalgias.   Skin:  Negative for rash and wound.   Neurological:  Negative for facial asymmetry, speech difficulty, weakness and headaches.   All other systems reviewed and are negative.    Objective:   /66 (BP Location: Right arm, Patient Position: Sitting, BP Method: Large (Manual))   Pulse 61   Temp 97 °F (36.1 °C) (Temporal)   Ht 5' 4.02" (1.626 m)   Wt 100.2 kg (221 lb)   SpO2 97%   BMI 37.92 kg/m²      Physical Exam  Constitutional:       General: She is not in acute distress.     Appearance: Normal appearance.   HENT:      Right Ear: Tympanic membrane, ear canal and external ear normal.      Left Ear: Tympanic membrane, ear canal and external ear normal.      Nose: Nose normal.      Mouth/Throat:      Mouth: Mucous membranes are moist.      Pharynx: Oropharynx is clear.   Eyes:      Extraocular Movements: Extraocular movements intact.      Conjunctiva/sclera: Conjunctivae normal.      Pupils: Pupils are equal, round, and reactive to light.   Cardiovascular:      Rate and Rhythm: Normal rate and regular rhythm.      Pulses: Normal pulses.      Heart sounds: Normal heart sounds. No murmur heard.    No gallop.   Pulmonary:      Effort: Pulmonary effort is normal.      Breath sounds: Normal breath sounds. " No wheezing.   Abdominal:      General: Bowel sounds are normal. There is no distension.      Palpations: Abdomen is soft. There is no mass.      Tenderness: There is no abdominal tenderness. There is no guarding.   Musculoskeletal:         General: Normal range of motion.   Skin:     General: Skin is warm and dry.   Neurological:      Mental Status: She is alert. Mental status is at baseline.      Sensory: No sensory deficit.      Motor: No weakness.         Assessment:       ICD-10-CM ICD-9-CM   1. Vitamin D deficiency  E55.9 268.9   2. Increased PTH level  E34.9 259.9   3. Age-related osteoporosis without current pathological fracture  M81.0 733.01   4. Hypothyroidism, unspecified type  E03.9 244.9        Plan:     Problem List Items Addressed This Visit          Endocrine    Hypothyroidism (Chronic)     -stable   -well-controlled currently on levothyroxine 50 mcg daily, continue  -TSH for next visit         Increased PTH level     -most recent labs noted elevated   -does also have concurrently low vitamin-D level, likely secondary  hyperparathyroidism  -recently placed on vitamin-D 21442 units weekly within last 2 weeks  -repeat PTH, vitamin-D, and CMP in 2 months to evaluate if improvement now on vitamin-D replacement         Relevant Orders    PTH, Intact    Comprehensive Metabolic Panel    Vitamin D    Vitamin D deficiency - Primary       -does also have concurrently elevated PTH level, likely secondary  hyperparathyroidism  -recently placed on vitamin-D 76422 units weekly within last 2 weeks  -repeat PTH, vitamin-D, and CMP in 2 months to evaluate if improvement now on vitamin-D replacement         Relevant Orders    PTH, Intact    Comprehensive Metabolic Panel    Vitamin D       Orthopedic    Osteoporosis (Chronic)     -stable   -currently on Prolia injections, continue  -also on Femara currently               Follow up in about 4 months (around 11/27/2023) for with Dr. Cameron, Wellness with labs prior  to visit.   -plan specifics discussed above    Orders Placed This Encounter    PTH, Intact    Comprehensive Metabolic Panel    Vitamin D        Medication List with Changes/Refills   Current Medications    ATORVASTATIN (LIPITOR) 20 MG TABLET    TAKE 1 TABLET EVERY DAY    DENOSUMAB (PROLIA) 60 MG/ML SYRG    Inject 60 mg into the skin.    ERGOCALCIFEROL (ERGOCALCIFEROL) 50,000 UNIT CAP    Take 1 capsule (50,000 Units total) by mouth every 7 days.    FUROSEMIDE (LASIX) 20 MG TABLET    TAKE 1 TABLET EVERY DAY AS DIRECTED    HYDROCHLOROTHIAZIDE (HYDRODIURIL) 25 MG TABLET    TAKE 1 TABLET EVERY DAY    LETROZOLE (FEMARA) 2.5 MG TAB    Take 1 tablet (2.5 mg total) by mouth once daily.    LEVOTHYROXINE (SYNTHROID) 50 MCG TABLET    TAKE 1 TABLET BY MOUTH BEFORE BREAKFAST.    MELOXICAM (MOBIC) 7.5 MG TABLET    TAKE 1 TABLET EVERY DAY AS NEEDED FOR PAIN    METOPROLOL TARTRATE (LOPRESSOR) 25 MG TABLET    TAKE 1 TABLET TWICE DAILY    MULTIVIT-MIN/IRON/FOLIC/LUTEIN (CENTRUM SILVER WOMEN ORAL)    Take 1 tablet by mouth once daily.    POTASSIUM CHLORIDE (MICRO-K) 10 MEQ CPSR    Take 1 capsule (10 mEq total) by mouth once daily.   Discontinued Medications    LETROZOLE (FEMARA) 2.5 MG TAB    Take 1 tablet (2.5 mg total) by mouth once daily.

## 2023-07-27 NOTE — ASSESSMENT & PLAN NOTE
-most recent labs noted elevated   -does also have concurrently low vitamin-D level, likely secondary  hyperparathyroidism  -recently placed on vitamin-D 41443 units weekly within last 2 weeks  -repeat PTH, vitamin-D, and CMP in 2 months to evaluate if improvement now on vitamin-D replacement

## 2023-08-10 NOTE — PROGRESS NOTES
[facility] Breast Surg  Breast Surgical Oncology  Follow-Up Patient Office Visit       Referring Provider: No ref. provider found   PCP: Chely Cameron MD   Care Team:   Medical Oncologist: Judi Richards MD   Radiation Oncologist: No care team member to display   OBGYN: No data on file.     Chief Complaint:   Chief Complaint   Patient presents with    Follow-up     Patient is doing well      Subjective:     Treatment History:  Left Breast Cancer Dx in 2005:  1. Left Lumpectomy/SLNB  2. XRT  3. 5 years of Tamoxifen      Bilateral Breast Cancer Diagnosed in 2022  1. Bilateral Simple Mastectomy and Right [place] Lymph Node Biopsy 4/25/2022  2. Oncotype Dx RR 18, no overall benefit from chemotherapy  3. Endocrine therapy started 6/2022, expected for 5 years  4. [facility] Genetic Testing 5/17/2022 - Negative     Interval History:   08/15/2023 - Riana Pastrana returns today for 6 month follow up of breast cancer. She is doing well and has no breast/mastectomy concerns other than occasional tightness in across the chest wall after increasing her level of activity at home.     HPI:  Riana Pastrana initially presents on 03/29/22 at age 66 Years with a past history of left breast cancer diagnosed in 2005 SP lumpectomy/SLNB, XRT, and 5 years of Tamoxifen as well as recent mucinous cystosarcoma SP BSO diagnosed in 2017 who now presents with bilateral breast cancer. (1) AJCC 8th ed clinical anatomic stage IA / prognostic stage IA (cT1b cN0 M0) Right breast 3 o'clock subareolar invasive ductal carcinoma, grade 1, %,  %, HER2 0 - negative on IHC and Ki67 58%. (2) AJCC 8th ed clinical anatomic stage 0 / prognostic stage 0 (cTis cN0 M0) Left breast central posterior depth ductal carcinoma in situ, grade 1, % and %.     She is s/p bilateral simple mastectomy and right sentinel lymph node biopsy. Surgical pathology of the right breast revealed invasive  ductal carcinoma grade 1 measuring 4 mm. Margins negative. Four right sentinel lymph nodes are negative for malignancy (0/4). Surgical pathology of the left breast revealed central region/6:00 biopsy site with diminutive focus of residual low grade DCIS. Margins also negative.  She has started endocrine therapy and had genetic testing which was negative.    A detailed patient history was obtained and reviewed.     Imagin. 2022 BL SC MG at [facility]-which revealed on R MG lower inner quadrant anterior depth, there are two focal asymmetries. On L MG central region posterior depth there are indeterminate calcifications. At 12 o'clock left breast middle depth, there is a focal asymmetry with calcifications. There are stable post-therapy changes of the left breast. No detrimental change at the lumpectomy bed. There are stable post-core needle biopsy changes and a jesús shaped clip in the right breast. No detrimental change at this biopsy site. No other significant masses, calcifications, or other findings are seen in either breast. BIRADS-0 ; additional imaging needed.    2. 2022 BL DG MG/ BL US BREAST LIMITED at [facility]- which revealed on R MG at 4 o'clock subareolar anterior there is a 0.7 cm oval, circumscribed mass. On L MG 12 o'clock, anterior depth, there is a 1.2 cm focal asymmetry with associated heterogeneous calcifications. These findings correspond to the areas recalled on the screening examination dated 2022. No other suspicious masses, calcifications, or other signs of malignancy are identified. On R US, at 3 o'clock subareolar there is a 0.6 x 0.4 x 0.4 cm oval, hypoechoic mass with indistinct margins. At 4 o'clock subareolar right breast, there is a 0.7 x 0.3 x 0.6 cm benign, simple cyst. These correspond to the mammographic findings in these regions. No other suspicious sonographic abnormality is seen. Specifically, no suspicious sonographic correlate is seen for the focal asymmetry in the 12  o'clock left breast. Benign-appearing lymph nodes are noted in the bilateral axillae. BIRADS-4 suspicious; biopsy recommended.     Pathology:  1. 3/16/2022 Ultrasound-guided Core Needle Biopsy Right Breast 3 o'clock Subareolar Mass - Invasive ductal carcinoma, grade 1  %  %  HER2 Negative  Ki67 - 58% High    2.3/16/2022 Stereotactic guided Core Needle Biopsy Left Breast Posterior Depth Amorphous Grouped Calcifications Central Region - Ductal carcinoma in situ, grade 1  %  MA 99%    3. 3/16/2022 Stereotactic guided Core Needle Biopsy Left Breast 12 o'clock Anterior Depth Focal Asymmetry with Heterogenous Calcifications - Sclerotic fibroadenomatoid nodule with dystropic calcification     4. 4/25/2022 Bilateral Simple Mastectomy and Right SLNB - Surgical pathology of the right breast revealed invasive ductal carcinoma grade 1 measuring 4 mm. Margins negative. Four right sentinel lymph nodes are negative for malignancy (0/4). Surgical pathology of the left breast revealed central region/6:00 biopsy site with diminutive focus of residual low grade DCIS. Margins also negative.      OB/GYN History:  Menarche Onset: 13  Menopause: Post, at age:  Hormonal birth control (duration): yes  Pregnancies: 3  Age at first pregnancy: ?  Child births: 2  Hysterectomy: yes, partial hysterectomy at age 29  Oophorectomy: yes, 2017 after diagnosis of adnexal mass suspicious - final pathology after BSO was mucinous cystosarcoma  HRT: no     Other:  # of breast biopsies (when and pathology results): 1 Left breast cancer in 2005  MG breast density: Category B (Scattered fibroglandular)  Prior thoracic RT: none  Genetic testing: none  Ashkenazi Adventism descent: No    Family History:  Family History   Problem Relation Age of Onset    Ovarian cancer Sister 50        Patient History:  Past Medical History:   Diagnosis Date    History of bilateral breast cancer     left in 2007; bilateral in 2022    Hyperlipidemia 5/13/2022     Hypertension     Hypothyroidism 5/13/2022    Severe obesity (BMI 35.0-39.9) with comorbidity 5/13/2022       Past Surgical History:   Procedure Laterality Date    BREAST BIOPSY      BREAST SURGERY      HYSTERECTOMY      TONSILLECTOMY         Social History     Socioeconomic History    Marital status:    Tobacco Use    Smoking status: Never    Smokeless tobacco: Never   Substance and Sexual Activity    Alcohol use: Never    Drug use: Never     Social Determinants of Health     Financial Resource Strain: Low Risk  (7/27/2023)    Overall Financial Resource Strain (CARDIA)     Difficulty of Paying Living Expenses: Not hard at all   Food Insecurity: No Food Insecurity (7/27/2023)    Hunger Vital Sign     Worried About Running Out of Food in the Last Year: Never true     Ran Out of Food in the Last Year: Never true   Transportation Needs: No Transportation Needs (7/27/2023)    PRAPARE - Transportation     Lack of Transportation (Medical): No     Lack of Transportation (Non-Medical): No   Physical Activity: Sufficiently Active (7/27/2023)    Exercise Vital Sign     Days of Exercise per Week: 7 days     Minutes of Exercise per Session: 60 min   Stress: No Stress Concern Present (7/27/2023)    [employer] - Occupational Stress Questionnaire     Feeling of Stress : Not at all   Social Connections: Socially Integrated (7/27/2023)    Social Connection and Isolation Panel [NHANES]     Frequency of Communication with Friends and Family: More than three times a week     Frequency of Social Gatherings with Friends and Family: More than three times a week     Attends Rastafarian Services: More than 4 times per year     Active Member of Clubs or Organizations: Yes     Attends Club or Organization Meetings: More than 4 times per year     Marital Status:    Housing Stability: Unknown (7/27/2023)    Housing Stability Vital Sign     Unable to Pay for Housing in the Last Year: No     Unstable  Housing in the Last Year: No       Immunization History   Administered Date(s) Administered    COVID-19, MRNA, LN-S, PF (Pfizer) (Purple Cap) 02/25/2021, 03/19/2021, 11/01/2021    COVID-19, mRNA, LNP-S, bivalent booster, PF (PFIZER OMICRON) 10/26/2022    Influenza - Quadrivalent - High Dose - PF (65 years and older) 11/01/2021, 10/26/2022    Influenza - Quadrivalent - PF (6-35 months) 10/01/2018    Influenza - Quadrivalent - PF *Preferred* (6 months and older) 11/07/2019    Pneumococcal Conjugate - 13 Valent 11/12/2021    Pneumococcal Polysaccharide - 23 Valent 11/23/2022       Medications/Allergies:  Current Outpatient Medications on File Prior to Visit   Medication Sig Dispense Refill    atorvastatin (LIPITOR) 20 MG tablet TAKE 1 TABLET EVERY DAY 90 tablet 2    denosumab (PROLIA) 60 mg/mL Syrg Inject 60 mg into the skin.      ergocalciferol (ERGOCALCIFEROL) 50,000 unit Cap Take 1 capsule (50,000 Units total) by mouth every 7 days. 12 capsule 1    furosemide (LASIX) 20 MG tablet TAKE 1 TABLET EVERY DAY AS DIRECTED 90 tablet 3    hydroCHLOROthiazide (HYDRODIURIL) 25 MG tablet TAKE 1 TABLET EVERY DAY 90 tablet 2    letrozole (FEMARA) 2.5 mg Tab Take 1 tablet (2.5 mg total) by mouth once daily. 30 tablet 5    levothyroxine (SYNTHROID) 50 MCG tablet TAKE 1 TABLET BY MOUTH BEFORE BREAKFAST. 90 tablet 1    meloxicam (MOBIC) 7.5 MG tablet TAKE 1 TABLET EVERY DAY AS NEEDED FOR PAIN 30 tablet 3    metoprolol tartrate (LOPRESSOR) 25 MG tablet TAKE 1 TABLET TWICE DAILY 180 tablet 1    multivit-min/iron/folic/lutein (CENTRUM SILVER WOMEN ORAL) Take 1 tablet by mouth once daily.      potassium chloride (MICRO-K) 10 MEQ CpSR Take 1 capsule (10 mEq total) by mouth once daily. 90 capsule 1     No current facility-administered medications on file prior to visit.       Review of patient's allergies indicates:   Allergen Reactions    Penicillins Other (See Comments)     UNKNOWN REACTION. ALLERGY SINCE CHILDHOOD         Review of  Systems:  Pertinent items are noted in HPI.     Objective:     Vitals:  Vitals:    08/15/23 0812   BP: (!) 146/84   Pulse: 77   Resp: 18   Temp: 97.5 °F (36.4 °C)           Body mass index is 38.42 kg/m².     Physical Exam:  General: The patient is awake, alert and oriented times three. The patient is well nourished and in no acute distress.  Neck: There is no evidence of palpable cervical, supraclavicular or axillary adenopathy. The neck is supple. The thyroid is not enlarged.  Musculoskeletal: The patient has a normal range of motion of her bilateral upper extremities.  Chest: Examination of the chest wall fails to reveal any obvious abnormalities. Nonlabored breathing, symmetric expansion.  Breast: Examination of the Mastectomy sites bilaterally fails to reveal any dominant masses or areas of significant focal nodularity. The nipples are surgically absent bilaterally. There are no significant skin changes overlying the breasts. There is no skin dimpling with movement of the pectoralis.  Abdomen: The abdomen is soft, flat, nontender and nondistended.  Integumentary: no rashes or skin lesions present  Neurologic: cranial nerves intact, no signs of peripheral neurological deficit, motor/sensory function intact      Assessment and Plan:       There are no diagnoses linked to this encounter.          Plan:       Healthy lifestyle guidelines were reviewed. She was encouraged to engage in regular exercise, maintain a healthy body weight, and avoid excessive alcohol consumption. Healthy nutritional guidelines were also discussed. Self-breast examination was reviewed with the patient in detail and she was encouraged to perform this on a monthly basis.    Continue endocrine therapy and follow up with medical oncology.    RTC in 6 months for CBE.    Muscle tightness - Patient was advised to perform stretching and strengthening exercises to reduce post-op limitations in range of motion, improve pain in the chest wall,  shoulders, neck, and back, and return to activities of daily living faster. Exercises - 5-10 reps, 1-2 times a day, every day, until full range of motion is regained.  Breathe deeply and often as exercises are performed. Exercises discussed include: wall climbing (side and front), side bends, shoulder circles, shoulder blade squeezes, arm lifts. Exercise documents are found in patient binder.        All of her questions were answered. She was advised to call if she develops any questions or concerns.    Sirena eLe PA-C              Total time on the date of the visit ranged from 30-39 mins ([id number]). Total time includes both face-to-face and non-face-to-face time personally spent by myself on the day of the visit.    Non-face-to-face time included:  _X_ preparing to see the patient such as reviewing the patient record  __ obtaining and reviewing separately obtained history  _X_ independently interpreting results  _X_ documenting clinical information in electronic health record.    Face-to-face time included:  _X_ performing an appropriate history and examination  _X_ communicating results to the patient  _X_ counseling and educating the patient  __ ordering appropriate medications  __ ordering appropriate tests  _X_ ordering appropriate procedures (including follow-up)  _X_ answering any questions the patient had    Total Time spent on date of visit: 35 minutes

## 2023-08-15 ENCOUNTER — OFFICE VISIT (OUTPATIENT)
Dept: SURGERY | Facility: CLINIC | Age: 68
End: 2023-08-15
Payer: MEDICARE

## 2023-08-15 VITALS
WEIGHT: 223.81 LBS | SYSTOLIC BLOOD PRESSURE: 146 MMHG | RESPIRATION RATE: 18 BRPM | OXYGEN SATURATION: 95 % | HEART RATE: 77 BPM | HEIGHT: 64 IN | TEMPERATURE: 98 F | BODY MASS INDEX: 38.21 KG/M2 | DIASTOLIC BLOOD PRESSURE: 84 MMHG

## 2023-08-15 DIAGNOSIS — Z85.43 PERSONAL HISTORY OF OVARIAN CANCER: ICD-10-CM

## 2023-08-15 DIAGNOSIS — Z85.3 PERSONAL HISTORY OF BREAST CANCER: ICD-10-CM

## 2023-08-15 DIAGNOSIS — D05.12 DUCTAL CARCINOMA IN SITU (DCIS) OF LEFT BREAST: ICD-10-CM

## 2023-08-15 DIAGNOSIS — Z17.0 MALIGNANT NEOPLASM OF CENTRAL PORTION OF RIGHT BREAST IN FEMALE, ESTROGEN RECEPTOR POSITIVE: Primary | ICD-10-CM

## 2023-08-15 DIAGNOSIS — Z90.13 HISTORY OF BILATERAL MASTECTOMY: ICD-10-CM

## 2023-08-15 DIAGNOSIS — C50.111 MALIGNANT NEOPLASM OF CENTRAL PORTION OF RIGHT BREAST IN FEMALE, ESTROGEN RECEPTOR POSITIVE: Primary | ICD-10-CM

## 2023-08-15 PROCEDURE — 3077F PR MOST RECENT SYSTOLIC BLOOD PRESSURE >= 140 MM HG: ICD-10-PCS | Mod: CPTII,S$GLB,, | Performed by: PHYSICIAN ASSISTANT

## 2023-08-15 PROCEDURE — 1160F RVW MEDS BY RX/DR IN RCRD: CPT | Mod: CPTII,S$GLB,, | Performed by: PHYSICIAN ASSISTANT

## 2023-08-15 PROCEDURE — 3008F PR BODY MASS INDEX (BMI) DOCUMENTED: ICD-10-PCS | Mod: CPTII,S$GLB,, | Performed by: PHYSICIAN ASSISTANT

## 2023-08-15 PROCEDURE — 99999 PR PBB SHADOW E&M-EST. PATIENT-LVL IV: CPT | Mod: PBBFAC,,, | Performed by: PHYSICIAN ASSISTANT

## 2023-08-15 PROCEDURE — 3079F DIAST BP 80-89 MM HG: CPT | Mod: CPTII,S$GLB,, | Performed by: PHYSICIAN ASSISTANT

## 2023-08-15 PROCEDURE — 3008F BODY MASS INDEX DOCD: CPT | Mod: CPTII,S$GLB,, | Performed by: PHYSICIAN ASSISTANT

## 2023-08-15 PROCEDURE — 1159F MED LIST DOCD IN RCRD: CPT | Mod: CPTII,S$GLB,, | Performed by: PHYSICIAN ASSISTANT

## 2023-08-15 PROCEDURE — 1126F AMNT PAIN NOTED NONE PRSNT: CPT | Mod: CPTII,S$GLB,, | Performed by: PHYSICIAN ASSISTANT

## 2023-08-15 PROCEDURE — 99214 OFFICE O/P EST MOD 30 MIN: CPT | Mod: S$GLB,,, | Performed by: PHYSICIAN ASSISTANT

## 2023-08-15 PROCEDURE — 1126F PR PAIN SEVERITY QUANTIFIED, NO PAIN PRESENT: ICD-10-PCS | Mod: CPTII,S$GLB,, | Performed by: PHYSICIAN ASSISTANT

## 2023-08-15 PROCEDURE — 1160F PR REVIEW ALL MEDS BY PRESCRIBER/CLIN PHARMACIST DOCUMENTED: ICD-10-PCS | Mod: CPTII,S$GLB,, | Performed by: PHYSICIAN ASSISTANT

## 2023-08-15 PROCEDURE — 99214 PR OFFICE/OUTPT VISIT, EST, LEVL IV, 30-39 MIN: ICD-10-PCS | Mod: S$GLB,,, | Performed by: PHYSICIAN ASSISTANT

## 2023-08-15 PROCEDURE — 3077F SYST BP >= 140 MM HG: CPT | Mod: CPTII,S$GLB,, | Performed by: PHYSICIAN ASSISTANT

## 2023-08-15 PROCEDURE — 3079F PR MOST RECENT DIASTOLIC BLOOD PRESSURE 80-89 MM HG: ICD-10-PCS | Mod: CPTII,S$GLB,, | Performed by: PHYSICIAN ASSISTANT

## 2023-08-15 PROCEDURE — 99999 PR PBB SHADOW E&M-EST. PATIENT-LVL IV: ICD-10-PCS | Mod: PBBFAC,,, | Performed by: PHYSICIAN ASSISTANT

## 2023-08-15 PROCEDURE — 1159F PR MEDICATION LIST DOCUMENTED IN MEDICAL RECORD: ICD-10-PCS | Mod: CPTII,S$GLB,, | Performed by: PHYSICIAN ASSISTANT

## 2023-08-30 DIAGNOSIS — Z79.899 ON POTASSIUM WASTING DIURETIC THERAPY: ICD-10-CM

## 2023-08-30 RX ORDER — POTASSIUM CHLORIDE 750 MG/1
10 CAPSULE, EXTENDED RELEASE ORAL
Qty: 90 CAPSULE | Refills: 3 | Status: SHIPPED | OUTPATIENT
Start: 2023-08-30

## 2023-09-27 ENCOUNTER — LAB VISIT (OUTPATIENT)
Dept: LAB | Facility: HOSPITAL | Age: 68
End: 2023-09-27
Payer: MEDICARE

## 2023-09-27 DIAGNOSIS — R79.89 INCREASED PTH LEVEL: ICD-10-CM

## 2023-09-27 DIAGNOSIS — E55.9 VITAMIN D DEFICIENCY: ICD-10-CM

## 2023-09-27 LAB
ALBUMIN SERPL-MCNC: 4.2 G/DL (ref 3.4–4.8)
ALBUMIN/GLOB SERPL: 1.3 RATIO (ref 1.1–2)
ALP SERPL-CCNC: 94 UNIT/L (ref 40–150)
ALT SERPL-CCNC: 34 UNIT/L (ref 0–55)
AST SERPL-CCNC: 41 UNIT/L (ref 5–34)
BILIRUB SERPL-MCNC: 0.9 MG/DL
BUN SERPL-MCNC: 12.5 MG/DL (ref 9.8–20.1)
CALCIUM SERPL-MCNC: 10.2 MG/DL (ref 8.4–10.2)
CHLORIDE SERPL-SCNC: 99 MMOL/L (ref 98–107)
CO2 SERPL-SCNC: 31 MMOL/L (ref 23–31)
CREAT SERPL-MCNC: 0.76 MG/DL (ref 0.55–1.02)
DEPRECATED CALCIDIOL+CALCIFEROL SERPL-MC: 28.5 NG/ML (ref 30–80)
GFR SERPLBLD CREATININE-BSD FMLA CKD-EPI: >60 MLS/MIN/1.73/M2
GLOBULIN SER-MCNC: 3.3 GM/DL (ref 2.4–3.5)
GLUCOSE SERPL-MCNC: 130 MG/DL (ref 82–115)
POTASSIUM SERPL-SCNC: 3.2 MMOL/L (ref 3.5–5.1)
PROT SERPL-MCNC: 7.5 GM/DL (ref 5.8–7.6)
PTH-INTACT SERPL-MCNC: 57.6 PG/ML (ref 8.7–77)
SODIUM SERPL-SCNC: 141 MMOL/L (ref 136–145)

## 2023-09-27 PROCEDURE — 36415 COLL VENOUS BLD VENIPUNCTURE: CPT

## 2023-09-27 PROCEDURE — 80053 COMPREHEN METABOLIC PANEL: CPT

## 2023-09-27 PROCEDURE — 83970 ASSAY OF PARATHORMONE: CPT

## 2023-09-27 PROCEDURE — 82306 VITAMIN D 25 HYDROXY: CPT

## 2023-09-28 ENCOUNTER — TELEPHONE (OUTPATIENT)
Dept: INTERNAL MEDICINE | Facility: CLINIC | Age: 68
End: 2023-09-28
Payer: MEDICARE

## 2023-09-28 NOTE — PROGRESS NOTES
Repeat Vitamin-D level slightly improved, please confirm she is taking vitamin-D prescription.  May need to increase dose.  Repeat PTH level has returned to normal.

## 2023-09-28 NOTE — TELEPHONE ENCOUNTER
----- Message from KYLE Waldrop sent at 9/28/2023  7:02 AM CDT -----  Repeat Vitamin-D level slightly improved, please confirm she is taking vitamin-D prescription.  May need to increase dose.  Repeat PTH level has returned to normal.

## 2023-09-28 NOTE — TELEPHONE ENCOUNTER
----- Message from KYLE Waldrop sent at 9/28/2023  2:55 PM CDT -----  Okay, we will go ahead and increase her vitamin-D from once weekly to twice weekly.

## 2023-10-17 ENCOUNTER — INFUSION (OUTPATIENT)
Dept: INFUSION THERAPY | Facility: HOSPITAL | Age: 68
End: 2023-10-17
Attending: INTERNAL MEDICINE
Payer: MEDICARE

## 2023-10-17 VITALS
TEMPERATURE: 98 F | SYSTOLIC BLOOD PRESSURE: 145 MMHG | DIASTOLIC BLOOD PRESSURE: 83 MMHG | OXYGEN SATURATION: 96 % | HEART RATE: 78 BPM | RESPIRATION RATE: 16 BRPM

## 2023-10-17 DIAGNOSIS — Z79.811 USE OF LETROZOLE (FEMARA): ICD-10-CM

## 2023-10-17 DIAGNOSIS — M81.0 OSTEOPOROSIS, UNSPECIFIED OSTEOPOROSIS TYPE, UNSPECIFIED PATHOLOGICAL FRACTURE PRESENCE: Primary | ICD-10-CM

## 2023-10-17 DIAGNOSIS — C50.211 MALIGNANT NEOPLASM OF UPPER-INNER QUADRANT OF RIGHT BREAST IN FEMALE, ESTROGEN RECEPTOR POSITIVE: ICD-10-CM

## 2023-10-17 DIAGNOSIS — Z17.0 MALIGNANT NEOPLASM OF UPPER-INNER QUADRANT OF RIGHT BREAST IN FEMALE, ESTROGEN RECEPTOR POSITIVE: ICD-10-CM

## 2023-10-17 PROCEDURE — 96372 THER/PROPH/DIAG INJ SC/IM: CPT

## 2023-10-17 PROCEDURE — 63600175 PHARM REV CODE 636 W HCPCS: Mod: JZ,JG | Performed by: NURSE PRACTITIONER

## 2023-10-17 RX ADMIN — DENOSUMAB 60 MG: 60 INJECTION SUBCUTANEOUS at 09:10

## 2023-10-31 ENCOUNTER — TELEPHONE (OUTPATIENT)
Dept: INTERNAL MEDICINE | Facility: CLINIC | Age: 68
End: 2023-10-31
Payer: MEDICARE

## 2023-10-31 DIAGNOSIS — N25.81 SECONDARY HYPERPARATHYROIDISM: ICD-10-CM

## 2023-10-31 DIAGNOSIS — E55.9 VITAMIN D INSUFFICIENCY: ICD-10-CM

## 2023-10-31 RX ORDER — ERGOCALCIFEROL 1.25 MG/1
50000 CAPSULE ORAL
Qty: 12 CAPSULE | Refills: 3 | Status: SHIPPED | OUTPATIENT
Start: 2023-10-31 | End: 2023-12-08 | Stop reason: SDUPTHER

## 2023-10-31 NOTE — TELEPHONE ENCOUNTER
----- Message from Savannah Marshall sent at 10/31/2023 10:23 AM CDT -----  Regarding: refill  .Type:  RX Refill Request    Who Called: pt  Refill or New Rx:refill  RX Name and Strength:  How is the patient currently taking it? (ex. 1XDay):  Is this a 30 day or 90 day RX:  Preferred Pharmacy with phone number:  Local or Mail Order:[facility] in [place]  Ordering Provider:  Would the patient rather a call back or a response via [facility]?   Best Call Back Number:  [phone]  Additional Information: vitamin D        burning right calf, ho dvt

## 2023-11-18 DIAGNOSIS — R52 PAIN: ICD-10-CM

## 2023-11-20 RX ORDER — MELOXICAM 7.5 MG/1
TABLET ORAL
Qty: 30 TABLET | Refills: 10 | Status: SHIPPED | OUTPATIENT
Start: 2023-11-20

## 2023-11-22 DIAGNOSIS — E87.6 HYPOKALEMIA: ICD-10-CM

## 2023-11-22 DIAGNOSIS — Z13.0 SCREENING FOR ENDOCRINE, NUTRITIONAL, METABOLIC AND IMMUNITY DISORDER: ICD-10-CM

## 2023-11-22 DIAGNOSIS — Z79.899 ENCOUNTER FOR LONG-TERM (CURRENT) USE OF MEDICATIONS: ICD-10-CM

## 2023-11-22 DIAGNOSIS — Z13.21 SCREENING FOR ENDOCRINE, NUTRITIONAL, METABOLIC AND IMMUNITY DISORDER: ICD-10-CM

## 2023-11-22 DIAGNOSIS — E55.9 VITAMIN D DEFICIENCY: ICD-10-CM

## 2023-11-22 DIAGNOSIS — E78.2 MIXED HYPERLIPIDEMIA: ICD-10-CM

## 2023-11-22 DIAGNOSIS — Z13.83 SCREENING FOR CARDIOVASCULAR, RESPIRATORY, AND GENITOURINARY DISEASES: ICD-10-CM

## 2023-11-22 DIAGNOSIS — Z13.89 SCREENING FOR CARDIOVASCULAR, RESPIRATORY, AND GENITOURINARY DISEASES: ICD-10-CM

## 2023-11-22 DIAGNOSIS — E03.9 HYPOTHYROIDISM, UNSPECIFIED TYPE: ICD-10-CM

## 2023-11-22 DIAGNOSIS — I48.91 ATRIAL FIBRILLATION, UNSPECIFIED TYPE: ICD-10-CM

## 2023-11-22 DIAGNOSIS — Z13.29 SCREENING FOR ENDOCRINE, NUTRITIONAL, METABOLIC AND IMMUNITY DISORDER: ICD-10-CM

## 2023-11-22 DIAGNOSIS — Z13.228 SCREENING FOR ENDOCRINE, NUTRITIONAL, METABOLIC AND IMMUNITY DISORDER: ICD-10-CM

## 2023-11-22 DIAGNOSIS — E55.9 VITAMIN D INSUFFICIENCY: Primary | ICD-10-CM

## 2023-11-22 DIAGNOSIS — I10 PRIMARY HYPERTENSION: ICD-10-CM

## 2023-11-22 DIAGNOSIS — N25.81 SECONDARY HYPERPARATHYROIDISM: ICD-10-CM

## 2023-11-22 DIAGNOSIS — Z13.6 SCREENING FOR CARDIOVASCULAR, RESPIRATORY, AND GENITOURINARY DISEASES: ICD-10-CM

## 2023-12-04 ENCOUNTER — LAB VISIT (OUTPATIENT)
Dept: LAB | Facility: HOSPITAL | Age: 68
End: 2023-12-04
Attending: INTERNAL MEDICINE
Payer: MEDICARE

## 2023-12-04 DIAGNOSIS — Z13.89 SCREENING FOR CARDIOVASCULAR, RESPIRATORY, AND GENITOURINARY DISEASES: ICD-10-CM

## 2023-12-04 DIAGNOSIS — N25.81 SECONDARY HYPERPARATHYROIDISM: ICD-10-CM

## 2023-12-04 DIAGNOSIS — I10 PRIMARY HYPERTENSION: ICD-10-CM

## 2023-12-04 DIAGNOSIS — E78.2 MIXED HYPERLIPIDEMIA: ICD-10-CM

## 2023-12-04 DIAGNOSIS — Z13.83 SCREENING FOR CARDIOVASCULAR, RESPIRATORY, AND GENITOURINARY DISEASES: ICD-10-CM

## 2023-12-04 DIAGNOSIS — I48.91 ATRIAL FIBRILLATION, UNSPECIFIED TYPE: ICD-10-CM

## 2023-12-04 DIAGNOSIS — Z13.228 SCREENING FOR ENDOCRINE, NUTRITIONAL, METABOLIC AND IMMUNITY DISORDER: ICD-10-CM

## 2023-12-04 DIAGNOSIS — E55.9 VITAMIN D DEFICIENCY: ICD-10-CM

## 2023-12-04 DIAGNOSIS — E87.6 HYPOKALEMIA: ICD-10-CM

## 2023-12-04 DIAGNOSIS — Z13.6 SCREENING FOR CARDIOVASCULAR, RESPIRATORY, AND GENITOURINARY DISEASES: ICD-10-CM

## 2023-12-04 DIAGNOSIS — Z79.899 ENCOUNTER FOR LONG-TERM (CURRENT) USE OF MEDICATIONS: ICD-10-CM

## 2023-12-04 DIAGNOSIS — Z13.21 SCREENING FOR ENDOCRINE, NUTRITIONAL, METABOLIC AND IMMUNITY DISORDER: ICD-10-CM

## 2023-12-04 DIAGNOSIS — E03.9 HYPOTHYROIDISM, UNSPECIFIED TYPE: ICD-10-CM

## 2023-12-04 DIAGNOSIS — Z13.0 SCREENING FOR ENDOCRINE, NUTRITIONAL, METABOLIC AND IMMUNITY DISORDER: ICD-10-CM

## 2023-12-04 DIAGNOSIS — Z13.29 SCREENING FOR ENDOCRINE, NUTRITIONAL, METABOLIC AND IMMUNITY DISORDER: ICD-10-CM

## 2023-12-04 LAB
ALBUMIN SERPL-MCNC: 4.1 G/DL (ref 3.4–4.8)
ALBUMIN/GLOB SERPL: 1.2 RATIO (ref 1.1–2)
ALP SERPL-CCNC: 112 UNIT/L (ref 40–150)
ALT SERPL-CCNC: 39 UNIT/L (ref 0–55)
AST SERPL-CCNC: 36 UNIT/L (ref 5–34)
BASOPHILS # BLD AUTO: 0.07 X10(3)/MCL
BASOPHILS NFR BLD AUTO: 1.1 %
BILIRUB SERPL-MCNC: 0.8 MG/DL
BUN SERPL-MCNC: 13.6 MG/DL (ref 9.8–20.1)
CALCIUM SERPL-MCNC: 9.8 MG/DL (ref 8.4–10.2)
CHLORIDE SERPL-SCNC: 99 MMOL/L (ref 98–107)
CHOLEST SERPL-MCNC: 175 MG/DL
CHOLEST/HDLC SERPL: 3 {RATIO} (ref 0–5)
CO2 SERPL-SCNC: 33 MMOL/L (ref 23–31)
CREAT SERPL-MCNC: 0.74 MG/DL (ref 0.55–1.02)
EOSINOPHIL # BLD AUTO: 0.09 X10(3)/MCL (ref 0–0.9)
EOSINOPHIL NFR BLD AUTO: 1.4 %
ERYTHROCYTE [DISTWIDTH] IN BLOOD BY AUTOMATED COUNT: 12.8 % (ref 11.5–17)
GFR SERPLBLD CREATININE-BSD FMLA CKD-EPI: >60 MLS/MIN/1.73/M2
GLOBULIN SER-MCNC: 3.5 GM/DL (ref 2.4–3.5)
GLUCOSE SERPL-MCNC: 135 MG/DL (ref 82–115)
HCT VFR BLD AUTO: 43.3 % (ref 37–47)
HDLC SERPL-MCNC: 62 MG/DL (ref 35–60)
HGB BLD-MCNC: 14.2 G/DL (ref 12–16)
IMM GRANULOCYTES # BLD AUTO: 0.03 X10(3)/MCL (ref 0–0.04)
IMM GRANULOCYTES NFR BLD AUTO: 0.5 %
LDLC SERPL CALC-MCNC: 98 MG/DL (ref 50–140)
LYMPHOCYTES # BLD AUTO: 1.72 X10(3)/MCL (ref 0.6–4.6)
LYMPHOCYTES NFR BLD AUTO: 27.6 %
MCH RBC QN AUTO: 30.9 PG (ref 27–31)
MCHC RBC AUTO-ENTMCNC: 32.8 G/DL (ref 33–36)
MCV RBC AUTO: 94.3 FL (ref 80–94)
MONOCYTES # BLD AUTO: 0.55 X10(3)/MCL (ref 0.1–1.3)
MONOCYTES NFR BLD AUTO: 8.8 %
NEUTROPHILS # BLD AUTO: 3.77 X10(3)/MCL (ref 2.1–9.2)
NEUTROPHILS NFR BLD AUTO: 60.6 %
NRBC BLD AUTO-RTO: 0 %
PLATELET # BLD AUTO: 203 X10(3)/MCL (ref 130–400)
PMV BLD AUTO: 10.9 FL (ref 7.4–10.4)
POTASSIUM SERPL-SCNC: 3.7 MMOL/L (ref 3.5–5.1)
PROT SERPL-MCNC: 7.6 GM/DL (ref 5.8–7.6)
PTH-INTACT SERPL-MCNC: 111.3 PG/ML (ref 8.7–77)
RBC # BLD AUTO: 4.59 X10(6)/MCL (ref 4.2–5.4)
SODIUM SERPL-SCNC: 143 MMOL/L (ref 136–145)
TRIGL SERPL-MCNC: 76 MG/DL (ref 37–140)
TSH SERPL-ACNC: 1.72 UIU/ML (ref 0.35–4.94)
VLDLC SERPL CALC-MCNC: 15 MG/DL
WBC # SPEC AUTO: 6.23 X10(3)/MCL (ref 4.5–11.5)

## 2023-12-04 PROCEDURE — 84480 ASSAY TRIIODOTHYRONINE (T3): CPT

## 2023-12-04 PROCEDURE — 36415 COLL VENOUS BLD VENIPUNCTURE: CPT

## 2023-12-04 PROCEDURE — 85025 COMPLETE CBC W/AUTO DIFF WBC: CPT

## 2023-12-04 PROCEDURE — 83970 ASSAY OF PARATHORMONE: CPT

## 2023-12-04 PROCEDURE — 80061 LIPID PANEL: CPT

## 2023-12-04 PROCEDURE — 80053 COMPREHEN METABOLIC PANEL: CPT

## 2023-12-04 PROCEDURE — 84443 ASSAY THYROID STIM HORMONE: CPT

## 2023-12-05 LAB — T3 SERPL IA-MCNC: 96 NG/DL (ref 80–200)

## 2023-12-08 ENCOUNTER — OFFICE VISIT (OUTPATIENT)
Dept: INTERNAL MEDICINE | Facility: CLINIC | Age: 68
End: 2023-12-08
Payer: MEDICARE

## 2023-12-08 VITALS
HEART RATE: 68 BPM | WEIGHT: 218 LBS | BODY MASS INDEX: 37.22 KG/M2 | OXYGEN SATURATION: 96 % | DIASTOLIC BLOOD PRESSURE: 72 MMHG | TEMPERATURE: 98 F | HEIGHT: 64 IN | SYSTOLIC BLOOD PRESSURE: 132 MMHG

## 2023-12-08 DIAGNOSIS — E55.9 VITAMIN D INSUFFICIENCY: ICD-10-CM

## 2023-12-08 DIAGNOSIS — M81.0 AGE-RELATED OSTEOPOROSIS WITHOUT CURRENT PATHOLOGICAL FRACTURE: Chronic | ICD-10-CM

## 2023-12-08 DIAGNOSIS — I10 PRIMARY HYPERTENSION: Chronic | ICD-10-CM

## 2023-12-08 DIAGNOSIS — I48.91 ATRIAL FIBRILLATION, UNSPECIFIED TYPE: ICD-10-CM

## 2023-12-08 DIAGNOSIS — E03.9 ACQUIRED HYPOTHYROIDISM: Chronic | ICD-10-CM

## 2023-12-08 DIAGNOSIS — R73.09 IMPAIRED GLUCOSE REGULATION: Primary | ICD-10-CM

## 2023-12-08 DIAGNOSIS — E78.2 MIXED HYPERLIPIDEMIA: Chronic | ICD-10-CM

## 2023-12-08 DIAGNOSIS — Z00.00 MEDICARE ANNUAL WELLNESS VISIT, SUBSEQUENT: ICD-10-CM

## 2023-12-08 DIAGNOSIS — N25.81 SECONDARY HYPERPARATHYROIDISM: ICD-10-CM

## 2023-12-08 DIAGNOSIS — I73.9 INTERMITTENT CLAUDICATION: ICD-10-CM

## 2023-12-08 DIAGNOSIS — E66.01 SEVERE OBESITY (BMI 35.0-39.9) WITH COMORBIDITY: Chronic | ICD-10-CM

## 2023-12-08 PROCEDURE — 1159F PR MEDICATION LIST DOCUMENTED IN MEDICAL RECORD: ICD-10-PCS | Mod: CPTII,,, | Performed by: INTERNAL MEDICINE

## 2023-12-08 PROCEDURE — G0439 PR MEDICARE ANNUAL WELLNESS SUBSEQUENT VISIT: ICD-10-PCS | Mod: ,,, | Performed by: INTERNAL MEDICINE

## 2023-12-08 PROCEDURE — 1126F AMNT PAIN NOTED NONE PRSNT: CPT | Mod: CPTII,,, | Performed by: INTERNAL MEDICINE

## 2023-12-08 PROCEDURE — 3078F DIAST BP <80 MM HG: CPT | Mod: CPTII,,, | Performed by: INTERNAL MEDICINE

## 2023-12-08 PROCEDURE — 3288F PR FALLS RISK ASSESSMENT DOCUMENTED: ICD-10-PCS | Mod: CPTII,,, | Performed by: INTERNAL MEDICINE

## 2023-12-08 PROCEDURE — 1159F MED LIST DOCD IN RCRD: CPT | Mod: CPTII,,, | Performed by: INTERNAL MEDICINE

## 2023-12-08 PROCEDURE — G0439 PPPS, SUBSEQ VISIT: HCPCS | Mod: ,,, | Performed by: INTERNAL MEDICINE

## 2023-12-08 PROCEDURE — 1101F PT FALLS ASSESS-DOCD LE1/YR: CPT | Mod: CPTII,,, | Performed by: INTERNAL MEDICINE

## 2023-12-08 PROCEDURE — 3078F PR MOST RECENT DIASTOLIC BLOOD PRESSURE < 80 MM HG: ICD-10-PCS | Mod: CPTII,,, | Performed by: INTERNAL MEDICINE

## 2023-12-08 PROCEDURE — 1101F PR PT FALLS ASSESS DOC 0-1 FALLS W/OUT INJ PAST YR: ICD-10-PCS | Mod: CPTII,,, | Performed by: INTERNAL MEDICINE

## 2023-12-08 PROCEDURE — 3075F SYST BP GE 130 - 139MM HG: CPT | Mod: CPTII,,, | Performed by: INTERNAL MEDICINE

## 2023-12-08 PROCEDURE — 1160F PR REVIEW ALL MEDS BY PRESCRIBER/CLIN PHARMACIST DOCUMENTED: ICD-10-PCS | Mod: CPTII,,, | Performed by: INTERNAL MEDICINE

## 2023-12-08 PROCEDURE — 3288F FALL RISK ASSESSMENT DOCD: CPT | Mod: CPTII,,, | Performed by: INTERNAL MEDICINE

## 2023-12-08 PROCEDURE — 1126F PR PAIN SEVERITY QUANTIFIED, NO PAIN PRESENT: ICD-10-PCS | Mod: CPTII,,, | Performed by: INTERNAL MEDICINE

## 2023-12-08 PROCEDURE — 3075F PR MOST RECENT SYSTOLIC BLOOD PRESS GE 130-139MM HG: ICD-10-PCS | Mod: CPTII,,, | Performed by: INTERNAL MEDICINE

## 2023-12-08 PROCEDURE — 1160F RVW MEDS BY RX/DR IN RCRD: CPT | Mod: CPTII,,, | Performed by: INTERNAL MEDICINE

## 2023-12-08 RX ORDER — ERGOCALCIFEROL 1.25 MG/1
50000 CAPSULE ORAL
Qty: 24 CAPSULE | Refills: 3 | Status: SHIPPED | OUTPATIENT
Start: 2023-12-11 | End: 2024-11-11

## 2023-12-08 NOTE — ASSESSMENT & PLAN NOTE
-labs are reviewed all essentially normal except impaired glucose for which hemoglobin A1c has been drawn today and I will follow-up on results and keep patient posted  -patient is advised on importance of watching her carbohydrate intake and saturated fat intake, making the right nutritional choices and exercising on a regular basis  -up-to-date with the screening

## 2023-12-08 NOTE — PROGRESS NOTES
Patient ID: [id number]     Chief Complaint: Medicare AWV (Wellness/)      HPI:     Riana Pastrana is a 68 y.o. female here today for a Medicare Wellness. No other complaints today.     Ms. Yoo is a 68-year-old female who is here today for her Medicare wellness visit.  Comorbidities include PAF, HTN, HLD and breast cancer status post bilateral mastectomies in April of 2022.  Followed by Oncology closely.  Overall doing well and has no acute needs or complaints.  Labs are reviewed and noted with impaired glucose regulation.    I will go ahead and get a hemoglobin A1c checked today to rule out diabetes mellitus type 2.    M CW:  12/08/2023  Bilateral mastectomies  ----------------------------  History of bilateral breast cancer      Comment:  left in 2007; bilateral in 2022  Hyperlipidemia  Hypertension  Hypothyroidism  Severe obesity (BMI 35.0-39.9) with comorbidity     Past Surgical History:   Procedure Laterality Date    BREAST BIOPSY      BREAST SURGERY      HYSTERECTOMY      TONSILLECTOMY         Review of patient's allergies indicates:   Allergen Reactions    Penicillins Other (See Comments)     UNKNOWN REACTION. ALLERGY SINCE CHILDHOOD         Outpatient Medications Marked as Taking for the 12/8/23 encounter (Office Visit) with Chely Cameron MD   Medication Sig Dispense Refill    atorvastatin (LIPITOR) 20 MG tablet TAKE 1 TABLET EVERY DAY 90 tablet 2    denosumab (PROLIA) 60 mg/mL Syrg Inject 60 mg into the skin every 6 (six) months.      furosemide (LASIX) 20 MG tablet TAKE 1 TABLET EVERY DAY AS DIRECTED (Patient taking differently: Take 20 mg by mouth once daily.) 90 tablet 3    hydroCHLOROthiazide (HYDRODIURIL) 25 MG tablet TAKE 1 TABLET EVERY DAY 90 tablet 2    letrozole (FEMARA) 2.5 mg Tab Take 1 tablet (2.5 mg total) by mouth once daily. 30 tablet 5    levothyroxine (SYNTHROID) 50 MCG tablet TAKE 1 TABLET BY MOUTH BEFORE BREAKFAST. 90 tablet 1    meloxicam (MOBIC) 7.5 MG tablet TAKE 1  TABLET EVERY DAY AS NEEDED FOR PAIN 30 tablet 10    metoprolol tartrate (LOPRESSOR) 25 MG tablet TAKE 1 TABLET TWICE DAILY 180 tablet 1    multivit-min/iron/folic/lutein (CENTRUM SILVER WOMEN ORAL) Take 1 tablet by mouth once daily.      potassium chloride (MICRO-K) 10 MEQ CpSR TAKE 1 CAPSULE ONE TIME DAILY 90 capsule 3    [DISCONTINUED] ergocalciferol (ERGOCALCIFEROL) 50,000 unit Cap Take 1 capsule (50,000 Units total) by mouth every 7 days. (Patient taking differently: Take 50,000 Units by mouth twice a week.) 12 capsule 3       Social History     Socioeconomic History    Marital status:    Tobacco Use    Smoking status: Never    Smokeless tobacco: Never   Substance and Sexual Activity    Alcohol use: Never    Drug use: Never     Social Determinants of Health     Financial Resource Strain: Low Risk  (7/27/2023)    Overall Financial Resource Strain (CARDIA)     Difficulty of Paying Living Expenses: Not hard at all   Food Insecurity: No Food Insecurity (7/27/2023)    Hunger Vital Sign     Worried About Running Out of Food in the Last Year: Never true     Ran Out of Food in the Last Year: Never true   Transportation Needs: No Transportation Needs (7/27/2023)    PRAPARE - Transportation     Lack of Transportation (Medical): No     Lack of Transportation (Non-Medical): No   Physical Activity: Sufficiently Active (7/27/2023)    Exercise Vital Sign     Days of Exercise per Week: 7 days     Minutes of Exercise per Session: 60 min   Stress: No Stress Concern Present (7/27/2023)    [employer] - Occupational Stress Questionnaire     Feeling of Stress : Not at all   Social Connections: Socially Integrated (7/27/2023)    Social Connection and Isolation Panel [NHANES]     Frequency of Communication with Friends and Family: More than three times a week     Frequency of Social Gatherings with Friends and Family: More than three times a week     Attends [employer] Services: More than 4 times per  year     Active Member of Clubs or Organizations: Yes     Attends Club or Organization Meetings: More than 4 times per year     Marital Status:    Housing Stability: Unknown (7/27/2023)    Housing Stability Vital Sign     Unable to Pay for Housing in the Last Year: No     Unstable Housing in the Last Year: No        Family History   Problem Relation Age of Onset    Ovarian cancer Sister 50        Patient Care Team:  Chely Cmaeron MD as PCP - General (Internal Medicine)  Judi Richards MD as Consulting Physician (Hematology and Oncology)     Opioid Screening: Patient medication list reviewed, patient is not taking prescription opioids. Patient is not using additional opioids than prescribed. Patient is at low risk of substance abuse based on this opioid use history.       Subjective:     ROS    A comprehensive review of systems is obtained and is essentially negative except for that stated in the HPI     Patient Reported Health Risk Assessment  What [age] 65-69  Are you male or female?: Female  During the past four weeks, how much have you been bothered by emotional problems such as feeling anxious, depressed, irritable, sad, or downhearted and blue?: Not at all  During the past five weeks, has your physical and/or emotional health limited your social activities with family, friends, neighbors, or groups?: Not at all  During the past four weeks, how much bodily pain have you generally had?: No pain  During the past four weeks, was someone available to help if you needed and wanted help?: Yes, as much as I wanted  During the past four weeks, what was the hardest physical activity you could do for at least two minutes?: Heavy  Can you get to places out of walking distance without help?  (For example, can you travel alone on buses or taxis, or drive your own car?): Yes  Can you go shopping for groceries or clothes without someone's help?: Yes  Can you prepare your own meals?: Yes  Can you do  your own housework without help?: Yes  Because of any health problems, do you need the help of another person with your personal care needs such as eating, bathing, dressing, or getting around the house?: No  Can you handle your own money without help?: Yes  During the past four weeks, how would you rate your health in general?: Excellent  How have things been going for you during the past four weeks?: Very well  Are you having difficulties driving your car?: No  Do you always fasten your seat belt when you are in a car?: Yes, usually  How often in the past four weeks have you been bothered by falling or dizzy when standing up?: Never  How often in the past four weeks have you been bothered by sexual problems?: Never  How often in the past four weeks have you been bothered by trouble eating well?: Never  How often in the past four weeks have you been bothered by teeth or denture problems?: Never  How often in the past four weeks have you been bothered with problems using the telephone?: Never  How often in the past four weeks have you been bothered by tiredness or fatigue?: Never  Have you fallen two or more times in the past year?: No  Are you afraid of falling?: No  Are you a smoker?: No  During the past four weeks, how many drinks of wine, beer, or other alcoholic beverages did you have?: No alcohol at all  Do you exercise for about 20 minutes three or more days a week?: Yes, most of the time  Have you been given any information to help you with hazards in your house that might hurt you?: Yes  Have you been given any information to help you with keeping track of your medications?: Yes  How often do you have trouble taking medicines the way you've been told to take them?: I always take them as prescribed  How confident are you that you can control and manage most of your health problems?: Very confident  What is your race? (Check all that apply.):     Objective:     /72 (BP Location: Right arm,  "Patient Position: Sitting, BP Method: Large (Manual))   Pulse 68   Temp 97.7 °F (36.5 °C) (Temporal)   Ht 5' 4" (1.626 m)   Wt 98.9 kg (218 lb)   SpO2 96%   BMI 37.42 kg/m²     Physical Exam  Constitutional:       Appearance: Normal appearance.      Comments: Bilateral mastectomy   HENT:      Head: Normocephalic and atraumatic.      Nose: Nose normal.      Mouth/Throat:      Mouth: Mucous membranes are moist.      Pharynx: Oropharynx is clear.   Eyes:      Extraocular Movements: Extraocular movements intact.      Pupils: Pupils are equal, round, and reactive to light.   Cardiovascular:      Rate and Rhythm: Normal rate and regular rhythm.      Pulses: Normal pulses.   Pulmonary:      Effort: Pulmonary effort is normal.      Breath sounds: Normal breath sounds.   Abdominal:      General: Bowel sounds are normal.      Palpations: Abdomen is soft.   Musculoskeletal:         General: Normal range of motion.      Cervical back: Normal range of motion and neck supple.   Skin:     General: Skin is warm.   Neurological:      General: No focal deficit present.      Mental Status: She is alert and oriented to person, place, and time. Mental status is at baseline.   Psychiatric:         Mood and Affect: Mood normal.                No data to display                  12/8/2023     9:00 AM 10/17/2023     9:30 AM 7/27/2023     9:00 AM 6/28/2023    10:00 AM 4/17/2023     9:00 AM 3/27/2023     1:00 PM 3/27/2023    10:30 AM   Fall Risk Assessment - Outpatient   Mobility Status Ambulatory Ambulatory Ambulatory Ambulatory Ambulatory Ambulatory Ambulatory   Number of falls 0 0 0 0 0 0 0   Identified as fall risk False True False False False False False           Depression Screening  Over the past two weeks, has the patient felt down, depressed, or hopeless?: No  Over the past two weeks, has the patient felt little interest or pleasure in doing things?: No  Functional Ability/Safety Screening  Was the patient's timed Up & Go test " unsteady or longer than 30 seconds?: No  Does the patient need help with phone, transportation, shopping, preparing meals, housework, laundry, meds, or managing money?: No  Does the patient's home have rugs in the hallway, lack grab bars in the bathroom, lack handrails on the stairs or have poor lighting?: No  Have you noticed any hearing difficulties?: No  Cognitive Function (Assessed through direct observation with due consideration of information obtained by way of patient reports and/or concerns raised by family, friends, caretakers, or others)    Does the patient repeat questions/statements in the same day?: No  Does the patient have trouble remembering the date, year, and time?: No  Does the patient have difficulty managing finances?: No  Does the patient have a decreased sense of direction?: No      Assessment:     Problem List Items Addressed This Visit          Cardiac/Vascular    A-fib (Chronic)     -currently rate and rhythm controlled, on metoprolol, continue         Hypertension (Chronic)     Well controlled.   Continue Metoprolol 25 mg p.o. daily and hydrochlorothiazide 25 mg p.o. daily  Low Sodium Diet (DASH Diet - Less than 2 grams of sodium per day).  Monitor blood pressure daily and log. Report consistent numbers greater than 140/90.  Maintain healthy weight with goal BMI <30. Exercise 30 minutes per day, 5 days per week.              Hyperlipidemia (Chronic)     -labs are reviewed and essentially normal   -on statin, continue         Intermittent claudication     -stable for now            Endocrine    Hypothyroidism (Chronic)     -well controlled continue current dose of levothyroxine 50 mcg p.o. daily         Severe obesity (BMI 35.0-39.9) with comorbidity (Chronic)     Body mass index is 37.42 kg/m².  Goal BMI <30.  Exercise 5 times a week for 30 minutes per day.  Avoid soda, simple sugars, excessive rice, potatoes or bread. Limit fast foods and fried foods.  Choose complex carbs in moderation  (example: green vegetables, beans, oatmeal). Eat plenty of fresh fruits and vegetables with lean meats daily.  Do not skip meals. Eat a balanced portion size.  Avoid fad diets. Consider permanent healthy life style changes.           Osteoporosis (Chronic)     -on treatment with Prolia every 6 months, doing well   -weight-bearing exercises emphasized   -calcium plus vitamin-D on a regular basis         Impaired glucose regulation - Primary     -patient advised on the importance of avoiding excessive carbohydrates and sugary food intake and having some form of routine aerobic exercise on a regular basis.          Relevant Orders    Hemoglobin A1C       Other    Medicare annual wellness visit, subsequent     -labs are reviewed all essentially normal except impaired glucose for which hemoglobin A1c has been drawn today and I will follow-up on results and keep patient posted  -patient is advised on importance of watching her carbohydrate intake and saturated fat intake, making the right nutritional choices and exercising on a regular basis  -up-to-date with the screening           Other Visit Diagnoses       Vitamin D insufficiency        Relevant Medications    ergocalciferol (ERGOCALCIFEROL) 50,000 unit Cap (Start on 12/11/2023)    Secondary hyperparathyroidism        Relevant Medications    ergocalciferol (ERGOCALCIFEROL) 50,000 unit Cap (Start on 12/11/2023)            Plan:     Medicare Annual Wellness and Personalized Prevention Plan:   Fall Risk + Home Safety + Hearing Impairment + Depression Screen + Cognitive Impairment Screen + Health Risk Assessment all reviewed.       Health Maintenance Topics with due status: Not Due       Topic Last Completion Date    Colorectal Cancer Screening 11/16/2018    Hemoglobin A1c (Diabetic Prevention Screening) 11/09/2021    DEXA Scan 09/21/2022    Lipid Panel 12/04/2023      The patient's Health Maintenance was reviewed and the following appears to be due at this time:   Health  Maintenance Due   Topic Date Due    Hepatitis C Screening  Never done    TETANUS VACCINE  Never done    Shingles Vaccine (1 of 2) Never done         Advance Care Planning   Deffered           Medication List with Changes/Refills   Current Medications    ATORVASTATIN (LIPITOR) 20 MG TABLET    TAKE 1 TABLET EVERY DAY       Start Date: 7/6/2023  End Date: --    DENOSUMAB (PROLIA) 60 MG/ML SYRG    Inject 60 mg into the skin every 6 (six) months.       Start Date: --        End Date: --    FUROSEMIDE (LASIX) 20 MG TABLET    TAKE 1 TABLET EVERY DAY AS DIRECTED       Start Date: 7/18/2023 End Date: --    HYDROCHLOROTHIAZIDE (HYDRODIURIL) 25 MG TABLET    TAKE 1 TABLET EVERY DAY       Start Date: 2/27/2023 End Date: --    LETROZOLE (FEMARA) 2.5 MG TAB    Take 1 tablet (2.5 mg total) by mouth once daily.       Start Date: 7/13/2023 End Date: 1/9/2024    LEVOTHYROXINE (SYNTHROID) 50 MCG TABLET    TAKE 1 TABLET BY MOUTH BEFORE BREAKFAST.       Start Date: 7/20/2023 End Date: --    MELOXICAM (MOBIC) 7.5 MG TABLET    TAKE 1 TABLET EVERY DAY AS NEEDED FOR PAIN       Start Date: 11/20/2023End Date: --    METOPROLOL TARTRATE (LOPRESSOR) 25 MG TABLET    TAKE 1 TABLET TWICE DAILY       Start Date: 5/10/2023 End Date: --    MULTIVIT-MIN/IRON/FOLIC/LUTEIN (CENTRUM SILVER WOMEN ORAL)    Take 1 tablet by mouth once daily.       Start Date: --        End Date: --    POTASSIUM CHLORIDE (MICRO-K) 10 MEQ CPSR    TAKE 1 CAPSULE ONE TIME DAILY       Start Date: 8/30/2023 End Date: --   Changed and/or Refilled Medications    Modified Medication Previous Medication    ERGOCALCIFEROL (ERGOCALCIFEROL) 50,000 UNIT CAP ergocalciferol (ERGOCALCIFEROL) 50,000 unit Cap       Take 1 capsule (50,000 Units total) by mouth twice a week.    Take 1 capsule (50,000 Units total) by mouth every 7 days.       Start Date: 12/11/2023End Date: 11/11/2024    Start Date: 10/31/2023End Date: 12/8/2023        Follow up in about 4 months (around 4/8/2024) for BP Check.  In addition to their scheduled follow up, the patient has also been instructed to follow up on as needed basis.

## 2023-12-08 NOTE — ASSESSMENT & PLAN NOTE

## 2023-12-08 NOTE — ASSESSMENT & PLAN NOTE
-patient advised on the importance of avoiding excessive carbohydrates and sugary food intake and having some form of routine aerobic exercise on a regular basis.

## 2023-12-08 NOTE — ASSESSMENT & PLAN NOTE
Well controlled.   Continue Metoprolol 25 mg p.o. daily and hydrochlorothiazide 25 mg p.o. daily  Low Sodium Diet (DASH Diet - Less than 2 grams of sodium per day).  Monitor blood pressure daily and log. Report consistent numbers greater than 140/90.  Maintain healthy weight with goal BMI <30. Exercise 30 minutes per day, 5 days per week.

## 2023-12-08 NOTE — ASSESSMENT & PLAN NOTE
-on treatment with Prolia every 6 months, doing well   -weight-bearing exercises emphasized   -calcium plus vitamin-D on a regular basis

## 2023-12-13 DIAGNOSIS — I10 PRIMARY HYPERTENSION: ICD-10-CM

## 2023-12-13 RX ORDER — HYDROCHLOROTHIAZIDE 25 MG/1
TABLET ORAL
Qty: 90 TABLET | Refills: 3 | Status: SHIPPED | OUTPATIENT
Start: 2023-12-13

## 2023-12-13 RX ORDER — METOPROLOL TARTRATE 25 MG/1
TABLET, FILM COATED ORAL
Qty: 180 TABLET | Refills: 3 | Status: SHIPPED | OUTPATIENT
Start: 2023-12-13

## 2023-12-21 ENCOUNTER — LAB VISIT (OUTPATIENT)
Dept: LAB | Facility: HOSPITAL | Age: 68
End: 2023-12-21
Attending: INTERNAL MEDICINE
Payer: MEDICARE

## 2023-12-21 DIAGNOSIS — Z51.81 THERAPEUTIC DRUG MONITORING: ICD-10-CM

## 2023-12-21 DIAGNOSIS — D05.12 DUCTAL CARCINOMA IN SITU (DCIS) OF LEFT BREAST: ICD-10-CM

## 2023-12-21 DIAGNOSIS — Z79.811 USE OF LETROZOLE (FEMARA): ICD-10-CM

## 2023-12-21 DIAGNOSIS — Z90.13 HISTORY OF BILATERAL MASTECTOMY: ICD-10-CM

## 2023-12-21 DIAGNOSIS — C50.211 MALIGNANT NEOPLASM OF UPPER-INNER QUADRANT OF RIGHT BREAST IN FEMALE, ESTROGEN RECEPTOR POSITIVE: ICD-10-CM

## 2023-12-21 DIAGNOSIS — Z17.0 MALIGNANT NEOPLASM OF UPPER-INNER QUADRANT OF RIGHT BREAST IN FEMALE, ESTROGEN RECEPTOR POSITIVE: ICD-10-CM

## 2023-12-21 LAB
ALBUMIN SERPL-MCNC: 4.2 G/DL (ref 3.4–4.8)
ALBUMIN/GLOB SERPL: 1.2 RATIO (ref 1.1–2)
ALP SERPL-CCNC: 108 UNIT/L (ref 40–150)
ALT SERPL-CCNC: 33 UNIT/L (ref 0–55)
AST SERPL-CCNC: 34 UNIT/L (ref 5–34)
BASOPHILS # BLD AUTO: 0.05 X10(3)/MCL
BASOPHILS NFR BLD AUTO: 0.7 %
BILIRUB SERPL-MCNC: 0.9 MG/DL
BUN SERPL-MCNC: 11.6 MG/DL (ref 9.8–20.1)
CALCIUM SERPL-MCNC: 9.9 MG/DL (ref 8.4–10.2)
CHLORIDE SERPL-SCNC: 100 MMOL/L (ref 98–107)
CO2 SERPL-SCNC: 34 MMOL/L (ref 23–31)
CREAT SERPL-MCNC: 0.85 MG/DL (ref 0.55–1.02)
EOSINOPHIL # BLD AUTO: 0.09 X10(3)/MCL (ref 0–0.9)
EOSINOPHIL NFR BLD AUTO: 1.3 %
ERYTHROCYTE [DISTWIDTH] IN BLOOD BY AUTOMATED COUNT: 12.7 % (ref 11.5–17)
GFR SERPLBLD CREATININE-BSD FMLA CKD-EPI: >60 MLS/MIN/1.73/M2
GLOBULIN SER-MCNC: 3.4 GM/DL (ref 2.4–3.5)
GLUCOSE SERPL-MCNC: 127 MG/DL (ref 82–115)
HCT VFR BLD AUTO: 41.4 % (ref 37–47)
HGB BLD-MCNC: 13.7 G/DL (ref 12–16)
IMM GRANULOCYTES # BLD AUTO: 0.01 X10(3)/MCL (ref 0–0.04)
IMM GRANULOCYTES NFR BLD AUTO: 0.1 %
LYMPHOCYTES # BLD AUTO: 2.05 X10(3)/MCL (ref 0.6–4.6)
LYMPHOCYTES NFR BLD AUTO: 30.4 %
MCH RBC QN AUTO: 30.9 PG (ref 27–31)
MCHC RBC AUTO-ENTMCNC: 33.1 G/DL (ref 33–36)
MCV RBC AUTO: 93.2 FL (ref 80–94)
MONOCYTES # BLD AUTO: 0.61 X10(3)/MCL (ref 0.1–1.3)
MONOCYTES NFR BLD AUTO: 9 %
NEUTROPHILS # BLD AUTO: 3.94 X10(3)/MCL (ref 2.1–9.2)
NEUTROPHILS NFR BLD AUTO: 58.5 %
PLATELET # BLD AUTO: 221 X10(3)/MCL (ref 130–400)
PMV BLD AUTO: 10.3 FL (ref 7.4–10.4)
POTASSIUM SERPL-SCNC: 4 MMOL/L (ref 3.5–5.1)
PROT SERPL-MCNC: 7.6 GM/DL (ref 5.8–7.6)
RBC # BLD AUTO: 4.44 X10(6)/MCL (ref 4.2–5.4)
SODIUM SERPL-SCNC: 142 MMOL/L (ref 136–145)
WBC # SPEC AUTO: 6.75 X10(3)/MCL (ref 4.5–11.5)

## 2023-12-21 PROCEDURE — 85025 COMPLETE CBC W/AUTO DIFF WBC: CPT

## 2023-12-21 PROCEDURE — 36415 COLL VENOUS BLD VENIPUNCTURE: CPT

## 2023-12-21 PROCEDURE — 86300 IMMUNOASSAY TUMOR CA 15-3: CPT

## 2023-12-21 PROCEDURE — 80053 COMPREHEN METABOLIC PANEL: CPT

## 2023-12-26 LAB — CANCER AG27-29 SERPL-ACNC: 20.5 U/ML

## 2023-12-28 ENCOUNTER — OFFICE VISIT (OUTPATIENT)
Dept: HEMATOLOGY/ONCOLOGY | Facility: CLINIC | Age: 68
End: 2023-12-28
Payer: MEDICARE

## 2023-12-28 VITALS
HEART RATE: 65 BPM | HEIGHT: 64 IN | DIASTOLIC BLOOD PRESSURE: 68 MMHG | OXYGEN SATURATION: 97 % | BODY MASS INDEX: 37.49 KG/M2 | SYSTOLIC BLOOD PRESSURE: 132 MMHG | WEIGHT: 219.63 LBS | TEMPERATURE: 98 F | RESPIRATION RATE: 18 BRPM

## 2023-12-28 DIAGNOSIS — Z17.0 MALIGNANT NEOPLASM OF UPPER-INNER QUADRANT OF RIGHT BREAST IN FEMALE, ESTROGEN RECEPTOR POSITIVE: Primary | ICD-10-CM

## 2023-12-28 DIAGNOSIS — C50.211 MALIGNANT NEOPLASM OF UPPER-INNER QUADRANT OF RIGHT BREAST IN FEMALE, ESTROGEN RECEPTOR POSITIVE: Primary | ICD-10-CM

## 2023-12-28 DIAGNOSIS — Z51.81 THERAPEUTIC DRUG MONITORING: ICD-10-CM

## 2023-12-28 DIAGNOSIS — Z79.811 USE OF LETROZOLE (FEMARA): ICD-10-CM

## 2023-12-28 PROCEDURE — 1160F PR REVIEW ALL MEDS BY PRESCRIBER/CLIN PHARMACIST DOCUMENTED: ICD-10-PCS | Mod: CPTII,S$GLB,, | Performed by: NURSE PRACTITIONER

## 2023-12-28 PROCEDURE — 99214 OFFICE O/P EST MOD 30 MIN: CPT | Mod: S$GLB,,, | Performed by: NURSE PRACTITIONER

## 2023-12-28 PROCEDURE — 99214 PR OFFICE/OUTPT VISIT, EST, LEVL IV, 30-39 MIN: ICD-10-PCS | Mod: S$GLB,,, | Performed by: NURSE PRACTITIONER

## 2023-12-28 PROCEDURE — 3288F FALL RISK ASSESSMENT DOCD: CPT | Mod: CPTII,S$GLB,, | Performed by: NURSE PRACTITIONER

## 2023-12-28 PROCEDURE — 3075F SYST BP GE 130 - 139MM HG: CPT | Mod: CPTII,S$GLB,, | Performed by: NURSE PRACTITIONER

## 2023-12-28 PROCEDURE — 1126F AMNT PAIN NOTED NONE PRSNT: CPT | Mod: CPTII,S$GLB,, | Performed by: NURSE PRACTITIONER

## 2023-12-28 PROCEDURE — 1126F PR PAIN SEVERITY QUANTIFIED, NO PAIN PRESENT: ICD-10-PCS | Mod: CPTII,S$GLB,, | Performed by: NURSE PRACTITIONER

## 2023-12-28 PROCEDURE — 3008F PR BODY MASS INDEX (BMI) DOCUMENTED: ICD-10-PCS | Mod: CPTII,S$GLB,, | Performed by: NURSE PRACTITIONER

## 2023-12-28 PROCEDURE — 3075F PR MOST RECENT SYSTOLIC BLOOD PRESS GE 130-139MM HG: ICD-10-PCS | Mod: CPTII,S$GLB,, | Performed by: NURSE PRACTITIONER

## 2023-12-28 PROCEDURE — 1101F PT FALLS ASSESS-DOCD LE1/YR: CPT | Mod: CPTII,S$GLB,, | Performed by: NURSE PRACTITIONER

## 2023-12-28 PROCEDURE — 3044F PR MOST RECENT HEMOGLOBIN A1C LEVEL <7.0%: ICD-10-PCS | Mod: CPTII,S$GLB,, | Performed by: NURSE PRACTITIONER

## 2023-12-28 PROCEDURE — 99999 PR PBB SHADOW E&M-EST. PATIENT-LVL IV: CPT | Mod: PBBFAC,,, | Performed by: NURSE PRACTITIONER

## 2023-12-28 PROCEDURE — 3008F BODY MASS INDEX DOCD: CPT | Mod: CPTII,S$GLB,, | Performed by: NURSE PRACTITIONER

## 2023-12-28 PROCEDURE — 3288F PR FALLS RISK ASSESSMENT DOCUMENTED: ICD-10-PCS | Mod: CPTII,S$GLB,, | Performed by: NURSE PRACTITIONER

## 2023-12-28 PROCEDURE — 3078F DIAST BP <80 MM HG: CPT | Mod: CPTII,S$GLB,, | Performed by: NURSE PRACTITIONER

## 2023-12-28 PROCEDURE — 1160F RVW MEDS BY RX/DR IN RCRD: CPT | Mod: CPTII,S$GLB,, | Performed by: NURSE PRACTITIONER

## 2023-12-28 PROCEDURE — 1159F PR MEDICATION LIST DOCUMENTED IN MEDICAL RECORD: ICD-10-PCS | Mod: CPTII,S$GLB,, | Performed by: NURSE PRACTITIONER

## 2023-12-28 PROCEDURE — 1101F PR PT FALLS ASSESS DOC 0-1 FALLS W/OUT INJ PAST YR: ICD-10-PCS | Mod: CPTII,S$GLB,, | Performed by: NURSE PRACTITIONER

## 2023-12-28 PROCEDURE — 3078F PR MOST RECENT DIASTOLIC BLOOD PRESSURE < 80 MM HG: ICD-10-PCS | Mod: CPTII,S$GLB,, | Performed by: NURSE PRACTITIONER

## 2023-12-28 PROCEDURE — 3044F HG A1C LEVEL LT 7.0%: CPT | Mod: CPTII,S$GLB,, | Performed by: NURSE PRACTITIONER

## 2023-12-28 PROCEDURE — 1159F MED LIST DOCD IN RCRD: CPT | Mod: CPTII,S$GLB,, | Performed by: NURSE PRACTITIONER

## 2023-12-28 PROCEDURE — 99999 PR PBB SHADOW E&M-EST. PATIENT-LVL IV: ICD-10-PCS | Mod: PBBFAC,,, | Performed by: NURSE PRACTITIONER

## 2023-12-28 RX ORDER — LETROZOLE 2.5 MG/1
2.5 TABLET, FILM COATED ORAL DAILY
Qty: 30 TABLET | Refills: 5 | Status: SHIPPED | OUTPATIENT
Start: 2023-12-28 | End: 2024-01-30 | Stop reason: SDUPTHER

## 2023-12-28 NOTE — PROGRESS NOTES
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 2022 BL SC MG at [facility]-which revealed on R MG lower inner quadrant anterior depth, there are two focal asymmetries. On L MG central region posterior depth there are indeterminate calcifications. At 12 o'clock left breast middle depth, there is a focal asymmetry with calcifications. There are stable post-therapy changes of the left breast. No detrimental change at the lumpectomy bed. There are stable post-core needle biopsy changes and a jesús shaped clip in the right breast. No detrimental change at this biopsy site. No other significant masses, calcifications, or other findings are seen in either breast. BIRADS-0 ; additional imaging needed.    2. 2022 BL DG MG/ BL US BREAST LIMITED at [facility]- which revealed on R MG at 4 o'clock subareolar anterior there is a 0.7 cm oval, circumscribed mass. On L MG 12 o'clock, anterior depth, there is a 1.2 cm focal asymmetry with associated heterogeneous calcifications. These findings correspond to the areas recalled on the screening examination dated 2022. No other suspicious masses, calcifications, or other signs of malignancy are  identified. On R US, at 3 o'clock subareolar there is a 0.6 x 0.4 x 0.4 cm oval, hypoechoic mass with indistinct margins. At 4 o'clock subareolar right breast, there is a 0.7 x 0.3 x 0.6 cm benign, simple cyst. These correspond to the mammographic findings in these regions. No other suspicious sonographic abnormality is seen. Specifically, no suspicious sonographic correlate is seen for the focal asymmetry in the 12 o'clock left breast. Benign-appearing lymph nodes are noted in the bilateral axillae. BIRADS-4 suspicious; biopsy recommended.    Bone density 9/21/22:   LUMBAR SPINE The L1 to L3 vertebral bone mineral density is equal to 1.121 g/cm squared with a T score of -0.5.  LEFT HIP The left femoral neck bone mineral density is equal to 0.744 g/cm squared with a T score of -2.1.   The left total hip bone mineral density is equal to 0.816 g/cm squared with a T score of -1.5.   RIGHT HIP The right femoral neck bone mineral density is equal to 0.694 g/cm squared with a T score of -2.5.  The right total hip bone mineral density is equal to 0.824 g/cm squared with a T score of -1.5.  FRAX 10-YEAR PROBABILITY OF FRACTURE: 21.4% risk of a major osteoporotic fracture and 2.9% risk of hip fracture in the next 10 years    Impression: Osteoporosis      Pathology:  3/16/2022:   [1] LEFT BREAST POSTERIOR DEPTH AMORPHOUS GROUPED CALCIFICATIONS CENTRAL REGION: DUCTAL CARCINOMA IN SITU, LOW GRADE CRIBRIFORM/MICROPAPILLARY TYPE, WITH MICROCALCIFICATIONS. DCIS is present on two core biopsies, the largest focus measuring less than 3 mm.   [2] LEFT BREAST 12 O' CLOCK ANTERIOR DEPTH FOCAL ASYMMETRY WITH HETEROGENOUS CALCIFICATIONS:SCLEROTIC FIBROADENOMATOID NODULE WITH DYSTROPIC CALCIFICATION.   [3] RIGHT BREAST 3 O' CLOCK SUBAREOLAR MASS: INVASIVE DUCTAL CARCINOMA, SOTO-ROMERO GRADE 1.  Carcinoma is present on two core biopsies and measures 5 mm.    %, %, Her2 neg, Ki67 58%.    4/25/2022:  [1]  BREAST, LEFT, SIMPLE  MASTECTOMY: DIMINUTIVE FOCUS OF RESIDUAL LOW GRADE DUCTAL CARCINOMA IN SITU.  [2]  BREAST, RIGHT, SIMPLE MASTECTOMY: INVASIVE DUCTAL CARCINOMA, LOW GRADE, GRADE 1(OF 3).  -Tumor size:  4.0 mm. pT Category:  pT1a  pN0  BREAST, RIGHT, AXILLARY SENTINEL NODE #4/4: NEGATIVE FOR METASTATIC CARCINOMA.  The patient's previous history of low grade invasive ductal carcinoma is noted from surgical pathology report [id number] ER (100%), ME (100%), HER2 negative, Ki-67 high (58%).         CLINICAL HISTORY:       Patient: Riana Pastrana is a 68 y.o. female kindly referred for history of breast cancer.  I do not have record of her diagnosis and treatment of her original breast cancer.    Patient states that she was diagnosed with breast cancer in 2005.  She had Left lumpectomy done, with no chemotherapy or radiation. She took tamoxifen for 5 years. She reports that she was treated by Dr. Robbin Kenny. She says that she was seen at [facility], but since her insurance has changed she would like to establish care here.     Patient reports menarche at 13. She had 3 pregnancies, 2 live children, 1 miscarriage. Her first child was at age 20.She had a partial hysterectomy at age 29. She admits to oral contraceptives for about 3 years. No hormone replacement therapy.     Her sister had ovarian cancer diagnosed in her 50's, currently still alive at age 80.    She reports that she had a mass in her stomach and it was removed in 2017 along with her ovaries at [facility].  Again I do not have the records, but imaging studies none here include CT scan of the abdomen and pelvis done on 5/21/2017 for an abdominal distention showed a 22 x 21 x 16 cm complex septated peripherally enhancing mass which appears to arise from the right adnexa, presumably ovarian in origin.  Uterus not identified.  Cystic mass in the abdomen and pelvis displaces the urinary bladder inferiorly.  Liver is is heterogeneous in attenuation and contains 2  low-attenuation masses in the dome, too small to accurately characterize by CT.  Spleen normal in size. CT of the chest showed cardiomegaly with moderate pericardial effusion.  Bilateral lower lobe consolidation with atelectasis and bilateral pleural effusions, left greater than right. Large abdominal cystic mass of unknown etiology. Further assessment with dedicated imaging of the abdomen and pelvis recommended. US pelvis 5/22/2021:  A large, multilocular mass consistent with the CT findings is identified measuring 27.5 x 20.1 x 19.6 cm. There are multiple septations or separate fluid loculations in the large cystic mass. Low level internal echogenicity is also visible in the cyst fluid with dependent debris also identified. No free pelvic fluid is visible. This lesion is suspicious for neoplasm of pelvic and likely ovarian origin. The uterus is not identified and surgically absent by patient history. 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  Ascites  suggestive of peritoneal implants,  the ascites is new from the previous study. No Path available    On 2/7/2022 screening mammogram: INCOMPLETE: NEEDS ADDITIONAL IMAGING EVALUATION  Right breast focal asymmetries, left breast calcifications, and left breast focal asymmetry with calcifications need further evaluation. BI-RADS 0: Incomplete. Need additional imaging evaluation.     On 2/24/2022 Diagnostic right mammogram and Breast US: SUSPICIOUS OF MALIGNANCY  1. Oval, hypoechoic mass with indistinct margins in the 3:00 subareolar right breast is suspicious. Right breast ultrasound-guided biopsy is recommended.  2. Amorphous, grouped calcifications in the central left breast, posterior depth, are suspicious. Left breast stereotactic guided biopsy is recommended.  3. Focal asymmetry with associated heterogeneous calcifications in the 12:00 left breast, anterior depth, is suspicious. Left breast stereotactic guided  biopsy is recommended.     On 3/16/2022 she is schedule for breast bx. otherwise she is doing well and voices no concerns.  No fever, chills, sweats.  No chest pain or shortness of breath.  No changes in bowel habits.  No abdominal or pelvic pain.  No neurological symptoms.    [1] LEFT BREAST POSTERIOR DEPTH AMORPHOUS GROUPED CALCIFICATIONS CENTRAL REGION:  DUCTAL CARCINOMA IN SITU, LOW GRADE CRIBRIFORM/MICROPAPILLARY TYPE, WITH MICROCALCIFICATIONS.  COMMENT: DCIS is present on two core biopsies, the largest focus measuring less than 3 mm. The results of ER/MI analysis will be the subject of an addendum report.  [2] LEFT BREAST 12 O' CLOCK ANTERIOR DEPTH FOCAL ASYMMETRY WITH HETEROGENOUS CALCIFICATIONS:  SCLEROTIC FIBROADENOMATOID NODULE WITH DYSTROPIC CALCIFICATION.  [3] RIGHT BREAST 3 O' CLOCK SUBAREOLAR MASS:  INVASIVE DUCTAL CARCINOMA, SOTO-ROMERO GRADE 1.  COMMENT: Carcinoma is present on two core biopsies and measures 5 mm.    %, %, Her2 neg, Ki67 58%.      Oncotype showed a recurrence score of 18 with a distant recurrent risk at 9 years of 5% with AI or tamoxifen alone and <1% absolute chemotherapy benefit.     Chief Complaint: Follow-up (No concerns today.)      Interval History:  Patient presents today for follow up in surveillance of her breast cancer. She is taking Letrozole and tolerating well (started March 2022). She is also taking Prolia, next due on 4/16/24. Overall, she is doing well. She denies any fever, chills, sweats. No chest pain or shortness of breath.  No changes in bowel habits.  Takes calcium + vitamin D.      Past Medical History:   Diagnosis Date    History of bilateral breast cancer     left in 2007; bilateral in 2022    Hyperlipidemia 5/13/2022    Hypertension     Hypothyroidism 5/13/2022    Severe obesity (BMI 35.0-39.9) with comorbidity 5/13/2022      Past Surgical History:   Procedure Laterality Date    BREAST BIOPSY      BREAST SURGERY      HYSTERECTOMY       TONSILLECTOMY       Family History   Problem Relation Age of Onset    Ovarian cancer Sister 50     Social Connections: Socially Integrated (7/27/2023)    Social Connection and Isolation Panel [NHANES]     Frequency of Communication with Friends and Family: More than three times a week     Frequency of Social Gatherings with Friends and Family: More than three times a week     Attends Taoism Services: More than 4 times per year     Active Member of Clubs or Organizations: Yes     Attends Club or Organization Meetings: More than 4 times per year     Marital Status:        Review of patient's allergies indicates:   Allergen Reactions    Penicillins Other (See Comments)     UNKNOWN REACTION. ALLERGY SINCE CHILDHOOD        Current Outpatient Medications on File Prior to Visit   Medication Sig Dispense Refill    atorvastatin (LIPITOR) 20 MG tablet TAKE 1 TABLET EVERY DAY 90 tablet 2    denosumab (PROLIA) 60 mg/mL Syrg Inject 60 mg into the skin every 6 (six) months.      ergocalciferol (ERGOCALCIFEROL) 50,000 unit Cap Take 1 capsule (50,000 Units total) by mouth twice a week. 24 capsule 3    furosemide (LASIX) 20 MG tablet TAKE 1 TABLET EVERY DAY AS DIRECTED (Patient taking differently: Take 20 mg by mouth once daily.) 90 tablet 3    hydroCHLOROthiazide (HYDRODIURIL) 25 MG tablet TAKE 1 TABLET EVERY DAY 90 tablet 3    letrozole (FEMARA) 2.5 mg Tab Take 1 tablet (2.5 mg total) by mouth once daily. 30 tablet 5    levothyroxine (SYNTHROID) 50 MCG tablet TAKE 1 TABLET BY MOUTH BEFORE BREAKFAST. 90 tablet 1    meloxicam (MOBIC) 7.5 MG tablet TAKE 1 TABLET EVERY DAY AS NEEDED FOR PAIN 30 tablet 10    metoprolol tartrate (LOPRESSOR) 25 MG tablet TAKE 1 TABLET TWICE DAILY 180 tablet 3    multivit-min/iron/folic/lutein (CENTRUM SILVER WOMEN ORAL) Take 1 tablet by mouth once daily.      potassium chloride (MICRO-K) 10 MEQ CpSR TAKE 1 CAPSULE ONE TIME DAILY 90 capsule 3     No current facility-administered medications on  "file prior to visit.      Review of Systems   Constitutional:  Negative for activity change, appetite change, chills, fatigue, fever and unexpected weight change.   HENT:  Negative for mouth dryness, mouth sores, nosebleeds, sore throat and trouble swallowing.    Eyes:  Negative for visual disturbance.   Respiratory:  Negative for cough, chest tightness and shortness of breath.    Cardiovascular:  Negative for chest pain, palpitations and leg swelling.   Gastrointestinal:  Negative for abdominal distention, abdominal pain, blood in stool, change in bowel habit, constipation, diarrhea, nausea and vomiting.   Endocrine: Negative.    Genitourinary:  Negative for dysuria, frequency, hematuria and urgency.   Musculoskeletal:  Negative for arthralgias, back pain, myalgias and neck pain.   Integumentary:  Negative for rash.   Neurological:  Negative for dizziness, tremors, syncope, speech difficulty, weakness, light-headedness, numbness, headaches and memory loss.   Hematological:  Does not bruise/bleed easily.   Psychiatric/Behavioral:  Negative for confusion and suicidal ideas.               Vitals:    12/28/23 0926   BP: 132/68   BP Location: Right arm   Patient Position: Sitting   Pulse: 65   Resp: 18   Temp: 98 °F (36.7 °C)   TempSrc: Oral   SpO2: 97%   Weight: 99.6 kg (219 lb 9.6 oz)   Height: 5' 4" (1.626 m)            Physical Exam  Vitals and nursing note reviewed.   Constitutional:       General: She is not in acute distress.     Appearance: Normal appearance. She is well-developed. She is obese.   HENT:      Head: Normocephalic and atraumatic.      Mouth/Throat:      Mouth: Mucous membranes are moist.   Eyes:      General: No scleral icterus.     Extraocular Movements: Extraocular movements intact.      Conjunctiva/sclera: Conjunctivae normal.      Pupils: Pupils are equal, round, and reactive to light.   Neck:      Vascular: No JVD.   Cardiovascular:      Rate and Rhythm: Normal rate. Rhythm irregularly " irregular.      Heart sounds: No murmur heard.  Pulmonary:      Effort: Pulmonary effort is normal.      Breath sounds: Normal breath sounds. No wheezing or rhonchi.   Chest:      Chest wall: No deformity or tenderness.   Breasts:     Right: Absent. No swelling, mass, nipple discharge, skin change or tenderness.      Left: Absent. No swelling, mass, nipple discharge, skin change or tenderness.   Abdominal:      General: Bowel sounds are normal. There is no distension.      Palpations: Abdomen is soft. There is no mass.      Tenderness: There is no abdominal tenderness.   Musculoskeletal:         General: No swelling or deformity.      Cervical back: Neck supple.   Lymphadenopathy:      Head:      Right side of head: No submandibular adenopathy.      Left side of head: No submandibular adenopathy.      Cervical: No cervical adenopathy.      Upper Body:      Right upper body: No supraclavicular or axillary adenopathy.      Left upper body: No supraclavicular or axillary adenopathy.      Lower Body: No right inguinal adenopathy. No left inguinal adenopathy.   Skin:     General: Skin is warm.      Coloration: Skin is not jaundiced.      Findings: No lesion or rash.      Nails: There is no clubbing.   Neurological:      General: No focal deficit present.      Mental Status: She is alert and oriented to person, place, and time.      Cranial Nerves: Cranial nerves 2-12 are intact.      Sensory: Sensation is intact.      Motor: Motor function is intact.      Gait: Gait is intact.   Psychiatric:         Attention and Perception: Attention normal.         Mood and Affect: Mood and affect normal.         Speech: Speech normal.         Behavior: Behavior is cooperative.         Thought Content: Thought content normal.         Cognition and Memory: Cognition normal.         Judgment: Judgment normal.       ECOG SCORE             Laboratory:   Latest Reference Range & Units 05/10/22 10:54   WBC 4.5 - 11.5 x10(3)/mcL 6.0   RBC  4.20 - 5.40 x10(6)/mcL 4.08 (L)   Hemoglobin 12.0 - 16.0 gm/dL 12.6   Hematocrit 37.0 - 47.0 % 38.2   MCV 80.0 - 94.0 fL 93.6   MCH 27.0 - 31.0 pg 30.9   MCHC 33.0 - 36.0 mg/dL 33.0   RDW 11.5 - 17.0 % 12.5   Platelets 130 - 400 x10(3)/mcL 255   (L): Data is abnormally low       Assessment:       1) Left breast cancer-diagnosed 2005   --lumpectomy/SLNB, XRT, and 5 years of Tamoxifen   2) Mucinous cystosarcoma-Dx 2017    --SP BSO   3) Bilateral breast cancer   --DCIS-clinical anatomic stage IA / prognostic stage IA (cT1b cN0 M0) left breast Dx 3/16/2022   --Clinical anatomic stage IA / prognostic stage IA (cT1b cN0 M0) Right breast    --%, %, Her 2 neg, Ki 67 58%, Grade 1   --Bilateral mastectomies 4/25/2022, Right SLND      Plan:       Started Femara 3/2022. Prolia started in 10/2022.  Baseline DEXA scan with osteoporosis --right femoral neck bone mineral density with a T score of -2.5. Otherwise osteopenia  She will cont Femara for now and re evaluate next Bone density (due 9/2024) If bone mass worsen then with change to Tamoxifen.    Continue Femara  Continue Prolia - next due 04/16/2024  Bone density - due 9/2024  Continue Calcium and Vit D supplements and weight bearing exercises  No breast imaging as she has bilateral mastectomies  RTC  in 3 months with labs: CBC, CMP, CA 27-29    Encouraged to call with questions or problems  The patient was given ample opportunity to ask questions and they were all answered to satisfaction; patient demonstrated understanding of what we discussed and is agreeable to the plan.    NEREIDA Robert

## 2024-01-30 DIAGNOSIS — C50.211 MALIGNANT NEOPLASM OF UPPER-INNER QUADRANT OF RIGHT BREAST IN FEMALE, ESTROGEN RECEPTOR POSITIVE: ICD-10-CM

## 2024-01-30 DIAGNOSIS — Z17.0 MALIGNANT NEOPLASM OF UPPER-INNER QUADRANT OF RIGHT BREAST IN FEMALE, ESTROGEN RECEPTOR POSITIVE: ICD-10-CM

## 2024-01-30 RX ORDER — LETROZOLE 2.5 MG/1
2.5 TABLET, FILM COATED ORAL DAILY
Qty: 30 TABLET | Refills: 2 | Status: SHIPPED | OUTPATIENT
Start: 2024-01-30 | End: 2024-05-13 | Stop reason: SDUPTHER

## 2024-02-15 ENCOUNTER — OFFICE VISIT (OUTPATIENT)
Dept: SURGERY | Facility: CLINIC | Age: 69
End: 2024-02-15
Payer: MEDICARE

## 2024-02-15 VITALS
HEIGHT: 64 IN | RESPIRATION RATE: 18 BRPM | BODY MASS INDEX: 37.22 KG/M2 | HEART RATE: 62 BPM | WEIGHT: 218 LBS | DIASTOLIC BLOOD PRESSURE: 84 MMHG | OXYGEN SATURATION: 96 % | SYSTOLIC BLOOD PRESSURE: 145 MMHG | TEMPERATURE: 98 F

## 2024-02-15 DIAGNOSIS — Z90.13 HISTORY OF BILATERAL MASTECTOMY: ICD-10-CM

## 2024-02-15 DIAGNOSIS — D05.12 DUCTAL CARCINOMA IN SITU (DCIS) OF LEFT BREAST: ICD-10-CM

## 2024-02-15 DIAGNOSIS — C50.111 MALIGNANT NEOPLASM OF CENTRAL PORTION OF RIGHT BREAST IN FEMALE, ESTROGEN RECEPTOR POSITIVE: Primary | ICD-10-CM

## 2024-02-15 DIAGNOSIS — Z17.0 MALIGNANT NEOPLASM OF CENTRAL PORTION OF RIGHT BREAST IN FEMALE, ESTROGEN RECEPTOR POSITIVE: Primary | ICD-10-CM

## 2024-02-15 PROCEDURE — 1160F RVW MEDS BY RX/DR IN RCRD: CPT | Mod: CPTII,S$GLB,, | Performed by: PHYSICIAN ASSISTANT

## 2024-02-15 PROCEDURE — 3288F FALL RISK ASSESSMENT DOCD: CPT | Mod: CPTII,S$GLB,, | Performed by: PHYSICIAN ASSISTANT

## 2024-02-15 PROCEDURE — 1159F MED LIST DOCD IN RCRD: CPT | Mod: CPTII,S$GLB,, | Performed by: PHYSICIAN ASSISTANT

## 2024-02-15 PROCEDURE — 3079F DIAST BP 80-89 MM HG: CPT | Mod: CPTII,S$GLB,, | Performed by: PHYSICIAN ASSISTANT

## 2024-02-15 PROCEDURE — 3077F SYST BP >= 140 MM HG: CPT | Mod: CPTII,S$GLB,, | Performed by: PHYSICIAN ASSISTANT

## 2024-02-15 PROCEDURE — 1101F PT FALLS ASSESS-DOCD LE1/YR: CPT | Mod: CPTII,S$GLB,, | Performed by: PHYSICIAN ASSISTANT

## 2024-02-15 PROCEDURE — 1126F AMNT PAIN NOTED NONE PRSNT: CPT | Mod: CPTII,S$GLB,, | Performed by: PHYSICIAN ASSISTANT

## 2024-02-15 PROCEDURE — 99214 OFFICE O/P EST MOD 30 MIN: CPT | Mod: S$GLB,,, | Performed by: PHYSICIAN ASSISTANT

## 2024-02-15 PROCEDURE — 3008F BODY MASS INDEX DOCD: CPT | Mod: CPTII,S$GLB,, | Performed by: PHYSICIAN ASSISTANT

## 2024-02-15 PROCEDURE — 99999 PR PBB SHADOW E&M-EST. PATIENT-LVL IV: CPT | Mod: PBBFAC,,, | Performed by: PHYSICIAN ASSISTANT

## 2024-02-15 NOTE — PROGRESS NOTES
[facility] Breast Surg  Breast Surgical Oncology  Follow-Up Patient Office Visit       Referring Provider: No ref. provider found   PCP: Chely Cameron MD   Care Team:   Medical Oncologist: Judi Richards MD   Radiation Oncologist: No care team member to display   OBGYN: No data on file.     Chief Complaint:   Chief Complaint   Patient presents with    Follow-up     Patient reports no breast related concerns       Subjective:     Treatment History:  Left Breast Cancer Dx in 2005:  1. Left Lumpectomy/SLNB  2. XRT  3. 5 years of Tamoxifen      Bilateral Breast Cancer Diagnosed in 2022  1. Bilateral Simple Mastectomy and Right [place] Lymph Node Biopsy 4/25/2022  2. Oncotype Dx RR 18, no overall benefit from chemotherapy  3. Endocrine therapy started 6/2022, expected for 5 years  4. [facility] Genetic Testing 5/17/2022 - Negative     Interval History:   02/15/2024 - Riana Pastrana returns today for 6 month follow up of breast cancer. She is doing well and has no breast/mastectomy concerns. Last visit, she reported tightness across the chest wall and stretching/exercises were advised. She states she did the exercises and symptoms have since resolved.     HPI:  Riana Pastrana initially presents on 03/29/22 at age 66 Years with a past history of left breast cancer diagnosed in 2005 SP lumpectomy/SLNB, XRT, and 5 years of Tamoxifen as well as recent mucinous cystosarcoma SP BSO diagnosed in 2017 who now presents with bilateral breast cancer. (1) AJCC 8th ed clinical anatomic stage IA / prognostic stage IA (cT1b cN0 M0) Right breast 3 o'clock subareolar invasive ductal carcinoma, grade 1, %,  %, HER2 0 - negative on IHC and Ki67 58%. (2) AJCC 8th ed clinical anatomic stage 0 / prognostic stage 0 (cTis cN0 M0) Left breast central posterior depth ductal carcinoma in situ, grade 1, % and %.     She is s/p bilateral simple mastectomy and right sentinel  lymph node biopsy. Surgical pathology of the right breast revealed invasive ductal carcinoma grade 1 measuring 4 mm. Margins negative. Four right sentinel lymph nodes are negative for malignancy (0/4). Surgical pathology of the left breast revealed central region/6:00 biopsy site with diminutive focus of residual low grade DCIS. Margins also negative.  She has started endocrine therapy and had genetic testing which was negative.    A detailed patient history was obtained and reviewed.     Imagin. 2022 BL SC MG at [facility]-which revealed on R MG lower inner quadrant anterior depth, there are two focal asymmetries. On L MG central region posterior depth there are indeterminate calcifications. At 12 o'clock left breast middle depth, there is a focal asymmetry with calcifications. There are stable post-therapy changes of the left breast. No detrimental change at the lumpectomy bed. There are stable post-core needle biopsy changes and a jesús shaped clip in the right breast. No detrimental change at this biopsy site. No other significant masses, calcifications, or other findings are seen in either breast. BIRADS-0 ; additional imaging needed.    2. 2022 BL DG MG/ BL US BREAST LIMITED at [facility]- which revealed on R MG at 4 o'clock subareolar anterior there is a 0.7 cm oval, circumscribed mass. On L MG 12 o'clock, anterior depth, there is a 1.2 cm focal asymmetry with associated heterogeneous calcifications. These findings correspond to the areas recalled on the screening examination dated 2022. No other suspicious masses, calcifications, or other signs of malignancy are identified. On R US, at 3 o'clock subareolar there is a 0.6 x 0.4 x 0.4 cm oval, hypoechoic mass with indistinct margins. At 4 o'clock subareolar right breast, there is a 0.7 x 0.3 x 0.6 cm benign, simple cyst. These correspond to the mammographic findings in these regions. No other suspicious sonographic abnormality is seen. Specifically, no  suspicious sonographic correlate is seen for the focal asymmetry in the 12 o'clock left breast. Benign-appearing lymph nodes are noted in the bilateral axillae. BIRADS-4 suspicious; biopsy recommended.     Pathology:  1. 3/16/2022 Ultrasound-guided Core Needle Biopsy Right Breast 3 o'clock Subareolar Mass - Invasive ductal carcinoma, grade 1  %  %  HER2 Negative  Ki67 - 58% High    2.3/16/2022 Stereotactic guided Core Needle Biopsy Left Breast Posterior Depth Amorphous Grouped Calcifications Central Region - Ductal carcinoma in situ, grade 1  %  TX 99%    3. 3/16/2022 Stereotactic guided Core Needle Biopsy Left Breast 12 o'clock Anterior Depth Focal Asymmetry with Heterogenous Calcifications - Sclerotic fibroadenomatoid nodule with dystropic calcification     4. 4/25/2022 Bilateral Simple Mastectomy and Right SLNB - Surgical pathology of the right breast revealed invasive ductal carcinoma grade 1 measuring 4 mm. Margins negative. Four right sentinel lymph nodes are negative for malignancy (0/4). Surgical pathology of the left breast revealed central region/6:00 biopsy site with diminutive focus of residual low grade DCIS. Margins also negative.      OB/GYN History:  Menarche Onset: 13  Menopause: Post, at age:  Hormonal birth control (duration): yes  Pregnancies: 3  Age at first pregnancy: ?  Child births: 2  Hysterectomy: yes, partial hysterectomy at age 29  Oophorectomy: yes, 2017 after diagnosis of adnexal mass suspicious - final pathology after BSO was mucinous cystosarcoma  HRT: no     Other:  # of breast biopsies (when and pathology results): 1 Left breast cancer in 2005  MG breast density: Category B (Scattered fibroglandular)  Prior thoracic RT: none  Genetic testing: none  Ashkenazi Yazidism descent: No    Family History:  Family History   Problem Relation Age of Onset    Ovarian cancer Sister 50        Patient History:  Past Medical History:   Diagnosis Date    History of bilateral breast  cancer     left in 2007; bilateral in 2022    Hyperlipidemia 5/13/2022    Hypertension     Hypothyroidism 5/13/2022    Severe obesity (BMI 35.0-39.9) with comorbidity 5/13/2022       Past Surgical History:   Procedure Laterality Date    BREAST BIOPSY      BREAST SURGERY      HYSTERECTOMY      TONSILLECTOMY         Social History     Socioeconomic History    Marital status:    Tobacco Use    Smoking status: Never    Smokeless tobacco: Never   Substance and Sexual Activity    Alcohol use: Never    Drug use: Never     Social Determinants of Health     Financial Resource Strain: Low Risk  (7/27/2023)    Overall Financial Resource Strain (CARDIA)     Difficulty of Paying Living Expenses: Not hard at all   Food Insecurity: No Food Insecurity (7/27/2023)    Hunger Vital Sign     Worried About Running Out of Food in the Last Year: Never true     Ran Out of Food in the Last Year: Never true   Transportation Needs: No Transportation Needs (7/27/2023)    PRAPARE - Transportation     Lack of Transportation (Medical): No     Lack of Transportation (Non-Medical): No   Physical Activity: Sufficiently Active (7/27/2023)    Exercise Vital Sign     Days of Exercise per Week: 7 days     Minutes of Exercise per Session: 60 min   Stress: No Stress Concern Present (7/27/2023)    [place] citizen [place] of Occupational Health - Occupational Stress Questionnaire     Feeling of Stress : Not at all   Social Connections: Socially Integrated (7/27/2023)    Social Connection and Isolation Panel [NHANES]     Frequency of Communication with Friends and Family: More than three times a week     Frequency of Social Gatherings with Friends and Family: More than three times a week     Attends Ep[employer] Services: More than 4 times per year     Active Member of Clubs or Organizations: Yes     Attends Club or Organization Meetings: More than 4 times per year     Marital Status:    Housing Stability: Unknown (7/27/2023)    Housing Stability Vital  Sign     Unable to Pay for Housing in the Last Year: No     Unstable Housing in the Last Year: No       Immunization History   Administered Date(s) Administered    COVID-19, MRNA, LN-S, PF (Pfizer) (Purple Cap) 02/25/2021, 03/19/2021, 11/01/2021    COVID-19, mRNA, LNP-S, PF (Moderna 2023)Ages 12+ 10/25/2023    COVID-19, mRNA, LNP-S, bivalent booster, PF (PFIZER OMICRON) 10/26/2022    Influenza - Quadrivalent - High Dose - PF (65 years and older) 11/01/2021, 10/26/2022, 10/25/2023    Influenza - Quadrivalent - PF (6-35 months) 10/01/2018    Influenza - Quadrivalent - PF *Preferred* (6 months and older) 11/07/2019    Pneumococcal Conjugate - 13 Valent 11/12/2021    Pneumococcal Polysaccharide - 23 Valent 11/23/2022    RSVpreF (Arexvy) 10/25/2023       Medications/Allergies:  Current Outpatient Medications on File Prior to Visit   Medication Sig Dispense Refill    atorvastatin (LIPITOR) 20 MG tablet TAKE 1 TABLET EVERY DAY 90 tablet 2    denosumab (PROLIA) 60 mg/mL Syrg Inject 60 mg into the skin every 6 (six) months.      ergocalciferol (ERGOCALCIFEROL) 50,000 unit Cap Take 1 capsule (50,000 Units total) by mouth twice a week. 24 capsule 3    furosemide (LASIX) 20 MG tablet TAKE 1 TABLET EVERY DAY AS DIRECTED (Patient taking differently: Take 20 mg by mouth once daily.) 90 tablet 3    hydroCHLOROthiazide (HYDRODIURIL) 25 MG tablet TAKE 1 TABLET EVERY DAY 90 tablet 3    letrozole (FEMARA) 2.5 mg Tab Take 1 tablet (2.5 mg total) by mouth once daily. 30 tablet 2    levothyroxine (SYNTHROID) 50 MCG tablet TAKE 1 TABLET BY MOUTH BEFORE BREAKFAST. 90 tablet 1    meloxicam (MOBIC) 7.5 MG tablet TAKE 1 TABLET EVERY DAY AS NEEDED FOR PAIN 30 tablet 10    metoprolol tartrate (LOPRESSOR) 25 MG tablet TAKE 1 TABLET TWICE DAILY 180 tablet 3    multivit-min/iron/folic/lutein (CENTRUM SILVER WOMEN ORAL) Take 1 tablet by mouth once daily.      potassium chloride (MICRO-K) 10 MEQ CpSR TAKE 1 CAPSULE ONE TIME DAILY 90 capsule 3     No  current facility-administered medications on file prior to visit.       Review of patient's allergies indicates:   Allergen Reactions    Penicillins Other (See Comments)     UNKNOWN REACTION. ALLERGY SINCE CHILDHOOD         Review of Systems:  Pertinent items are noted in HPI.     Objective:     Vitals:  Vitals:    02/15/24 0815   BP: (!) 145/84   Pulse: 62   Resp: 18   Temp: 97.9 °F (36.6 °C)       Body mass index is 37.42 kg/m².     Physical Exam:  General: The patient is awake, alert and oriented times three. The patient is well nourished and in no acute distress.  Neck: There is no evidence of palpable cervical, supraclavicular or axillary adenopathy. The neck is supple. The thyroid is not enlarged.  Musculoskeletal: The patient has a normal range of motion of her bilateral upper extremities.  Chest: Examination of the chest wall fails to reveal any obvious abnormalities. Nonlabored breathing, symmetric expansion.  Breast: Examination of the Mastectomy sites bilaterally fails to reveal any dominant masses or areas of significant focal nodularity. The nipples are surgically absent bilaterally. There are no significant skin changes overlying the breasts. There is no skin dimpling with movement of the pectoralis.  Abdomen: The abdomen is soft, flat, nontender and nondistended.  Integumentary: no rashes or skin lesions present  Neurologic: cranial nerves intact, no signs of peripheral neurological deficit, motor/sensory function intact      Assessment and Plan:       There are no diagnoses linked to this encounter.          Plan:       Healthy lifestyle guidelines were reviewed. She was encouraged to engage in regular exercise, maintain a healthy body weight, and avoid excessive alcohol consumption. Healthy nutritional guidelines were also discussed. Self-breast examination was reviewed with the patient in detail and she was encouraged to perform this on a monthly basis.    Continue endocrine therapy and follow up  with medical oncology.    RTC in 1 year for CBE. No indication for mammograms in patients s/p bilateral mastectomy.    Other routine screening exams: next colonoscopy due 2028. S/p total hysterectomy therefore no indication for pap smears. She continues regular follow up with her PCP.        All of her questions were answered. She was advised to call if she develops any questions or concerns.    Sirena Lee PA-C              Total time on the date of the visit ranged from 30-39 mins ([id number]). Total time includes both face-to-face and non-face-to-face time personally spent by myself on the day of the visit.    Non-face-to-face time included:  _X_ preparing to see the patient such as reviewing the patient record  __ obtaining and reviewing separately obtained history  _X_ independently interpreting results  _X_ documenting clinical information in electronic health record.    Face-to-face time included:  _X_ performing an appropriate history and examination  _X_ communicating results to the patient  _X_ counseling and educating the patient  __ ordering appropriate medications  _x_ ordering appropriate tests  _X_ ordering appropriate procedures (including follow-up)  _X_ answering any questions the patient had    Total Time spent on date of visit: 30 minutes

## 2024-02-24 DIAGNOSIS — N25.81 SECONDARY HYPERPARATHYROIDISM: ICD-10-CM

## 2024-02-26 RX ORDER — LEVOTHYROXINE SODIUM 50 UG/1
50 TABLET ORAL
Qty: 90 TABLET | Refills: 3 | Status: SHIPPED | OUTPATIENT
Start: 2024-02-26

## 2024-03-11 PROBLEM — Z00.00 MEDICARE ANNUAL WELLNESS VISIT, SUBSEQUENT: Status: RESOLVED | Noted: 2022-11-23 | Resolved: 2024-03-11

## 2024-03-15 DIAGNOSIS — E03.9 HYPOTHYROIDISM, UNSPECIFIED TYPE: Primary | ICD-10-CM

## 2024-03-22 ENCOUNTER — LAB VISIT (OUTPATIENT)
Dept: LAB | Facility: HOSPITAL | Age: 69
End: 2024-03-22
Attending: NURSE PRACTITIONER
Payer: MEDICARE

## 2024-03-22 DIAGNOSIS — Z79.811 USE OF LETROZOLE (FEMARA): ICD-10-CM

## 2024-03-22 DIAGNOSIS — Z51.81 THERAPEUTIC DRUG MONITORING: ICD-10-CM

## 2024-03-22 DIAGNOSIS — Z17.0 MALIGNANT NEOPLASM OF UPPER-INNER QUADRANT OF RIGHT BREAST IN FEMALE, ESTROGEN RECEPTOR POSITIVE: ICD-10-CM

## 2024-03-22 DIAGNOSIS — C50.211 MALIGNANT NEOPLASM OF UPPER-INNER QUADRANT OF RIGHT BREAST IN FEMALE, ESTROGEN RECEPTOR POSITIVE: ICD-10-CM

## 2024-03-22 LAB
ALBUMIN SERPL-MCNC: 3.9 G/DL (ref 3.4–4.8)
ALBUMIN/GLOB SERPL: 1.2 RATIO (ref 1.1–2)
ALP SERPL-CCNC: 103 UNIT/L (ref 40–150)
ALT SERPL-CCNC: 45 UNIT/L (ref 0–55)
AST SERPL-CCNC: 51 UNIT/L (ref 5–34)
BASOPHILS # BLD AUTO: 0.05 X10(3)/MCL
BASOPHILS NFR BLD AUTO: 0.7 %
BILIRUB SERPL-MCNC: 0.5 MG/DL
BUN SERPL-MCNC: 16.7 MG/DL (ref 9.8–20.1)
CALCIUM SERPL-MCNC: 10.1 MG/DL (ref 8.4–10.2)
CHLORIDE SERPL-SCNC: 101 MMOL/L (ref 98–107)
CO2 SERPL-SCNC: 30 MMOL/L (ref 23–31)
CREAT SERPL-MCNC: 0.75 MG/DL (ref 0.55–1.02)
EOSINOPHIL # BLD AUTO: 0.12 X10(3)/MCL (ref 0–0.9)
EOSINOPHIL NFR BLD AUTO: 1.8 %
ERYTHROCYTE [DISTWIDTH] IN BLOOD BY AUTOMATED COUNT: 12.8 % (ref 11.5–17)
GFR SERPLBLD CREATININE-BSD FMLA CKD-EPI: >60 MLS/MIN/1.73/M2
GLOBULIN SER-MCNC: 3.3 GM/DL (ref 2.4–3.5)
GLUCOSE SERPL-MCNC: 127 MG/DL (ref 82–115)
HCT VFR BLD AUTO: 37.7 % (ref 37–47)
HGB BLD-MCNC: 12.9 G/DL (ref 12–16)
IMM GRANULOCYTES # BLD AUTO: 0.01 X10(3)/MCL (ref 0–0.04)
IMM GRANULOCYTES NFR BLD AUTO: 0.1 %
LYMPHOCYTES # BLD AUTO: 2.1 X10(3)/MCL (ref 0.6–4.6)
LYMPHOCYTES NFR BLD AUTO: 31.2 %
MCH RBC QN AUTO: 31.2 PG (ref 27–31)
MCHC RBC AUTO-ENTMCNC: 34.2 G/DL (ref 33–36)
MCV RBC AUTO: 91.1 FL (ref 80–94)
MONOCYTES # BLD AUTO: 0.7 X10(3)/MCL (ref 0.1–1.3)
MONOCYTES NFR BLD AUTO: 10.4 %
NEUTROPHILS # BLD AUTO: 3.76 X10(3)/MCL (ref 2.1–9.2)
NEUTROPHILS NFR BLD AUTO: 55.8 %
PLATELET # BLD AUTO: 191 X10(3)/MCL (ref 130–400)
PMV BLD AUTO: 10.7 FL (ref 7.4–10.4)
POTASSIUM SERPL-SCNC: 3.5 MMOL/L (ref 3.5–5.1)
PROT SERPL-MCNC: 7.2 GM/DL (ref 5.8–7.6)
RBC # BLD AUTO: 4.14 X10(6)/MCL (ref 4.2–5.4)
SODIUM SERPL-SCNC: 142 MMOL/L (ref 136–145)
WBC # SPEC AUTO: 6.74 X10(3)/MCL (ref 4.5–11.5)

## 2024-03-22 PROCEDURE — 85025 COMPLETE CBC W/AUTO DIFF WBC: CPT

## 2024-03-22 PROCEDURE — 80053 COMPREHEN METABOLIC PANEL: CPT

## 2024-03-22 PROCEDURE — 86300 IMMUNOASSAY TUMOR CA 15-3: CPT

## 2024-03-22 PROCEDURE — 36415 COLL VENOUS BLD VENIPUNCTURE: CPT

## 2024-03-25 LAB — CANCER AG27-29 SERPL-ACNC: 18.7 U/ML

## 2024-03-28 ENCOUNTER — OFFICE VISIT (OUTPATIENT)
Dept: HEMATOLOGY/ONCOLOGY | Facility: CLINIC | Age: 69
End: 2024-03-28
Payer: MEDICARE

## 2024-03-28 VITALS
DIASTOLIC BLOOD PRESSURE: 79 MMHG | WEIGHT: 217.81 LBS | TEMPERATURE: 98 F | RESPIRATION RATE: 17 BRPM | OXYGEN SATURATION: 95 % | SYSTOLIC BLOOD PRESSURE: 155 MMHG | HEART RATE: 61 BPM | HEIGHT: 64 IN | BODY MASS INDEX: 37.19 KG/M2

## 2024-03-28 DIAGNOSIS — C50.211 MALIGNANT NEOPLASM OF UPPER-INNER QUADRANT OF RIGHT BREAST IN FEMALE, ESTROGEN RECEPTOR POSITIVE: Primary | ICD-10-CM

## 2024-03-28 DIAGNOSIS — Z79.811 USE OF LETROZOLE (FEMARA): ICD-10-CM

## 2024-03-28 DIAGNOSIS — Z51.81 THERAPEUTIC DRUG MONITORING: ICD-10-CM

## 2024-03-28 DIAGNOSIS — Z17.0 MALIGNANT NEOPLASM OF UPPER-INNER QUADRANT OF RIGHT BREAST IN FEMALE, ESTROGEN RECEPTOR POSITIVE: Primary | ICD-10-CM

## 2024-03-28 PROCEDURE — 99214 OFFICE O/P EST MOD 30 MIN: CPT | Mod: S$GLB,,, | Performed by: NURSE PRACTITIONER

## 2024-03-28 PROCEDURE — 3078F DIAST BP <80 MM HG: CPT | Mod: CPTII,S$GLB,, | Performed by: NURSE PRACTITIONER

## 2024-03-28 PROCEDURE — 1159F MED LIST DOCD IN RCRD: CPT | Mod: CPTII,S$GLB,, | Performed by: NURSE PRACTITIONER

## 2024-03-28 PROCEDURE — 1101F PT FALLS ASSESS-DOCD LE1/YR: CPT | Mod: CPTII,S$GLB,, | Performed by: NURSE PRACTITIONER

## 2024-03-28 PROCEDURE — 99999 PR PBB SHADOW E&M-EST. PATIENT-LVL IV: CPT | Mod: PBBFAC,,, | Performed by: NURSE PRACTITIONER

## 2024-03-28 PROCEDURE — 3008F BODY MASS INDEX DOCD: CPT | Mod: CPTII,S$GLB,, | Performed by: NURSE PRACTITIONER

## 2024-03-28 PROCEDURE — 3288F FALL RISK ASSESSMENT DOCD: CPT | Mod: CPTII,S$GLB,, | Performed by: NURSE PRACTITIONER

## 2024-03-28 PROCEDURE — 1126F AMNT PAIN NOTED NONE PRSNT: CPT | Mod: CPTII,S$GLB,, | Performed by: NURSE PRACTITIONER

## 2024-03-28 PROCEDURE — 3077F SYST BP >= 140 MM HG: CPT | Mod: CPTII,S$GLB,, | Performed by: NURSE PRACTITIONER

## 2024-03-28 NOTE — PROGRESS NOTES
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 2022 BL SC MG at [facility]-which revealed on R MG lower inner quadrant anterior depth, there are two focal asymmetries. On L MG central region posterior depth there are indeterminate calcifications. At 12 o'clock left breast middle depth, there is a focal asymmetry with calcifications. There are stable post-therapy changes of the left breast. No detrimental change at the lumpectomy bed. There are stable post-core needle biopsy changes and a jesús shaped clip in the right breast. No detrimental change at this biopsy site. No other significant masses, calcifications, or other findings are seen in either breast. BIRADS-0 ; additional imaging needed.    2. 2022 BL DG MG/ BL US BREAST LIMITED at [facility]- which revealed on R MG at 4 o'clock subareolar anterior there is a 0.7 cm oval, circumscribed mass. On L MG 12 o'clock, anterior depth, there is a 1.2 cm focal asymmetry with associated heterogeneous calcifications. These findings correspond to the areas recalled on the screening examination dated 2022. No other suspicious masses, calcifications, or other signs of malignancy are  identified. On R US, at 3 o'clock subareolar there is a 0.6 x 0.4 x 0.4 cm oval, hypoechoic mass with indistinct margins. At 4 o'clock subareolar right breast, there is a 0.7 x 0.3 x 0.6 cm benign, simple cyst. These correspond to the mammographic findings in these regions. No other suspicious sonographic abnormality is seen. Specifically, no suspicious sonographic correlate is seen for the focal asymmetry in the 12 o'clock left breast. Benign-appearing lymph nodes are noted in the bilateral axillae. BIRADS-4 suspicious; biopsy recommended.    Bone density 9/21/22:   LUMBAR SPINE The L1 to L3 vertebral bone mineral density is equal to 1.121 g/cm squared with a T score of -0.5.  LEFT HIP The left femoral neck bone mineral density is equal to 0.744 g/cm squared with a T score of -2.1.   The left total hip bone mineral density is equal to 0.816 g/cm squared with a T score of -1.5.   RIGHT HIP The right femoral neck bone mineral density is equal to 0.694 g/cm squared with a T score of -2.5.  The right total hip bone mineral density is equal to 0.824 g/cm squared with a T score of -1.5.  FRAX 10-YEAR PROBABILITY OF FRACTURE: 21.4% risk of a major osteoporotic fracture and 2.9% risk of hip fracture in the next 10 years    Impression: Osteoporosis      Pathology:  3/16/2022:   [1] LEFT BREAST POSTERIOR DEPTH AMORPHOUS GROUPED CALCIFICATIONS CENTRAL REGION: DUCTAL CARCINOMA IN SITU, LOW GRADE CRIBRIFORM/MICROPAPILLARY TYPE, WITH MICROCALCIFICATIONS. DCIS is present on two core biopsies, the largest focus measuring less than 3 mm.   [2] LEFT BREAST 12 O' CLOCK ANTERIOR DEPTH FOCAL ASYMMETRY WITH HETEROGENOUS CALCIFICATIONS:SCLEROTIC FIBROADENOMATOID NODULE WITH DYSTROPIC CALCIFICATION.   [3] RIGHT BREAST 3 O' CLOCK SUBAREOLAR MASS: INVASIVE DUCTAL CARCINOMA, SOTO-ROMERO GRADE 1.  Carcinoma is present on two core biopsies and measures 5 mm.    %, %, Her2 neg, Ki67 58%.    4/25/2022:  [1]  BREAST, LEFT, SIMPLE  MASTECTOMY: DIMINUTIVE FOCUS OF RESIDUAL LOW GRADE DUCTAL CARCINOMA IN SITU.  [2]  BREAST, RIGHT, SIMPLE MASTECTOMY: INVASIVE DUCTAL CARCINOMA, LOW GRADE, GRADE 1(OF 3).  -Tumor size:  4.0 mm. pT Category:  pT1a  pN0  BREAST, RIGHT, AXILLARY SENTINEL NODE #4/4: NEGATIVE FOR METASTATIC CARCINOMA.  The patient's previous history of low grade invasive ductal carcinoma is noted from surgical pathology report [id number] ER (100%), ND (100%), HER2 negative, Ki-67 high (58%).         CLINICAL HISTORY:       Patient: Riana Pastrana is a 68 y.o. female kindly referred for history of breast cancer.  I do not have record of her diagnosis and treatment of her original breast cancer.    Patient states that she was diagnosed with breast cancer in 2005.  She had Left lumpectomy done, with no chemotherapy or radiation. She took tamoxifen for 5 years. She reports that she was treated by Dr. Robbin Kenny. She says that she was seen at [facility], but since her insurance has changed she would like to establish care here.     Patient reports menarche at 13. She had 3 pregnancies, 2 live children, 1 miscarriage. Her first child was at age 20.She had a partial hysterectomy at age 29. She admits to oral contraceptives for about 3 years. No hormone replacement therapy.     Her sister had ovarian cancer diagnosed in her 50's, currently still alive at age 80.    She reports that she had a mass in her stomach and it was removed in 2017 along with her ovaries at [facility].  Again I do not have the records, but imaging studies none here include CT scan of the abdomen and pelvis done on 5/21/2017 for an abdominal distention showed a 22 x 21 x 16 cm complex septated peripherally enhancing mass which appears to arise from the right adnexa, presumably ovarian in origin.  Uterus not identified.  Cystic mass in the abdomen and pelvis displaces the urinary bladder inferiorly.  Liver is is heterogeneous in attenuation and contains 2  low-attenuation masses in the dome, too small to accurately characterize by CT.  Spleen normal in size. CT of the chest showed cardiomegaly with moderate pericardial effusion.  Bilateral lower lobe consolidation with atelectasis and bilateral pleural effusions, left greater than right. Large abdominal cystic mass of unknown etiology. Further assessment with dedicated imaging of the abdomen and pelvis recommended. US pelvis 5/22/2021:  A large, multilocular mass consistent with the CT findings is identified measuring 27.5 x 20.1 x 19.6 cm. There are multiple septations or separate fluid loculations in the large cystic mass. Low level internal echogenicity is also visible in the cyst fluid with dependent debris also identified. No free pelvic fluid is visible. This lesion is suspicious for neoplasm of pelvic and likely ovarian origin. The uterus is not identified and surgically absent by patient history. 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  Ascites  suggestive of peritoneal implants,  the ascites is new from the previous study. No Path available    On 2/7/2022 screening mammogram: INCOMPLETE: NEEDS ADDITIONAL IMAGING EVALUATION  Right breast focal asymmetries, left breast calcifications, and left breast focal asymmetry with calcifications need further evaluation. BI-RADS 0: Incomplete. Need additional imaging evaluation.     On 2/24/2022 Diagnostic right mammogram and Breast US: SUSPICIOUS OF MALIGNANCY  1. Oval, hypoechoic mass with indistinct margins in the 3:00 subareolar right breast is suspicious. Right breast ultrasound-guided biopsy is recommended.  2. Amorphous, grouped calcifications in the central left breast, posterior depth, are suspicious. Left breast stereotactic guided biopsy is recommended.  3. Focal asymmetry with associated heterogeneous calcifications in the 12:00 left breast, anterior depth, is suspicious. Left breast stereotactic guided  biopsy is recommended.     On 3/16/2022 she is schedule for breast bx. otherwise she is doing well and voices no concerns.  No fever, chills, sweats.  No chest pain or shortness of breath.  No changes in bowel habits.  No abdominal or pelvic pain.  No neurological symptoms.    [1] LEFT BREAST POSTERIOR DEPTH AMORPHOUS GROUPED CALCIFICATIONS CENTRAL REGION:  DUCTAL CARCINOMA IN SITU, LOW GRADE CRIBRIFORM/MICROPAPILLARY TYPE, WITH MICROCALCIFICATIONS.  COMMENT: DCIS is present on two core biopsies, the largest focus measuring less than 3 mm. The results of ER/NE analysis will be the subject of an addendum report.  [2] LEFT BREAST 12 O' CLOCK ANTERIOR DEPTH FOCAL ASYMMETRY WITH HETEROGENOUS CALCIFICATIONS:  SCLEROTIC FIBROADENOMATOID NODULE WITH DYSTROPIC CALCIFICATION.  [3] RIGHT BREAST 3 O' CLOCK SUBAREOLAR MASS:  INVASIVE DUCTAL CARCINOMA, SOTO-ROMERO GRADE 1.  COMMENT: Carcinoma is present on two core biopsies and measures 5 mm.    %, %, Her2 neg, Ki67 58%.      Oncotype showed a recurrence score of 18 with a distant recurrent risk at 9 years of 5% with AI or tamoxifen alone and <1% absolute chemotherapy benefit.     Chief Complaint: 3 Month Follow Up          Interval History:  Patient presents today for follow up in surveillance of her breast cancer. She is taking Letrozole and tolerating well (started March 2022). She is also taking Prolia, next due on 4/16/24. Overall, she is doing well. She denies any fever, chills, sweats. No chest pain or shortness of breath.  No changes in bowel habits.  She has mild intermittent pain to her right hand. Reports tightness in the joints of her right hand. This is chronic and tolerable.     Wt Readings from Last 7 Encounters:   03/28/24 98.8 kg (217 lb 12.8 oz)   02/15/24 98.9 kg (218 lb)   12/28/23 99.6 kg (219 lb 9.6 oz)   12/08/23 98.9 kg (218 lb)   08/15/23 101.5 kg (223 lb 12.8 oz)   07/27/23 100.2 kg (221 lb)   06/28/23 99.4 kg (219 lb 1.6 oz)    ]      Past Medical History:   Diagnosis Date    History of bilateral breast cancer     left in 2007; bilateral in 2022    Hyperlipidemia 5/13/2022    Hypertension     Hypothyroidism 5/13/2022    Severe obesity (BMI 35.0-39.9) with comorbidity 5/13/2022      Past Surgical History:   Procedure Laterality Date    BREAST BIOPSY      BREAST SURGERY      HYSTERECTOMY      TONSILLECTOMY       Family History   Problem Relation Age of Onset    Ovarian cancer Sister 50     Social Connections: Socially Integrated (7/27/2023)    Social Connection and Isolation Panel [NHANES]     Frequency of Communication with Friends and Family: More than three times a week     Frequency of Social Gatherings with Friends and Family: More than three times a week     Attends Jewish Services: More than 4 times per year     Active Member of Clubs or Organizations: Yes     Attends Club or Organization Meetings: More than 4 times per year     Marital Status:        Review of patient's allergies indicates:   Allergen Reactions    Penicillins Other (See Comments)     UNKNOWN REACTION. ALLERGY SINCE CHILDHOOD        Current Outpatient Medications on File Prior to Visit   Medication Sig Dispense Refill    atorvastatin (LIPITOR) 20 MG tablet TAKE 1 TABLET EVERY DAY 90 tablet 2    denosumab (PROLIA) 60 mg/mL Syrg Inject 60 mg into the skin every 6 (six) months.      ergocalciferol (ERGOCALCIFEROL) 50,000 unit Cap Take 1 capsule (50,000 Units total) by mouth twice a week. 24 capsule 3    furosemide (LASIX) 20 MG tablet TAKE 1 TABLET EVERY DAY AS DIRECTED (Patient taking differently: Take 20 mg by mouth once daily.) 90 tablet 3    hydroCHLOROthiazide (HYDRODIURIL) 25 MG tablet TAKE 1 TABLET EVERY DAY 90 tablet 3    letrozole (FEMARA) 2.5 mg Tab Take 1 tablet (2.5 mg total) by mouth once daily. 30 tablet 2    levothyroxine (SYNTHROID) 50 MCG tablet TAKE 1 TABLET BEFORE BREAKFAST. 90 tablet 3    meloxicam (MOBIC) 7.5 MG tablet TAKE 1 TABLET EVERY  DAY AS NEEDED FOR PAIN 30 tablet 10    metoprolol tartrate (LOPRESSOR) 25 MG tablet TAKE 1 TABLET TWICE DAILY 180 tablet 3    multivit-min/iron/folic/lutein (CENTRUM SILVER WOMEN ORAL) Take 1 tablet by mouth once daily.      potassium chloride (MICRO-K) 10 MEQ CpSR TAKE 1 CAPSULE ONE TIME DAILY 90 capsule 3     No current facility-administered medications on file prior to visit.      Review of Systems   Constitutional:  Negative for activity change, appetite change, chills, fatigue, fever and unexpected weight change.   HENT:  Negative for mouth dryness, mouth sores, nosebleeds, sore throat and trouble swallowing.    Eyes:  Negative for visual disturbance.   Respiratory:  Negative for cough, chest tightness and shortness of breath.    Cardiovascular:  Negative for chest pain, palpitations and leg swelling.   Gastrointestinal:  Negative for abdominal distention, abdominal pain, blood in stool, change in bowel habit, constipation, diarrhea, nausea and vomiting.   Endocrine: Negative.    Genitourinary:  Negative for dysuria, frequency, hematuria and urgency.   Musculoskeletal:  Positive for arthralgias. Negative for back pain, myalgias and neck pain.   Integumentary:  Negative for rash.   Neurological:  Negative for dizziness, tremors, syncope, speech difficulty, weakness, light-headedness, numbness, headaches and memory loss.   Hematological:  Does not bruise/bleed easily.   Psychiatric/Behavioral:  Negative for confusion and suicidal ideas.               Vitals:    03/28/24 1251   Weight: 98.8 kg (217 lb 12.8 oz)              Physical Exam  Vitals and nursing note reviewed.   Constitutional:       General: She is not in acute distress.     Appearance: Normal appearance. She is well-developed. She is obese.   HENT:      Head: Normocephalic and atraumatic.      Mouth/Throat:      Mouth: Mucous membranes are moist.   Eyes:      General: No scleral icterus.     Extraocular Movements: Extraocular movements intact.       Conjunctiva/sclera: Conjunctivae normal.      Pupils: Pupils are equal, round, and reactive to light.   Neck:      Vascular: No JVD.   Cardiovascular:      Rate and Rhythm: Normal rate. Rhythm irregularly irregular.      Heart sounds: No murmur heard.  Pulmonary:      Effort: Pulmonary effort is normal.      Breath sounds: Normal breath sounds. No wheezing or rhonchi.   Chest:      Chest wall: No deformity or tenderness.   Breasts:     Right: Absent. No swelling, mass, nipple discharge, skin change or tenderness.      Left: Absent. No swelling, mass, nipple discharge, skin change or tenderness.   Abdominal:      General: Bowel sounds are normal. There is no distension.      Palpations: Abdomen is soft. There is no mass.      Tenderness: There is no abdominal tenderness.   Musculoskeletal:         General: No swelling or deformity.      Cervical back: Neck supple.   Lymphadenopathy:      Head:      Right side of head: No submandibular adenopathy.      Left side of head: No submandibular adenopathy.      Cervical: No cervical adenopathy.      Upper Body:      Right upper body: No supraclavicular or axillary adenopathy.      Left upper body: No supraclavicular or axillary adenopathy.      Lower Body: No right inguinal adenopathy. No left inguinal adenopathy.   Skin:     General: Skin is warm.      Coloration: Skin is not jaundiced.      Findings: No lesion or rash.      Nails: There is no clubbing.   Neurological:      General: No focal deficit present.      Mental Status: She is alert and oriented to person, place, and time.      Cranial Nerves: Cranial nerves 2-12 are intact.      Sensory: Sensation is intact.      Motor: Motor function is intact.      Gait: Gait is intact.   Psychiatric:         Attention and Perception: Attention normal.         Mood and Affect: Mood and affect normal.         Speech: Speech normal.         Behavior: Behavior is cooperative.         Thought Content: Thought content normal.          Cognition and Memory: Cognition normal.         Judgment: Judgment normal.     ECOG SCORE             Laboratory:   Latest Reference Range & Units 05/10/22 10:54   WBC 4.5 - 11.5 x10(3)/mcL 6.0   RBC 4.20 - 5.40 x10(6)/mcL 4.08 (L)   Hemoglobin 12.0 - 16.0 gm/dL 12.6   Hematocrit 37.0 - 47.0 % 38.2   MCV 80.0 - 94.0 fL 93.6   MCH 27.0 - 31.0 pg 30.9   MCHC 33.0 - 36.0 mg/dL 33.0   RDW 11.5 - 17.0 % 12.5   Platelets 130 - 400 x10(3)/mcL 255   (L): Data is abnormally low       Assessment:       1) Left breast cancer-diagnosed 2005   --lumpectomy/SLNB, XRT, and 5 years of Tamoxifen   2) Mucinous cystosarcoma-Dx 2017    --SP BSO   3) Bilateral breast cancer   --DCIS-clinical anatomic stage IA / prognostic stage IA (cT1b cN0 M0) left breast Dx 3/16/2022   --Clinical anatomic stage IA / prognostic stage IA (cT1b cN0 M0) Right breast    --%, %, Her 2 neg, Ki 67 58%, Grade 1   --Bilateral mastectomies 4/25/2022, Right SLND      Plan:       Started Femara 3/2022. Prolia started in 10/2022.  Baseline DEXA scan with osteoporosis --right femoral neck bone mineral density with a T score of -2.5. Otherwise osteopenia  She will cont Femara for now and re evaluate next Bone density (due 9/2024)   If bone mass worsen then with change to Tamoxifen.    Continue Femara  Continue Prolia - next due 04/16/2024  Bone density - due 9/2024  Continue Calcium and Vit D supplements and weight bearing exercises  No breast imaging as she has bilateral mastectomies  RTC  in 6 months with labs: CBC, CMP, CA 27-29    Encouraged to call with questions or problems  The patient was given ample opportunity to ask questions and they were all answered to satisfaction; patient demonstrated understanding of what we discussed and is agreeable to the plan.    NEREIDA Robert

## 2024-04-08 ENCOUNTER — LAB VISIT (OUTPATIENT)
Dept: LAB | Facility: HOSPITAL | Age: 69
End: 2024-04-08
Attending: INTERNAL MEDICINE
Payer: MEDICARE

## 2024-04-08 DIAGNOSIS — Z79.899 ENCOUNTER FOR LONG-TERM (CURRENT) USE OF MEDICATIONS: ICD-10-CM

## 2024-04-08 DIAGNOSIS — E78.2 MIXED HYPERLIPIDEMIA: ICD-10-CM

## 2024-04-08 DIAGNOSIS — Z13.6 SCREENING FOR CARDIOVASCULAR, RESPIRATORY, AND GENITOURINARY DISEASES: ICD-10-CM

## 2024-04-08 DIAGNOSIS — E03.9 HYPOTHYROIDISM, UNSPECIFIED TYPE: ICD-10-CM

## 2024-04-08 DIAGNOSIS — Z13.228 SCREENING FOR ENDOCRINE, NUTRITIONAL, METABOLIC AND IMMUNITY DISORDER: ICD-10-CM

## 2024-04-08 DIAGNOSIS — Z13.83 SCREENING FOR CARDIOVASCULAR, RESPIRATORY, AND GENITOURINARY DISEASES: ICD-10-CM

## 2024-04-08 DIAGNOSIS — Z13.29 SCREENING FOR ENDOCRINE, NUTRITIONAL, METABOLIC AND IMMUNITY DISORDER: ICD-10-CM

## 2024-04-08 DIAGNOSIS — I48.91 ATRIAL FIBRILLATION, UNSPECIFIED TYPE: ICD-10-CM

## 2024-04-08 DIAGNOSIS — Z13.21 SCREENING FOR ENDOCRINE, NUTRITIONAL, METABOLIC AND IMMUNITY DISORDER: ICD-10-CM

## 2024-04-08 DIAGNOSIS — Z13.0 SCREENING FOR ENDOCRINE, NUTRITIONAL, METABOLIC AND IMMUNITY DISORDER: ICD-10-CM

## 2024-04-08 DIAGNOSIS — E87.6 HYPOKALEMIA: ICD-10-CM

## 2024-04-08 DIAGNOSIS — I10 PRIMARY HYPERTENSION: ICD-10-CM

## 2024-04-08 DIAGNOSIS — N25.81 SECONDARY HYPERPARATHYROIDISM: ICD-10-CM

## 2024-04-08 DIAGNOSIS — E55.9 VITAMIN D DEFICIENCY: ICD-10-CM

## 2024-04-08 DIAGNOSIS — Z13.89 SCREENING FOR CARDIOVASCULAR, RESPIRATORY, AND GENITOURINARY DISEASES: ICD-10-CM

## 2024-04-08 LAB
T4 FREE SERPL-MCNC: 0.98 NG/DL (ref 0.7–1.48)
TSH SERPL-ACNC: 1.38 UIU/ML (ref 0.35–4.94)

## 2024-04-08 PROCEDURE — 84439 ASSAY OF FREE THYROXINE: CPT

## 2024-04-08 PROCEDURE — 84443 ASSAY THYROID STIM HORMONE: CPT

## 2024-04-08 PROCEDURE — 36415 COLL VENOUS BLD VENIPUNCTURE: CPT

## 2024-04-12 ENCOUNTER — OFFICE VISIT (OUTPATIENT)
Dept: INTERNAL MEDICINE | Facility: CLINIC | Age: 69
End: 2024-04-12
Payer: MEDICARE

## 2024-04-12 VITALS
SYSTOLIC BLOOD PRESSURE: 128 MMHG | BODY MASS INDEX: 37.22 KG/M2 | HEART RATE: 60 BPM | OXYGEN SATURATION: 98 % | RESPIRATION RATE: 16 BRPM | WEIGHT: 218 LBS | HEIGHT: 64 IN | DIASTOLIC BLOOD PRESSURE: 76 MMHG

## 2024-04-12 DIAGNOSIS — I48.0 PAROXYSMAL ATRIAL FIBRILLATION: Chronic | ICD-10-CM

## 2024-04-12 DIAGNOSIS — M81.0 AGE-RELATED OSTEOPOROSIS WITHOUT CURRENT PATHOLOGICAL FRACTURE: Chronic | ICD-10-CM

## 2024-04-12 DIAGNOSIS — E55.9 VITAMIN D INSUFFICIENCY: ICD-10-CM

## 2024-04-12 DIAGNOSIS — E03.9 ACQUIRED HYPOTHYROIDISM: Chronic | ICD-10-CM

## 2024-04-12 DIAGNOSIS — Z17.0 MALIGNANT NEOPLASM OF UPPER-INNER QUADRANT OF RIGHT BREAST IN FEMALE, ESTROGEN RECEPTOR POSITIVE: ICD-10-CM

## 2024-04-12 DIAGNOSIS — I10 PRIMARY HYPERTENSION: Chronic | ICD-10-CM

## 2024-04-12 DIAGNOSIS — C50.211 MALIGNANT NEOPLASM OF UPPER-INNER QUADRANT OF RIGHT BREAST IN FEMALE, ESTROGEN RECEPTOR POSITIVE: ICD-10-CM

## 2024-04-12 DIAGNOSIS — E78.2 MIXED HYPERLIPIDEMIA: Primary | Chronic | ICD-10-CM

## 2024-04-12 DIAGNOSIS — I73.9 INTERMITTENT CLAUDICATION: ICD-10-CM

## 2024-04-12 DIAGNOSIS — E66.01 SEVERE OBESITY (BMI 35.0-39.9) WITH COMORBIDITY: Chronic | ICD-10-CM

## 2024-04-12 DIAGNOSIS — N25.81 SECONDARY HYPERPARATHYROIDISM: ICD-10-CM

## 2024-04-12 PROCEDURE — 1159F MED LIST DOCD IN RCRD: CPT | Mod: CPTII,,, | Performed by: INTERNAL MEDICINE

## 2024-04-12 PROCEDURE — 3074F SYST BP LT 130 MM HG: CPT | Mod: CPTII,,, | Performed by: INTERNAL MEDICINE

## 2024-04-12 PROCEDURE — 3078F DIAST BP <80 MM HG: CPT | Mod: CPTII,,, | Performed by: INTERNAL MEDICINE

## 2024-04-12 PROCEDURE — 3008F BODY MASS INDEX DOCD: CPT | Mod: CPTII,,, | Performed by: INTERNAL MEDICINE

## 2024-04-12 PROCEDURE — 1160F RVW MEDS BY RX/DR IN RCRD: CPT | Mod: CPTII,,, | Performed by: INTERNAL MEDICINE

## 2024-04-12 PROCEDURE — 99214 OFFICE O/P EST MOD 30 MIN: CPT | Mod: ,,, | Performed by: INTERNAL MEDICINE

## 2024-04-12 RX ORDER — ERGOCALCIFEROL 1.25 MG/1
50000 CAPSULE ORAL
Qty: 42 CAPSULE | Refills: 1 | Status: SHIPPED | OUTPATIENT
Start: 2024-04-15 | End: 2025-03-17

## 2024-04-12 NOTE — ASSESSMENT & PLAN NOTE
-patient on atorvastatin 20 mg p.o. daily, continue   -avoid excessive saturated fat intake, emphasized on importance of exercise

## 2024-04-12 NOTE — ASSESSMENT & PLAN NOTE
-overall stable, no acute issues   -okay to use magnesium supplement to see if it would help with her muscle aches

## 2024-04-12 NOTE — PROGRESS NOTES
Subjective:      Patient ID: Riana Pastrana is a 68 y.o. female.    Chief Complaint: Hypertension (4 month f/u thyroid/htn /Labs done )    Ms. Yoo is a 68-year-old female who is here today for her 4 month follow-up visit..  Comorbidities include PAF, HTN, HLD and breast cancer status post bilateral mastectomies in April of 2022.  Followed by Oncology closely.  Overall doing well and has no acute needs or complaints.  Labs are reviewed and noted with impaired glucose regulation with a hemoglobin A1c of 6.5 in December of 2023.  We will monitor again for next visit to make sure she is not becoming a full-blown diabetic.  She is currently watching carbohydrates and sugary food intake.    Advised to take calcium plus vitamin-D twice a day and okay to add magnesium as a supplement.    .    M CW:  12/08/2023  Bilateral mastectomies    The patient's Health Maintenance was reviewed and the following appears to be due at this time:   Health Maintenance Due   Topic Date Due    Hepatitis C Screening  Never done    TETANUS VACCINE  Never done    Shingles Vaccine (1 of 2) Never done    COVID-19 Vaccine (6 - 2023-24 season) 12/20/2023        Past Medical History:  Past Medical History:   Diagnosis Date    History of bilateral breast cancer     left in 2007; bilateral in 2022    Hyperlipidemia 5/13/2022    Hypertension     Hypothyroidism 5/13/2022    Severe obesity (BMI 35.0-39.9) with comorbidity 5/13/2022     Past Surgical History:   Procedure Laterality Date    BREAST BIOPSY      BREAST SURGERY      HYSTERECTOMY      TONSILLECTOMY       Review of patient's allergies indicates:   Allergen Reactions    Penicillins Other (See Comments)     UNKNOWN REACTION. ALLERGY SINCE CHILDHOOD       Social History     Socioeconomic History    Marital status:    Tobacco Use    Smoking status: Never    Smokeless tobacco: Never   Substance and Sexual Activity    Alcohol use: Never    Drug use: Never     Social Determinants of  "Health     Financial Resource Strain: Low Risk  (7/27/2023)    Overall Financial Resource Strain (CARDIA)     Difficulty of Paying Living Expenses: Not hard at all   Food Insecurity: No Food Insecurity (7/27/2023)    Hunger Vital Sign     Worried About Running Out of Food in the Last Year: Never true     Ran Out of Food in the Last Year: Never true   Transportation Needs: No Transportation Needs (7/27/2023)    PRAPARE - Transportation     Lack of Transportation (Medical): No     Lack of Transportation (Non-Medical): No   Physical Activity: Sufficiently Active (7/27/2023)    Exercise Vital Sign     Days of Exercise per Week: 7 days     Minutes of Exercise per Session: 60 min   Stress: No Stress Concern Present (7/27/2023)    Citizen of [place] of Occupational Health - Occupational Stress Questionnaire     Feeling of Stress : Not at all   Social Connections: Socially Integrated (7/27/2023)    Social Connection and Isolation Panel [NHANES]     Frequency of Communication with Friends and Family: More than three times a week     Frequency of Social Gatherings with Friends and Family: More than three times a week     Attends Sabianism Services: More than 4 times per year     Active Member of Clubs or Organizations: Yes     Attends Club or Organization Meetings: More than 4 times per year     Marital Status:    Housing Stability: Unknown (7/27/2023)    Housing Stability Vital Sign     Unable to Pay for Housing in the Last Year: No     Unstable Housing in the Last Year: No     Family History   Problem Relation Age of Onset    Ovarian cancer Sister 50       Review of Systems    A comprehensive review of systems was performed and is negative except for that stated above  Objective:   /76 (BP Location: Right arm, Patient Position: Sitting, BP Method: Medium (Manual))   Pulse 60   Resp 16   Ht 5' 4" (1.626 m)   Wt 98.9 kg (218 lb)   SpO2 98%   BMI 37.42 kg/m²     Physical Exam  Constitutional:       " Appearance: Normal appearance.   HENT:      Head: Normocephalic and atraumatic.      Nose: Nose normal.      Mouth/Throat:      Mouth: Mucous membranes are moist.      Pharynx: Oropharynx is clear.   Eyes:      Extraocular Movements: Extraocular movements intact.      Pupils: Pupils are equal, round, and reactive to light.   Cardiovascular:      Rate and Rhythm: Normal rate and regular rhythm.      Pulses: Normal pulses.   Pulmonary:      Effort: Pulmonary effort is normal.      Breath sounds: Normal breath sounds.   Abdominal:      General: Bowel sounds are normal.      Palpations: Abdomen is soft.   Musculoskeletal:         General: Normal range of motion.      Cervical back: Normal range of motion and neck supple.   Skin:     General: Skin is warm.   Neurological:      General: No focal deficit present.      Mental Status: She is alert and oriented to person, place, and time. Mental status is at baseline.   Psychiatric:         Mood and Affect: Mood normal.       Assessment/ Plan:   1. Mixed hyperlipidemia  Assessment & Plan:  -patient on atorvastatin 20 mg p.o. daily, continue   -avoid excessive saturated fat intake, emphasized on importance of exercise      2. Primary hypertension  Assessment & Plan:  Well controlled.   Continue Metoprolol 25 mg p.o. daily and hydrochlorothiazide 25 mg p.o. daily  Low Sodium Diet (DASH Diet - Less than 2 grams of sodium per day).  Monitor blood pressure daily and log. Report consistent numbers greater than 140/90.  Maintain healthy weight with goal BMI <30. Exercise 30 minutes per day, 5 days per week.      3. Paroxysmal atrial fibrillation  Assessment & Plan:  -rate and rhythm controlled, continue with metoprolol      4. Severe obesity (BMI 35.0-39.9) with comorbidity  Assessment & Plan:  Body mass index is 37.42 kg/m².  Goal BMI <30.  Exercise 5 times a week for 30 minutes per day.  Avoid soda, simple sugars, excessive rice, potatoes or bread. Limit fast foods and fried  foods.  Choose complex carbs in moderation (example: green vegetables, beans, oatmeal). Eat plenty of fresh fruits and vegetables with lean meats daily.  Do not skip meals. Eat a balanced portion size.  Avoid fad diets. Consider permanent healthy life style changes.        5. Age-related osteoporosis without current pathological fracture  Assessment & Plan:  -on treatment with Prolia every 6 months, doing well   -weight-bearing exercises emphasized   -calcium plus vitamin-D on a regular basis      6. Acquired hypothyroidism  Assessment & Plan:  -well controlled, continue with levothyroxine 50 mcg p.o. daily, continue    Orders:  -     TSH; Future; Expected date: 07/12/2024    7. Secondary hyperparathyroidism  -     ergocalciferol (ERGOCALCIFEROL) 50,000 unit Cap; Take 1 capsule (50,000 Units total) by mouth twice a week.  Dispense: 42 capsule; Refill: 1    8. Intermittent claudication  Assessment & Plan:  -overall stable, no acute issues   -okay to use magnesium supplement to see if it would help with her muscle aches      9. Vitamin D insufficiency  -     ergocalciferol (ERGOCALCIFEROL) 50,000 unit Cap; Take 1 capsule (50,000 Units total) by mouth twice a week.  Dispense: 42 capsule; Refill: 1    10. Malignant neoplasm of upper-inner quadrant of right breast in female, estrogen receptor positive  Assessment & Plan:  -stable, followed by Oncology   -status post bilateral mastectomies

## 2024-04-18 ENCOUNTER — INFUSION (OUTPATIENT)
Dept: INFUSION THERAPY | Facility: HOSPITAL | Age: 69
End: 2024-04-18
Attending: INTERNAL MEDICINE
Payer: MEDICARE

## 2024-04-18 VITALS
RESPIRATION RATE: 16 BRPM | BODY MASS INDEX: 37.39 KG/M2 | TEMPERATURE: 98 F | SYSTOLIC BLOOD PRESSURE: 129 MMHG | DIASTOLIC BLOOD PRESSURE: 78 MMHG | WEIGHT: 219 LBS | HEART RATE: 60 BPM | OXYGEN SATURATION: 95 % | HEIGHT: 64 IN

## 2024-04-18 DIAGNOSIS — C50.211 MALIGNANT NEOPLASM OF UPPER-INNER QUADRANT OF RIGHT BREAST IN FEMALE, ESTROGEN RECEPTOR POSITIVE: Primary | ICD-10-CM

## 2024-04-18 DIAGNOSIS — Z79.811 USE OF LETROZOLE (FEMARA): ICD-10-CM

## 2024-04-18 DIAGNOSIS — Z17.0 MALIGNANT NEOPLASM OF UPPER-INNER QUADRANT OF RIGHT BREAST IN FEMALE, ESTROGEN RECEPTOR POSITIVE: Primary | ICD-10-CM

## 2024-04-18 DIAGNOSIS — M81.0 OSTEOPOROSIS, UNSPECIFIED OSTEOPOROSIS TYPE, UNSPECIFIED PATHOLOGICAL FRACTURE PRESENCE: ICD-10-CM

## 2024-04-18 PROCEDURE — 96372 THER/PROPH/DIAG INJ SC/IM: CPT

## 2024-04-18 PROCEDURE — 63600175 PHARM REV CODE 636 W HCPCS: Mod: JZ,JG | Performed by: INTERNAL MEDICINE

## 2024-04-18 RX ADMIN — DENOSUMAB 60 MG: 60 INJECTION SUBCUTANEOUS at 09:04

## 2024-04-24 DIAGNOSIS — E78.2 MIXED HYPERLIPIDEMIA: ICD-10-CM

## 2024-04-24 RX ORDER — ATORVASTATIN CALCIUM 20 MG/1
TABLET, FILM COATED ORAL
Qty: 90 TABLET | Refills: 3 | Status: SHIPPED | OUTPATIENT
Start: 2024-04-24

## 2024-05-13 DIAGNOSIS — C50.211 MALIGNANT NEOPLASM OF UPPER-INNER QUADRANT OF RIGHT BREAST IN FEMALE, ESTROGEN RECEPTOR POSITIVE: ICD-10-CM

## 2024-05-13 DIAGNOSIS — Z17.0 MALIGNANT NEOPLASM OF UPPER-INNER QUADRANT OF RIGHT BREAST IN FEMALE, ESTROGEN RECEPTOR POSITIVE: ICD-10-CM

## 2024-05-13 RX ORDER — LETROZOLE 2.5 MG/1
2.5 TABLET, FILM COATED ORAL DAILY
Qty: 30 TABLET | Refills: 4 | Status: SHIPPED | OUTPATIENT
Start: 2024-05-13 | End: 2024-10-10

## 2024-06-04 ENCOUNTER — HOSPITAL ENCOUNTER (EMERGENCY)
Facility: HOSPITAL | Age: 69
Discharge: HOME | End: 2024-06-04
Attending: INTERNAL MEDICINE
Payer: MEDICARE

## 2024-06-04 VITALS
OXYGEN SATURATION: 95 % | RESPIRATION RATE: 22 BRPM | DIASTOLIC BLOOD PRESSURE: 76 MMHG | SYSTOLIC BLOOD PRESSURE: 138 MMHG | WEIGHT: 210 LBS | BODY MASS INDEX: 35.85 KG/M2 | TEMPERATURE: 98 F | HEART RATE: 84 BPM | HEIGHT: 64 IN

## 2024-06-04 DIAGNOSIS — E87.6 HYPOKALEMIA: ICD-10-CM

## 2024-06-04 DIAGNOSIS — L03.116 LEFT LEG CELLULITIS: Primary | ICD-10-CM

## 2024-06-04 DIAGNOSIS — M79.89 LEFT LEG SWELLING: ICD-10-CM

## 2024-06-04 LAB
ALBUMIN SERPL-MCNC: 3.5 G/DL (ref 3.4–4.8)
ALBUMIN/GLOB SERPL: 0.8 RATIO (ref 1.1–2)
ALP SERPL-CCNC: 77 UNIT/L (ref 40–150)
ALT SERPL-CCNC: 30 UNIT/L (ref 0–55)
ANION GAP SERPL CALC-SCNC: 12 MEQ/L
AST SERPL-CCNC: 33 UNIT/L (ref 5–34)
BASOPHILS # BLD AUTO: 0.06 X10(3)/MCL
BASOPHILS NFR BLD AUTO: 0.5 %
BILIRUB SERPL-MCNC: 0.7 MG/DL
BUN SERPL-MCNC: 18 MG/DL (ref 9.8–20.1)
CALCIUM SERPL-MCNC: 9.3 MG/DL (ref 8.4–10.2)
CHLORIDE SERPL-SCNC: 95 MMOL/L (ref 98–107)
CO2 SERPL-SCNC: 27 MMOL/L (ref 23–31)
CREAT SERPL-MCNC: 0.94 MG/DL (ref 0.55–1.02)
CREAT/UREA NIT SERPL: 19
D DIMER PPP IA.FEU-MCNC: 2.42 UG/ML FEU (ref 0–0.5)
EOSINOPHIL # BLD AUTO: 0 X10(3)/MCL (ref 0–0.9)
EOSINOPHIL NFR BLD AUTO: 0 %
ERYTHROCYTE [DISTWIDTH] IN BLOOD BY AUTOMATED COUNT: 13.3 % (ref 11.5–17)
GFR SERPLBLD CREATININE-BSD FMLA CKD-EPI: >60 ML/MIN/1.73/M2
GLOBULIN SER-MCNC: 4.4 GM/DL (ref 2.4–3.5)
GLUCOSE SERPL-MCNC: 220 MG/DL (ref 82–115)
HCT VFR BLD AUTO: 39.8 % (ref 37–47)
HGB BLD-MCNC: 13.8 G/DL (ref 12–16)
IMM GRANULOCYTES # BLD AUTO: 0.05 X10(3)/MCL (ref 0–0.04)
IMM GRANULOCYTES NFR BLD AUTO: 0.4 %
LYMPHOCYTES # BLD AUTO: 1.38 X10(3)/MCL (ref 0.6–4.6)
LYMPHOCYTES NFR BLD AUTO: 11 %
MCH RBC QN AUTO: 31.1 PG (ref 27–31)
MCHC RBC AUTO-ENTMCNC: 34.7 G/DL (ref 33–36)
MCV RBC AUTO: 89.6 FL (ref 80–94)
MONOCYTES # BLD AUTO: 0.59 X10(3)/MCL (ref 0.1–1.3)
MONOCYTES NFR BLD AUTO: 4.7 %
NEUTROPHILS # BLD AUTO: 10.52 X10(3)/MCL (ref 2.1–9.2)
NEUTROPHILS NFR BLD AUTO: 83.4 %
PLATELET # BLD AUTO: 168 X10(3)/MCL (ref 130–400)
PMV BLD AUTO: 10.6 FL (ref 7.4–10.4)
POTASSIUM SERPL-SCNC: 2.7 MMOL/L (ref 3.5–5.1)
PROT SERPL-MCNC: 7.9 GM/DL (ref 5.8–7.6)
RBC # BLD AUTO: 4.44 X10(6)/MCL (ref 4.2–5.4)
SODIUM SERPL-SCNC: 134 MMOL/L (ref 136–145)
WBC # SPEC AUTO: 12.6 X10(3)/MCL (ref 4.5–11.5)

## 2024-06-04 PROCEDURE — 99285 EMERGENCY DEPT VISIT HI MDM: CPT | Mod: 25

## 2024-06-04 PROCEDURE — 85025 COMPLETE CBC W/AUTO DIFF WBC: CPT | Performed by: NURSE PRACTITIONER

## 2024-06-04 PROCEDURE — 85379 FIBRIN DEGRADATION QUANT: CPT | Performed by: NURSE PRACTITIONER

## 2024-06-04 PROCEDURE — 63600175 PHARM REV CODE 636 W HCPCS: Performed by: NURSE PRACTITIONER

## 2024-06-04 PROCEDURE — 80053 COMPREHEN METABOLIC PANEL: CPT | Performed by: NURSE PRACTITIONER

## 2024-06-04 PROCEDURE — 96372 THER/PROPH/DIAG INJ SC/IM: CPT | Performed by: NURSE PRACTITIONER

## 2024-06-04 PROCEDURE — 25000003 PHARM REV CODE 250: Performed by: NURSE PRACTITIONER

## 2024-06-04 RX ORDER — DOXYCYCLINE 100 MG/1
100 CAPSULE ORAL 2 TIMES DAILY
Qty: 20 CAPSULE | Refills: 0 | Status: SHIPPED | OUTPATIENT
Start: 2024-06-04 | End: 2024-06-14

## 2024-06-04 RX ORDER — CEFTRIAXONE 1 G/1
1 INJECTION, POWDER, FOR SOLUTION INTRAMUSCULAR; INTRAVENOUS
Status: COMPLETED | OUTPATIENT
Start: 2024-06-04 | End: 2024-06-04

## 2024-06-04 RX ORDER — LIDOCAINE HYDROCHLORIDE 10 MG/ML
2 INJECTION, SOLUTION EPIDURAL; INFILTRATION; INTRACAUDAL; PERINEURAL
Status: COMPLETED | OUTPATIENT
Start: 2024-06-04 | End: 2024-06-04

## 2024-06-04 RX ORDER — POTASSIUM CHLORIDE 750 MG/1
10 TABLET, EXTENDED RELEASE ORAL 2 TIMES DAILY
Qty: 12 TABLET | Refills: 0 | Status: SHIPPED | OUTPATIENT
Start: 2024-06-04 | End: 2024-06-10 | Stop reason: SDUPTHER

## 2024-06-04 RX ORDER — TRAMADOL HYDROCHLORIDE 50 MG/1
50 TABLET ORAL EVERY 6 HOURS PRN
Qty: 12 TABLET | Refills: 0 | Status: SHIPPED | OUTPATIENT
Start: 2024-06-04

## 2024-06-04 RX ADMIN — LIDOCAINE HYDROCHLORIDE 20 MG: 10 SOLUTION INTRAVENOUS at 04:06

## 2024-06-04 RX ADMIN — CEFTRIAXONE SODIUM 1 G: 1 INJECTION, POWDER, FOR SOLUTION INTRAMUSCULAR; INTRAVENOUS at 04:06

## 2024-06-04 RX ADMIN — POTASSIUM BICARBONATE 40 MEQ: 782 TABLET, EFFERVESCENT ORAL at 02:06

## 2024-06-04 NOTE — ED PROVIDER NOTES
Encounter Date: 6/4/2024       History     Chief Complaint   Patient presents with    Leg Swelling     C/o left leg swelling and redness started yesterday. Reports her dog scratched her on the same leg the day before     Patient is a 68-year-old female who presents to the emergency department complaints of left leg swelling.  States 2 days ago her dog scratched her and she had no issues but started with the swelling yesterday and worsened today.  Moderate edema erythema noted to the leg.  Also has low-grade temp here.  Does report chills.  Denies any nausea vomiting.  She reports the leg is not painful.  Denies any other complaints or associated symptoms at this time.      Review of patient's allergies indicates:   Allergen Reactions    Penicillins Other (See Comments)     UNKNOWN REACTION. ALLERGY SINCE CHILDHOOD       Past Medical History:   Diagnosis Date    History of bilateral breast cancer     left in 2007; bilateral in 2022    Hyperlipidemia 5/13/2022    Hypertension     Hypothyroidism 5/13/2022    Severe obesity (BMI 35.0-39.9) with comorbidity 5/13/2022     Past Surgical History:   Procedure Laterality Date    BREAST BIOPSY      BREAST SURGERY      HYSTERECTOMY      TONSILLECTOMY       Family History   Problem Relation Name Age of Onset    Ovarian cancer Sister  50     Social History     Tobacco Use    Smoking status: Never    Smokeless tobacco: Never   Substance Use Topics    Alcohol use: Never    Drug use: Never     Review of Systems   Constitutional:  Negative for activity change, appetite change and fever.   HENT:  Negative for congestion, dental problem and sore throat.    Eyes:  Negative for discharge and itching.   Respiratory:  Negative for apnea, chest tightness and shortness of breath.    Cardiovascular:  Negative for chest pain.   Gastrointestinal:  Negative for abdominal distention, abdominal pain and nausea.   Genitourinary:  Negative for dysuria.   Musculoskeletal:  Negative for back pain.    Skin:  Positive for color change, rash and wound.   Neurological:  Negative for dizziness, facial asymmetry and weakness.   Hematological:  Does not bruise/bleed easily.   Psychiatric/Behavioral:  Negative for agitation, behavioral problems and confusion.    All other systems reviewed and are negative.      Physical Exam     Initial Vitals [06/04/24 1337]   BP Pulse Resp Temp SpO2   (!) 144/79 93 18 100.2 °F (37.9 °C) (!) 93 %      MAP       --         Physical Exam    Nursing note and vitals reviewed.  Constitutional: Vital signs are normal. She appears well-developed and well-nourished.  Non-toxic appearance. She does not have a sickly appearance.   HENT:   Head: Normocephalic and atraumatic.   Eyes: Conjunctivae, EOM and lids are normal. Lids are everted and swept, no foreign bodies found.   Neck: Trachea normal and phonation normal. Neck supple. No thyroid mass and no thyromegaly present.   Normal range of motion.   Full passive range of motion without pain.     Cardiovascular:  Normal rate, regular rhythm, S1 normal, S2 normal, normal heart sounds, intact distal pulses and normal pulses.           Musculoskeletal:         General: Edema present. No tenderness.      Cervical back: Full passive range of motion without pain, normal range of motion and neck supple.     Lymphadenopathy:     She has no cervical adenopathy.   Neurological: She is alert and oriented to person, place, and time. She has normal strength.   Skin: Skin is warm, dry and intact. Capillary refill takes less than 2 seconds. There is erythema.   Moderate edema starting to left lower leg around 4 in below the knee.  Moderately erythematous with a edema.  Warmth noted touch.   Psychiatric: She has a normal mood and affect. Her speech is normal and behavior is normal. Judgment normal. Cognition and memory are normal.         ED Course   Procedures  Labs Reviewed   COMPREHENSIVE METABOLIC PANEL - Abnormal; Notable for the following components:        Result Value    Sodium 134 (*)     Potassium 2.7 (*)     Chloride 95 (*)     Glucose 220 (*)     Protein Total 7.9 (*)     Globulin 4.4 (*)     Albumin/Globulin Ratio 0.8 (*)     All other components within normal limits   CBC WITH DIFFERENTIAL - Abnormal; Notable for the following components:    WBC 12.60 (*)     MCH 31.1 (*)     MPV 10.6 (*)     Neut # 10.52 (*)     IG# 0.05 (*)     All other components within normal limits   D DIMER, QUANTITATIVE - Abnormal; Notable for the following components:    D-Dimer 2.42 (*)     All other components within normal limits   CBC W/ AUTO DIFFERENTIAL    Narrative:     The following orders were created for panel order CBC auto differential.  Procedure                               Abnormality         Status                     ---------                               -----------         ------                     CBC with Differential[[id number]]       Abnormal            Final result                 Please view results for these tests on the individual orders.          Imaging Results              US Lower Extremity Veins Left (Final result)  Result time 06/04/24 16:03:49      Final result by Rip Kimbrough MD (06/04/24 16:03:49)                   Impression:      1. No deep venous thrombosis identified to the left lower extremity.      Electronically signed by: Rip Kimbrough  Date:    06/04/2024  Time:    16:03               Narrative:    EXAMINATION:  LEFT LOWER EXTREMITY VENOUS ULTRASOUND:    CLINICAL HISTORY:  Other specified soft tissue disorders,    COMPARISON:  None available    FINDINGS:  There is normal compression and flow noted to the left common femoral, superficial femoral, popliteal, and  posterior tibial veins .  No evidence of deep venous thrombosis is identified.                                       Medications   cefTRIAXone injection 1 g (has no administration in time range)   LIDOcaine (PF) 10 mg/ml (1%) injection 20 mg (has no administration in time  range)   potassium bicarbonate disintegrating tablet 40 mEq (40 mEq Oral Given 6/4/24 5497)     Medical Decision Making  Patient is a 68-year-old female who presents to the emergency department complaints of left leg swelling.  States 2 days ago her dog scratched her and she had no issues but started with the swelling yesterday and worsened today.  Moderate edema erythema noted to the leg.  Also has low-grade temp here.  Does report chills.  Denies any nausea vomiting.  She reports the leg is not painful.  Denies any other complaints or associated symptoms at this time.    Problems Addressed:  Left leg cellulitis: acute illness or injury     Details: Patient had elevated D-dimer but negative DVT study of the leg.  Also has hypokalemia most likely due to her chronic use of potassium.  Will replace additional potassium for the next 7 days.  Discussed follow up PCP as well as for the cellulitis.  Patient is penicillin allergic so we give Rocephin here and discharged home with doxycycline take with a cellulitis.  Strict ED return precautions discussed for any change or worse symptoms.  Patient verbalized understanding plan of care and no other questions or concerns prior to discharge.    Amount and/or Complexity of Data Reviewed  Labs: ordered.    Risk  Prescription drug management.               ED Course as of 06/04/24 1623   Tue Jun 04, 2024   1609 Ultrasound was negative for any DVT.  Patient does have elevated D-dimer so we did discussed follow up PCP for possible repeat ultrasound and next week.  Also discussed hypokalemia.  Given oral potassium here and when sent home with 10 mEq to at her normal regimen.  Discussed follow up PCP for management of her fluid pill or even her potassium.  Patient verbalized understanding of plan of care.  Strict ED return precautions discussed for any change or worsening symptoms.  Patient had no questions or concerns prior to discharge. [SL]      ED Course User Index  [SL] Eleazar  KYLE Casanova                           Clinical Impression:  Final diagnoses:  [M79.89] Left leg swelling  [L03.116] Left leg cellulitis (Primary)  [E87.6] Hypokalemia          ED Disposition Condition    Discharge Stable          ED Prescriptions       Medication Sig Dispense Start Date End Date Auth. Provider    doxycycline (VIBRAMYCIN) 100 MG Cap Take 1 capsule (100 mg total) by mouth 2 (two) times daily. for 10 days 20 capsule 6/4/2024 6/14/2024 Dave Bush FNP    potassium chloride (KLOR-CON) 10 MEQ TbSR Take 1 tablet (10 mEq total) by mouth 2 (two) times daily. for 6 days 12 tablet 6/4/2024 6/10/2024 Dave Bush FNP    traMADoL (ULTRAM) 50 mg tablet Take 1 tablet (50 mg total) by mouth every 6 (six) hours as needed for Pain. 12 tablet 6/4/2024 -- Dave Bush FNP          Follow-up Information       Follow up With Specialties Details Why Contact Info    Chely Cameron MD Internal Medicine Schedule an appointment as soon as possible for a visit in 1 day For ER Follow Up. [address] [facility] [address]  [phone]               Dave Bush FNP  06/04/24 6722

## 2024-06-05 ENCOUNTER — TELEPHONE (OUTPATIENT)
Dept: INTERNAL MEDICINE | Facility: CLINIC | Age: 69
End: 2024-06-05
Payer: MEDICARE

## 2024-06-05 ENCOUNTER — OFFICE VISIT (OUTPATIENT)
Dept: INTERNAL MEDICINE | Facility: CLINIC | Age: 69
End: 2024-06-05
Payer: MEDICARE

## 2024-06-05 VITALS
OXYGEN SATURATION: 96 % | BODY MASS INDEX: 36.7 KG/M2 | SYSTOLIC BLOOD PRESSURE: 118 MMHG | HEART RATE: 77 BPM | HEIGHT: 64 IN | DIASTOLIC BLOOD PRESSURE: 62 MMHG | WEIGHT: 215 LBS

## 2024-06-05 DIAGNOSIS — L03.116 CELLULITIS OF LEFT LOWER EXTREMITY: ICD-10-CM

## 2024-06-05 DIAGNOSIS — E87.6 HYPOKALEMIA: ICD-10-CM

## 2024-06-05 DIAGNOSIS — Z09 HOSPITAL DISCHARGE FOLLOW-UP: Primary | ICD-10-CM

## 2024-06-05 PROCEDURE — 1159F MED LIST DOCD IN RCRD: CPT | Mod: CPTII,,, | Performed by: INTERNAL MEDICINE

## 2024-06-05 PROCEDURE — 1160F RVW MEDS BY RX/DR IN RCRD: CPT | Mod: CPTII,,, | Performed by: INTERNAL MEDICINE

## 2024-06-05 PROCEDURE — 3074F SYST BP LT 130 MM HG: CPT | Mod: CPTII,,, | Performed by: INTERNAL MEDICINE

## 2024-06-05 PROCEDURE — 1101F PT FALLS ASSESS-DOCD LE1/YR: CPT | Mod: CPTII,,, | Performed by: INTERNAL MEDICINE

## 2024-06-05 PROCEDURE — 3288F FALL RISK ASSESSMENT DOCD: CPT | Mod: CPTII,,, | Performed by: INTERNAL MEDICINE

## 2024-06-05 PROCEDURE — 1126F AMNT PAIN NOTED NONE PRSNT: CPT | Mod: CPTII,,, | Performed by: INTERNAL MEDICINE

## 2024-06-05 PROCEDURE — 3008F BODY MASS INDEX DOCD: CPT | Mod: CPTII,,, | Performed by: INTERNAL MEDICINE

## 2024-06-05 PROCEDURE — 99214 OFFICE O/P EST MOD 30 MIN: CPT | Mod: ,,, | Performed by: INTERNAL MEDICINE

## 2024-06-05 PROCEDURE — 3078F DIAST BP <80 MM HG: CPT | Mod: CPTII,,, | Performed by: INTERNAL MEDICINE

## 2024-06-05 RX ORDER — MAGNESIUM 200 MG
1 TABLET ORAL ONCE
COMMUNITY

## 2024-06-05 NOTE — ASSESSMENT & PLAN NOTE
-potassium was noted to be 2.7 yesterday in ED. she was given potassium replacement and sent home with prescription form potassium replacement over the next couple of days   -will plan to repeat CMP in the next 5-7 days.  -encouraged to increase dietary intake of potassium, including bananas and tomatoes

## 2024-06-05 NOTE — PROGRESS NOTES
Subjective:      Patient ID: Riana Pastrana is a 68 y.o. female.    Chief Complaint: Leg Swelling    Ms. Yoo is a 68-year-old female who is here today for a requested visit.  Comorbidities include PAF, HTN, HLD and breast cancer status post bilateral mastectomies in April of 2022.  Followed by Oncology closely.  Patient was seen in ED yesterday (06/04/2024) with complaints of left leg swelling.  She was also noted to have low-grade temp in ED with reports of chills.  She was diagnosed with left leg cellulitis.  She was given Rocephin in ED and discharged home on 10 day course of doxycycline.  Workup also noted low-potassium (2.7); she was given potassium replacement and discharged home with additional potassium to be taken for the next 7 days.  She is here today to follow-up after ED discharge.  She reports swelling is improved compared to yesterday, but seems to think redness is slightly worse today.  Denies pain to left lower extremity.  Currently on day 2 of doxycycline.      MCW:  12/08/2023  Bilateral mastectomies    The patient's Health Maintenance was reviewed and the following appears to be due at this time:   Health Maintenance Due   Topic Date Due    Hepatitis C Screening  Never done    TETANUS VACCINE  Never done    Shingles Vaccine (1 of 2) Never done    COVID-19 Vaccine (6 - 2023-24 season) 12/20/2023        Past Medical History:  Past Medical History:   Diagnosis Date    History of bilateral breast cancer     left in 2007; bilateral in 2022    Hyperlipidemia 5/13/2022    Hypertension     Hypothyroidism 5/13/2022    Severe obesity (BMI 35.0-39.9) with comorbidity 5/13/2022     Past Surgical History:   Procedure Laterality Date    BREAST BIOPSY      BREAST SURGERY      HYSTERECTOMY      TONSILLECTOMY       Review of patient's allergies indicates:   Allergen Reactions    Penicillins Other (See Comments)     UNKNOWN REACTION. ALLERGY SINCE CHILDHOOD       Social History     Socioeconomic History     Marital status:    Tobacco Use    Smoking status: Never    Smokeless tobacco: Never   Substance and Sexual Activity    Alcohol use: Never    Drug use: Never     Social Determinants of Health     Financial Resource Strain: Low Risk  (7/27/2023)    Overall Financial Resource Strain (CARDIA)     Difficulty of Paying Living Expenses: Not hard at all   Food Insecurity: No Food Insecurity (7/27/2023)    Hunger Vital Sign     Worried About Running Out of Food in the Last Year: Never true     Ran Out of Food in the Last Year: Never true   Transportation Needs: No Transportation Needs (7/27/2023)    PRAPARE - Transportation     Lack of Transportation (Medical): No     Lack of Transportation (Non-Medical): No   Physical Activity: Sufficiently Active (7/27/2023)    Exercise Vital Sign     Days of Exercise per Week: 7 days     Minutes of Exercise per Session: 60 min   Stress: No Stress Concern Present (7/27/2023)    [place] of Occupational Health - Occupational Stress Questionnaire     Feeling of Stress : Not at all   Housing Stability: Unknown (7/27/2023)    Housing Stability Vital Sign     Unable to Pay for Housing in the Last Year: No     Unstable Housing in the Last Year: No     Family History   Problem Relation Name Age of Onset    Ovarian cancer Sister  50       Review of Systems   Constitutional:  Negative for appetite change, chills, diaphoresis, fatigue and fever.   HENT:  Negative for ear pain, sinus pain and sore throat.    Eyes:  Negative for pain and visual disturbance.   Respiratory:  Negative for cough, shortness of breath and wheezing.    Cardiovascular:  Positive for leg swelling. Negative for chest pain and palpitations.   Gastrointestinal:  Negative for abdominal pain, blood in stool, diarrhea, nausea and vomiting.   Endocrine: Negative for cold intolerance and heat intolerance.   Genitourinary:  Negative for difficulty urinating, dysuria, frequency and hematuria.   Musculoskeletal:   "Negative for arthralgias, joint swelling and myalgias.   Skin:  Negative for color change and rash.   Allergic/Immunologic: Negative.    Neurological: Negative.  Negative for dizziness, syncope, light-headedness and numbness.   Hematological: Negative.  Does not bruise/bleed easily.   Psychiatric/Behavioral: Negative.  Negative for dysphoric mood and suicidal ideas. The patient is not nervous/anxious.    All other systems reviewed and are negative.      Objective:   /62 (BP Location: Left arm, Patient Position: Sitting, BP Method: Large (Manual))   Pulse 77   Ht 5' 4" (1.626 m)   Wt 97.5 kg (215 lb)   SpO2 96%   BMI 36.90 kg/m²     Physical Exam  Constitutional:       General: She is not in acute distress.     Appearance: Normal appearance. She is obese.   HENT:      Head: Normocephalic and atraumatic.      Mouth/Throat:      Mouth: Mucous membranes are moist.      Pharynx: Oropharynx is clear.   Eyes:      Extraocular Movements: Extraocular movements intact.      Pupils: Pupils are equal, round, and reactive to light.   Cardiovascular:      Rate and Rhythm: Normal rate and regular rhythm.      Pulses: Normal pulses.      Heart sounds: No murmur heard.  Pulmonary:      Effort: Pulmonary effort is normal. No respiratory distress.      Breath sounds: Normal breath sounds.   Abdominal:      General: Bowel sounds are normal.      Palpations: Abdomen is soft.   Musculoskeletal:         General: Normal range of motion.      Cervical back: Normal range of motion and neck supple.      Right lower le+ Edema present.      Left lower le+ Edema present.   Skin:     General: Skin is warm.      Findings: Erythema (Left lower extremity (see picture below)) present.   Neurological:      General: No focal deficit present.      Mental Status: She is alert and oriented to person, place, and time. Mental status is at baseline.   Psychiatric:         Mood and Affect: Mood normal.         Behavior: Behavior normal. "       Left leg cellulitis      Assessment/ Plan:   1. Hospital discharge follow-up  Assessment & Plan:  -patient was seen in ED on 06/04/2024 and diagnosed with cellulitis of left lower extremity.  -she was discharged home on the same day, patient was not admitted to the hospital as an inpatient status      2. Cellulitis of left lower extremity  Assessment & Plan:  -currently on day 2 of a 10 day course of doxycycline  -continue antibiotics as prescribed   -instructed to clean area with antibacterial soap   -encouraged to elevate legs when able  -instructed patient to notify clinic if cellulitis/redness/swelling worsens or persists after completion of antibiotics        3. Hypokalemia  Assessment & Plan:  -potassium was noted to be 2.7 yesterday in ED. she was given potassium replacement and sent home with prescription form potassium replacement over the next couple of days   -will plan to repeat CMP in the next 5-7 days.  -encouraged to increase dietary intake of potassium, including bananas and tomatoes    Orders:  -     Comprehensive Metabolic Panel; Future; Expected date: 06/10/2024

## 2024-06-05 NOTE — TELEPHONE ENCOUNTER
----- Message from [facility] sent at 6/5/2024  8:25 AM CDT -----  .Type:  Needs Medical Advice    Who Called:  pt    Symptoms (please be specific):  left leg swollen and red     How long has patient had these symptoms:   2 days    Pharmacy name and phone #:   [facility] in [place]    Would the patient rather a call back or a response via [facility]?      Best Call Back Number:  [phone]    Additional Information:  pt went to the ER on 6-4-24 for her symptoms we were able to set an appt for her on 6-7-24 she wants to be seen sooner or speak to a nurse if possible thanks

## 2024-06-05 NOTE — ASSESSMENT & PLAN NOTE
-currently on day 2 of a 10 day course of doxycycline  -continue antibiotics as prescribed   -instructed to clean area with antibacterial soap   -encouraged to elevate legs when able  -instructed patient to notify clinic if cellulitis/redness/swelling worsens or persists after completion of antibiotics

## 2024-06-05 NOTE — ASSESSMENT & PLAN NOTE
-patient was seen in ED on 06/04/2024 and diagnosed with cellulitis of left lower extremity.  -she was discharged home on the same day, patient was not admitted to the hospital as an inpatient status

## 2024-06-10 ENCOUNTER — TELEPHONE (OUTPATIENT)
Dept: INTERNAL MEDICINE | Facility: CLINIC | Age: 69
End: 2024-06-10
Payer: MEDICARE

## 2024-06-10 DIAGNOSIS — E87.6 HYPOKALEMIA: Primary | ICD-10-CM

## 2024-06-10 RX ORDER — POTASSIUM CHLORIDE 750 MG/1
10 TABLET, EXTENDED RELEASE ORAL DAILY
Qty: 30 TABLET | Refills: 3 | Status: SHIPPED | OUTPATIENT
Start: 2024-06-10

## 2024-06-10 NOTE — TELEPHONE ENCOUNTER
----- Message from Kiarra Davenport sent at 6/10/2024 11:20 AM CDT -----  Regarding: advice  Who Called: Riana B Melancon    Caller is requesting assistance/information from provider's office.    Symptoms (please be specific):      How long has patient had these symptoms:    Rx name: potassium chloride (KLOR-CON) 10 MEQ TbSR 12 tablet 0 6/4/2024 6/10/2024 No  Sig - Route: Take 1 tablet (10 mEq total) by mouth 2 (two) times daily. for 6 days    [facility] [address] [place] [address] - [address]   Phone: [phone]  Fax: [phone]            Preferred Method of Contact: Phone Call  Patient's Preferred Phone Number on File: [phone]   Best Call Back Number, if different:  Additional Information: Pt would like to know if she should start her regular potassium medication now that she is completely out of the prescription of Klo-Con that the ER doctor had sent into the pharmacy or will a refill be sent in for the same medication; please advise. She did state that the redness and swelling has decreased.

## 2024-06-18 ENCOUNTER — TELEPHONE (OUTPATIENT)
Dept: INTERNAL MEDICINE | Facility: CLINIC | Age: 69
End: 2024-06-18
Payer: MEDICARE

## 2024-06-18 ENCOUNTER — LAB VISIT (OUTPATIENT)
Dept: LAB | Facility: HOSPITAL | Age: 69
End: 2024-06-18
Attending: INTERNAL MEDICINE
Payer: MEDICARE

## 2024-06-18 DIAGNOSIS — E87.6 HYPOKALEMIA: ICD-10-CM

## 2024-06-18 LAB
ALBUMIN SERPL-MCNC: 3.7 G/DL (ref 3.4–4.8)
ALBUMIN/GLOB SERPL: 0.8 RATIO (ref 1.1–2)
ALP SERPL-CCNC: 113 UNIT/L (ref 40–150)
ALT SERPL-CCNC: 36 UNIT/L (ref 0–55)
ANION GAP SERPL CALC-SCNC: 9 MEQ/L
AST SERPL-CCNC: 57 UNIT/L (ref 5–34)
BILIRUB SERPL-MCNC: 0.7 MG/DL
BUN SERPL-MCNC: 12.2 MG/DL (ref 9.8–20.1)
CALCIUM SERPL-MCNC: 9.6 MG/DL (ref 8.4–10.2)
CHLORIDE SERPL-SCNC: 99 MMOL/L (ref 98–107)
CO2 SERPL-SCNC: 32 MMOL/L (ref 23–31)
CREAT SERPL-MCNC: 0.85 MG/DL (ref 0.55–1.02)
CREAT/UREA NIT SERPL: 14
GFR SERPLBLD CREATININE-BSD FMLA CKD-EPI: >60 ML/MIN/1.73/M2
GLOBULIN SER-MCNC: 4.4 GM/DL (ref 2.4–3.5)
GLUCOSE SERPL-MCNC: 113 MG/DL (ref 82–115)
POTASSIUM SERPL-SCNC: 4.2 MMOL/L (ref 3.5–5.1)
PROT SERPL-MCNC: 8.1 GM/DL (ref 5.8–7.6)
SODIUM SERPL-SCNC: 140 MMOL/L (ref 136–145)

## 2024-06-18 PROCEDURE — 36415 COLL VENOUS BLD VENIPUNCTURE: CPT

## 2024-06-18 PROCEDURE — 80053 COMPREHEN METABOLIC PANEL: CPT

## 2024-06-18 NOTE — TELEPHONE ENCOUNTER
----- Message from Chely Cameron MD sent at 6/18/2024  3:23 PM CDT -----  Please contact the patient and let them know that their results were fine and do not require any change in treatment.

## 2024-06-19 NOTE — TELEPHONE ENCOUNTER
----- Message from Paris Jacinto sent at 6/19/2024  9:12 AM CDT -----  Regarding: Patient Call  .Who Called: Riana Pastrana    Patient is returning phone call    Who Left Message for Patient:Margarita  Does the patient know what this is regarding?:return call       Preferred Method of Contact: Phone Call  Patient's Preferred Phone Number on File: [phone]   Best Call Back Number, if different:  Additional Information: missed call

## 2024-07-21 DIAGNOSIS — R60.9 EDEMA, UNSPECIFIED TYPE: ICD-10-CM

## 2024-07-22 RX ORDER — FUROSEMIDE 20 MG/1
20 TABLET ORAL DAILY
Qty: 90 TABLET | Refills: 1 | Status: SHIPPED | OUTPATIENT
Start: 2024-07-22

## 2024-08-14 DIAGNOSIS — E03.9 ACQUIRED HYPOTHYROIDISM: ICD-10-CM

## 2024-08-14 DIAGNOSIS — I10 PRIMARY HYPERTENSION: Primary | ICD-10-CM

## 2024-08-16 ENCOUNTER — TELEPHONE (OUTPATIENT)
Dept: INTERNAL MEDICINE | Facility: CLINIC | Age: 69
End: 2024-08-16
Payer: MEDICARE

## 2024-08-22 NOTE — PROGRESS NOTES
Subjective:      Patient ID: Riana Pastrana is a 68 y.o. female.    Chief Complaint: Follow-up (4 month HTN/thyroid/)    Ms. Yoo is a 68-year-old female who is here today for a follow-up for hypothyroidism.  Comorbidities include PAF, HTN, HLD and breast cancer status post bilateral mastectomies in April of 2022.  Followed by Oncology closely.   Labs are ordered, patient was unable to get them done will do it today and will follow-up on results.  No other acute needs or complaints.  Blood pressure is well controlled      MCW:  12/08/2023  Bilateral mastectomies    The patient's Health Maintenance was reviewed and the following appears to be due at this time:   Health Maintenance Due   Topic Date Due    Hepatitis C Screening  Never done    TETANUS VACCINE  Never done    Shingles Vaccine (1 of 2) Never done    COVID-19 Vaccine (6 - 2023-24 season) 12/20/2023        Past Medical History:  Past Medical History:   Diagnosis Date    History of bilateral breast cancer     left in 2007; bilateral in 2022    Hyperlipidemia 5/13/2022    Hypertension     Hypothyroidism 5/13/2022    Severe obesity (BMI 35.0-39.9) with comorbidity 5/13/2022     Past Surgical History:   Procedure Laterality Date    BREAST BIOPSY      BREAST SURGERY      HYSTERECTOMY      TONSILLECTOMY       Review of patient's allergies indicates:   Allergen Reactions    Penicillins Other (See Comments)     UNKNOWN REACTION. ALLERGY SINCE CHILDHOOD       Social History     Socioeconomic History    Marital status:    Tobacco Use    Smoking status: Never    Smokeless tobacco: Never   Substance and Sexual Activity    Alcohol use: Never    Drug use: Never     Social Determinants of Health     Financial Resource Strain: Low Risk  (7/27/2023)    Overall Financial Resource Strain (CARDIA)     Difficulty of Paying Living Expenses: Not hard at all   Food Insecurity: No Food Insecurity (7/27/2023)    Hunger Vital Sign     Worried About Running Out of Food  "in the Last Year: Never true     Ran Out of Food in the Last Year: Never true   Transportation Needs: No Transportation Needs (7/27/2023)    PRAPARE - Transportation     Lack of Transportation (Medical): No     Lack of Transportation (Non-Medical): No   Physical Activity: Sufficiently Active (7/27/2023)    Exercise Vital Sign     Days of Exercise per Week: 7 days     Minutes of Exercise per Session: 60 min   Stress: No Stress Concern Present (7/27/2023)    [employer] - Occupational Stress Questionnaire     Feeling of Stress : Not at all   Housing Stability: Unknown (7/27/2023)    Housing Stability Vital Sign     Unable to Pay for Housing in the Last Year: No     Unstable Housing in the Last Year: No     Family History   Problem Relation Name Age of Onset    Ovarian cancer Sister  50       Review of Systems    A comprehensive review of systems was performed and is negative except for that stated above  Objective:   /70 (BP Location: Right arm, Patient Position: Sitting, BP Method: Large (Manual))   Pulse 73   Ht 5' 4" (1.626 m)   Wt 96.2 kg (212 lb)   SpO2 97%   BMI 36.39 kg/m²     Physical Exam  Constitutional:       Appearance: Normal appearance. She is obese.   HENT:      Head: Normocephalic and atraumatic.      Nose: Nose normal.      Mouth/Throat:      Mouth: Mucous membranes are moist.      Pharynx: Oropharynx is clear.   Eyes:      Extraocular Movements: Extraocular movements intact.      Pupils: Pupils are equal, round, and reactive to light.   Cardiovascular:      Rate and Rhythm: Normal rate and regular rhythm.      Pulses: Normal pulses.   Pulmonary:      Effort: Pulmonary effort is normal.      Breath sounds: Normal breath sounds.   Abdominal:      General: Bowel sounds are normal.      Palpations: Abdomen is soft.   Musculoskeletal:         General: Normal range of motion.      Cervical back: Normal range of motion and neck supple.   Skin:     General: Skin is " warm.   Neurological:      General: No focal deficit present.      Mental Status: She is alert and oriented to person, place, and time. Mental status is at baseline.   Psychiatric:         Mood and Affect: Mood normal.       Assessment/ Plan:   1. Primary hypertension  Assessment & Plan:  Well controlled.   Continue Metoprolol 25 mg p.o. daily and hydrochlorothiazide 25 mg p.o. daily  -also on Lasix that does affect her blood pressure  Low Sodium Diet (DASH Diet - Less than 2 grams of sodium per day).  Monitor blood pressure daily and log. Report consistent numbers greater than 140/90.  Maintain healthy weight with goal BMI <30. Exercise 30 minutes per day, 5 days per week.         2. Mixed hyperlipidemia  Assessment & Plan:  -patient on atorvastatin 20 mg p.o. daily, continue   -avoid excessive saturated fat intake, emphasized on importance of exercise         3. Paroxysmal atrial fibrillation  Assessment & Plan:  -on metoprolol, rate and rhythm controlled, continue      4. Acquired hypothyroidism  Assessment & Plan:  -follow-up on labs, patient to get them completed today   -tailor dose of levothyroxine is appropriate      5. Severe obesity (BMI 35.0-39.9) with comorbidity  Assessment & Plan:  Body mass index is 36.39 kg/m².  A little improved from 37.4 at last visit  Goal BMI <30.  Exercise 5 times a week for 30 minutes per day.  Avoid soda, simple sugars, excessive rice, potatoes or bread. Limit fast foods and fried foods.  Choose complex carbs in moderation (example: green vegetables, beans, oatmeal). Eat plenty of fresh fruits and vegetables with lean meats daily.  Do not skip meals. Eat a balanced portion size.  Avoid fad diets. Consider permanent healthy life style changes.

## 2024-08-23 ENCOUNTER — OFFICE VISIT (OUTPATIENT)
Dept: INTERNAL MEDICINE | Facility: CLINIC | Age: 69
End: 2024-08-23
Payer: MEDICARE

## 2024-08-23 ENCOUNTER — LAB VISIT (OUTPATIENT)
Dept: LAB | Facility: HOSPITAL | Age: 69
End: 2024-08-23
Attending: INTERNAL MEDICINE
Payer: MEDICARE

## 2024-08-23 VITALS
OXYGEN SATURATION: 97 % | WEIGHT: 212 LBS | HEIGHT: 64 IN | DIASTOLIC BLOOD PRESSURE: 70 MMHG | SYSTOLIC BLOOD PRESSURE: 124 MMHG | HEART RATE: 73 BPM | BODY MASS INDEX: 36.19 KG/M2

## 2024-08-23 DIAGNOSIS — I48.0 PAROXYSMAL ATRIAL FIBRILLATION: Chronic | ICD-10-CM

## 2024-08-23 DIAGNOSIS — E78.2 MIXED HYPERLIPIDEMIA: Chronic | ICD-10-CM

## 2024-08-23 DIAGNOSIS — I10 PRIMARY HYPERTENSION: Primary | Chronic | ICD-10-CM

## 2024-08-23 DIAGNOSIS — E03.9 ACQUIRED HYPOTHYROIDISM: ICD-10-CM

## 2024-08-23 DIAGNOSIS — I10 PRIMARY HYPERTENSION: ICD-10-CM

## 2024-08-23 DIAGNOSIS — E66.01 SEVERE OBESITY (BMI 35.0-39.9) WITH COMORBIDITY: Chronic | ICD-10-CM

## 2024-08-23 DIAGNOSIS — E03.9 ACQUIRED HYPOTHYROIDISM: Chronic | ICD-10-CM

## 2024-08-23 LAB
ALBUMIN SERPL-MCNC: 3.9 G/DL (ref 3.4–4.8)
ALBUMIN/GLOB SERPL: 1.1 RATIO (ref 1.1–2)
ALP SERPL-CCNC: 115 UNIT/L (ref 40–150)
ALT SERPL-CCNC: 39 UNIT/L (ref 0–55)
ANION GAP SERPL CALC-SCNC: 14 MEQ/L
AST SERPL-CCNC: 61 UNIT/L (ref 5–34)
BASOPHILS # BLD AUTO: 0.08 X10(3)/MCL
BASOPHILS NFR BLD AUTO: 0.9 %
BILIRUB SERPL-MCNC: 0.5 MG/DL
BUN SERPL-MCNC: 10.4 MG/DL (ref 9.8–20.1)
CALCIUM SERPL-MCNC: 9.9 MG/DL (ref 8.4–10.2)
CHLORIDE SERPL-SCNC: 100 MMOL/L (ref 98–107)
CO2 SERPL-SCNC: 25 MMOL/L (ref 23–31)
CREAT SERPL-MCNC: 0.8 MG/DL (ref 0.55–1.02)
CREAT/UREA NIT SERPL: 13
EOSINOPHIL # BLD AUTO: 0.11 X10(3)/MCL (ref 0–0.9)
EOSINOPHIL NFR BLD AUTO: 1.3 %
ERYTHROCYTE [DISTWIDTH] IN BLOOD BY AUTOMATED COUNT: 13.2 % (ref 11.5–17)
GFR SERPLBLD CREATININE-BSD FMLA CKD-EPI: >60 ML/MIN/1.73/M2
GLOBULIN SER-MCNC: 3.7 GM/DL (ref 2.4–3.5)
GLUCOSE SERPL-MCNC: 149 MG/DL (ref 82–115)
HCT VFR BLD AUTO: 42.1 % (ref 37–47)
HGB BLD-MCNC: 14.1 G/DL (ref 12–16)
IMM GRANULOCYTES # BLD AUTO: 0.06 X10(3)/MCL (ref 0–0.04)
IMM GRANULOCYTES NFR BLD AUTO: 0.7 %
LYMPHOCYTES # BLD AUTO: 2.26 X10(3)/MCL (ref 0.6–4.6)
LYMPHOCYTES NFR BLD AUTO: 26.8 %
MCH RBC QN AUTO: 31.3 PG (ref 27–31)
MCHC RBC AUTO-ENTMCNC: 33.5 G/DL (ref 33–36)
MCV RBC AUTO: 93.3 FL (ref 80–94)
MONOCYTES # BLD AUTO: 0.72 X10(3)/MCL (ref 0.1–1.3)
MONOCYTES NFR BLD AUTO: 8.5 %
NEUTROPHILS # BLD AUTO: 5.2 X10(3)/MCL (ref 2.1–9.2)
NEUTROPHILS NFR BLD AUTO: 61.8 %
NRBC BLD AUTO-RTO: 0 %
PLATELET # BLD AUTO: 202 X10(3)/MCL (ref 130–400)
PMV BLD AUTO: 10.8 FL (ref 7.4–10.4)
POTASSIUM SERPL-SCNC: 3.3 MMOL/L (ref 3.5–5.1)
PROT SERPL-MCNC: 7.6 GM/DL (ref 5.8–7.6)
RBC # BLD AUTO: 4.51 X10(6)/MCL (ref 4.2–5.4)
SODIUM SERPL-SCNC: 139 MMOL/L (ref 136–145)
TSH SERPL-ACNC: 1.54 UIU/ML (ref 0.35–4.94)
WBC # BLD AUTO: 8.43 X10(3)/MCL (ref 4.5–11.5)

## 2024-08-23 PROCEDURE — 84443 ASSAY THYROID STIM HORMONE: CPT

## 2024-08-23 PROCEDURE — 80053 COMPREHEN METABOLIC PANEL: CPT

## 2024-08-23 PROCEDURE — 85025 COMPLETE CBC W/AUTO DIFF WBC: CPT

## 2024-08-23 PROCEDURE — 36415 COLL VENOUS BLD VENIPUNCTURE: CPT

## 2024-08-23 NOTE — ASSESSMENT & PLAN NOTE
Well controlled.   Continue Metoprolol 25 mg p.o. daily and hydrochlorothiazide 25 mg p.o. daily  -also on Lasix that does affect her blood pressure  Low Sodium Diet (DASH Diet - Less than 2 grams of sodium per day).  Monitor blood pressure daily and log. Report consistent numbers greater than 140/90.  Maintain healthy weight with goal BMI <30. Exercise 30 minutes per day, 5 days per week.

## 2024-08-23 NOTE — ASSESSMENT & PLAN NOTE
Body mass index is 36.39 kg/m².  A little improved from 37.4 at last visit  Goal BMI <30.  Exercise 5 times a week for 30 minutes per day.  Avoid soda, simple sugars, excessive rice, potatoes or bread. Limit fast foods and fried foods.  Choose complex carbs in moderation (example: green vegetables, beans, oatmeal). Eat plenty of fresh fruits and vegetables with lean meats daily.  Do not skip meals. Eat a balanced portion size.  Avoid fad diets. Consider permanent healthy life style changes.

## 2024-08-23 NOTE — ASSESSMENT & PLAN NOTE
-follow-up on labs, patient to get them completed today   -tailor dose of levothyroxine is appropriate

## 2024-08-26 ENCOUNTER — TELEPHONE (OUTPATIENT)
Dept: INTERNAL MEDICINE | Facility: CLINIC | Age: 69
End: 2024-08-26
Payer: MEDICARE

## 2024-08-26 NOTE — TELEPHONE ENCOUNTER
----- Message from KYLE Waldrop sent at 8/26/2024  3:45 PM CDT -----    Please notify patient most recent blood work stable, no change in thyroid medication dose.  However noted to have low potassium level.  Please clarify if she is currently taking her potassium prescription?  Also confirm if she is taking Lasix daily?  ----- Message -----  From: Lab, Background User  Sent: 8/23/2024   9:56 AM CDT  To: Chely Cameron MD

## 2024-08-28 DIAGNOSIS — E87.6 HYPOKALEMIA: Primary | ICD-10-CM

## 2024-08-28 RX ORDER — POTASSIUM CHLORIDE 750 MG/1
10 CAPSULE, EXTENDED RELEASE ORAL
Qty: 90 CAPSULE | Refills: 3 | Status: SHIPPED | OUTPATIENT
Start: 2024-08-28

## 2024-08-29 DIAGNOSIS — E87.6 HYPOKALEMIA: Primary | ICD-10-CM

## 2024-08-29 NOTE — TELEPHONE ENCOUNTER
Please inform patient repeat potassium level placed for her to obtain nearest convenience.  If again low we will need to increase her prescription for potassium medication.

## 2024-09-03 ENCOUNTER — LAB VISIT (OUTPATIENT)
Dept: LAB | Facility: HOSPITAL | Age: 69
End: 2024-09-03
Payer: MEDICARE

## 2024-09-03 DIAGNOSIS — E87.6 HYPOKALEMIA: ICD-10-CM

## 2024-09-03 LAB — POTASSIUM SERPL-SCNC: 3.2 MMOL/L (ref 3.5–5.1)

## 2024-09-03 PROCEDURE — 36415 COLL VENOUS BLD VENIPUNCTURE: CPT

## 2024-09-03 PROCEDURE — 84132 ASSAY OF SERUM POTASSIUM: CPT

## 2024-09-04 ENCOUNTER — TELEPHONE (OUTPATIENT)
Dept: INTERNAL MEDICINE | Facility: CLINIC | Age: 69
End: 2024-09-04
Payer: MEDICARE

## 2024-09-04 DIAGNOSIS — E87.6 HYPOKALEMIA: ICD-10-CM

## 2024-09-04 RX ORDER — POTASSIUM CHLORIDE 750 MG/1
20 CAPSULE, EXTENDED RELEASE ORAL DAILY
Qty: 60 CAPSULE | Refills: 0 | Status: SHIPPED | OUTPATIENT
Start: 2024-09-04 | End: 2024-10-04

## 2024-09-04 NOTE — TELEPHONE ENCOUNTER
Spoke with patient and she was given results of labs and recommendations. Verbalized understanding.

## 2024-09-04 NOTE — PROGRESS NOTES
Please inform patient repeat potassium again noted low on potassium 10 mEq daily.  This is likely related to her current use of daily furosemide (Lasix).  We will increase potassium supplementation from 10 mEq daily to 20 mEq daily with repeat potassium within a week.  Prescription and lab order placed.

## 2024-09-04 NOTE — TELEPHONE ENCOUNTER
----- Message from KYLE Waldrop sent at 9/4/2024  7:53 AM CDT -----  Please inform patient repeat potassium again noted low on potassium 10 mEq daily.  This is likely related to her current use of daily furosemide (Lasix).  We will increase potassium supplementation from 10 mEq daily to 20 mEq daily with repeat potassium within a week.  Prescription and lab order placed.

## 2024-09-09 PROBLEM — Z09 HOSPITAL DISCHARGE FOLLOW-UP: Status: RESOLVED | Noted: 2024-06-05 | Resolved: 2024-09-09

## 2024-09-23 ENCOUNTER — HOSPITAL ENCOUNTER (OUTPATIENT)
Dept: RADIOLOGY | Facility: HOSPITAL | Age: 69
Discharge: HOME | End: 2024-09-23
Attending: NURSE PRACTITIONER
Payer: MEDICARE

## 2024-09-23 DIAGNOSIS — Z79.811 USE OF LETROZOLE (FEMARA): ICD-10-CM

## 2024-09-23 PROCEDURE — 77080 DXA BONE DENSITY AXIAL: CPT | Mod: TC

## 2024-10-03 ENCOUNTER — OFFICE VISIT (OUTPATIENT)
Dept: HEMATOLOGY/ONCOLOGY | Facility: CLINIC | Age: 69
End: 2024-10-03
Payer: MEDICARE

## 2024-10-03 ENCOUNTER — LAB VISIT (OUTPATIENT)
Dept: LAB | Facility: HOSPITAL | Age: 69
End: 2024-10-03
Attending: NURSE PRACTITIONER
Payer: MEDICARE

## 2024-10-03 VITALS
TEMPERATURE: 98 F | WEIGHT: 212.31 LBS | HEART RATE: 62 BPM | DIASTOLIC BLOOD PRESSURE: 77 MMHG | RESPIRATION RATE: 18 BRPM | BODY MASS INDEX: 36.25 KG/M2 | OXYGEN SATURATION: 96 % | HEIGHT: 64 IN | SYSTOLIC BLOOD PRESSURE: 123 MMHG

## 2024-10-03 DIAGNOSIS — Z51.81 THERAPEUTIC DRUG MONITORING: ICD-10-CM

## 2024-10-03 DIAGNOSIS — Z79.811 USE OF LETROZOLE (FEMARA): ICD-10-CM

## 2024-10-03 DIAGNOSIS — C50.211 MALIGNANT NEOPLASM OF UPPER-INNER QUADRANT OF RIGHT BREAST IN FEMALE, ESTROGEN RECEPTOR POSITIVE: ICD-10-CM

## 2024-10-03 DIAGNOSIS — Z17.0 MALIGNANT NEOPLASM OF UPPER-INNER QUADRANT OF RIGHT BREAST IN FEMALE, ESTROGEN RECEPTOR POSITIVE: ICD-10-CM

## 2024-10-03 DIAGNOSIS — C50.211 MALIGNANT NEOPLASM OF UPPER-INNER QUADRANT OF RIGHT BREAST IN FEMALE, ESTROGEN RECEPTOR POSITIVE: Primary | ICD-10-CM

## 2024-10-03 DIAGNOSIS — Z17.0 MALIGNANT NEOPLASM OF UPPER-INNER QUADRANT OF RIGHT BREAST IN FEMALE, ESTROGEN RECEPTOR POSITIVE: Primary | ICD-10-CM

## 2024-10-03 LAB
ALBUMIN SERPL-MCNC: 4.3 G/DL (ref 3.4–4.8)
ALBUMIN/GLOB SERPL: 1.2 RATIO (ref 1.1–2)
ALP SERPL-CCNC: 107 UNIT/L (ref 40–150)
ALT SERPL-CCNC: 61 UNIT/L (ref 0–55)
ANION GAP SERPL CALC-SCNC: 9 MEQ/L
AST SERPL-CCNC: 92 UNIT/L (ref 5–34)
BASOPHILS # BLD AUTO: 0.09 X10(3)/MCL
BASOPHILS NFR BLD AUTO: 1.2 %
BILIRUB SERPL-MCNC: 0.7 MG/DL
BUN SERPL-MCNC: 14.3 MG/DL (ref 9.8–20.1)
CALCIUM SERPL-MCNC: 10.8 MG/DL (ref 8.4–10.2)
CHLORIDE SERPL-SCNC: 101 MMOL/L (ref 98–107)
CO2 SERPL-SCNC: 33 MMOL/L (ref 23–31)
CREAT SERPL-MCNC: 0.85 MG/DL (ref 0.55–1.02)
CREAT/UREA NIT SERPL: 17
EOSINOPHIL # BLD AUTO: 0.09 X10(3)/MCL (ref 0–0.9)
EOSINOPHIL NFR BLD AUTO: 1.2 %
ERYTHROCYTE [DISTWIDTH] IN BLOOD BY AUTOMATED COUNT: 12.7 % (ref 11.5–17)
GFR SERPLBLD CREATININE-BSD FMLA CKD-EPI: >60 ML/MIN/1.73/M2
GLOBULIN SER-MCNC: 3.7 GM/DL (ref 2.4–3.5)
GLUCOSE SERPL-MCNC: 104 MG/DL (ref 82–115)
HCT VFR BLD AUTO: 44.4 % (ref 37–47)
HGB BLD-MCNC: 15 G/DL (ref 12–16)
IMM GRANULOCYTES # BLD AUTO: 0.01 X10(3)/MCL (ref 0–0.04)
IMM GRANULOCYTES NFR BLD AUTO: 0.1 %
LYMPHOCYTES # BLD AUTO: 2.44 X10(3)/MCL (ref 0.6–4.6)
LYMPHOCYTES NFR BLD AUTO: 32.2 %
MCH RBC QN AUTO: 31.3 PG (ref 27–31)
MCHC RBC AUTO-ENTMCNC: 33.8 G/DL (ref 33–36)
MCV RBC AUTO: 92.7 FL (ref 80–94)
MONOCYTES # BLD AUTO: 0.62 X10(3)/MCL (ref 0.1–1.3)
MONOCYTES NFR BLD AUTO: 8.2 %
NEUTROPHILS # BLD AUTO: 4.33 X10(3)/MCL (ref 2.1–9.2)
NEUTROPHILS NFR BLD AUTO: 57.1 %
PLATELET # BLD AUTO: 231 X10(3)/MCL (ref 130–400)
PMV BLD AUTO: 10.6 FL (ref 7.4–10.4)
POTASSIUM SERPL-SCNC: 4.1 MMOL/L (ref 3.5–5.1)
PROT SERPL-MCNC: 8 GM/DL (ref 5.8–7.6)
RBC # BLD AUTO: 4.79 X10(6)/MCL (ref 4.2–5.4)
SODIUM SERPL-SCNC: 143 MMOL/L (ref 136–145)
WBC # BLD AUTO: 7.58 X10(3)/MCL (ref 4.5–11.5)

## 2024-10-03 PROCEDURE — 36415 COLL VENOUS BLD VENIPUNCTURE: CPT

## 2024-10-03 PROCEDURE — 85025 COMPLETE CBC W/AUTO DIFF WBC: CPT

## 2024-10-03 PROCEDURE — 86300 IMMUNOASSAY TUMOR CA 15-3: CPT

## 2024-10-03 PROCEDURE — 80053 COMPREHEN METABOLIC PANEL: CPT

## 2024-10-03 PROCEDURE — 99999 PR PBB SHADOW E&M-EST. PATIENT-LVL IV: CPT | Mod: PBBFAC,,, | Performed by: NURSE PRACTITIONER

## 2024-10-03 NOTE — PROGRESS NOTES
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 2022 BL SC MG at [facility]-which revealed on R MG lower inner quadrant anterior depth, there are two focal asymmetries. On L MG central region posterior depth there are indeterminate calcifications. At 12 o'clock left breast middle depth, there is a focal asymmetry with calcifications. There are stable post-therapy changes of the left breast. No detrimental change at the lumpectomy bed. There are stable post-core needle biopsy changes and a jesús shaped clip in the right breast. No detrimental change at this biopsy site. No other significant masses, calcifications, or other findings are seen in either breast. BIRADS-0 ; additional imaging needed.    2. 2022 BL DG MG/ BL US BREAST LIMITED at [facility]- which revealed on R MG at 4 o'clock subareolar anterior there is a 0.7 cm oval, circumscribed mass. On L MG 12 o'clock, anterior depth, there is a 1.2 cm focal asymmetry with associated heterogeneous calcifications. These findings correspond to the areas recalled on the screening examination dated 2022. No other suspicious masses, calcifications, or other signs of malignancy are  identified. On R US, at 3 o'clock subareolar there is a 0.6 x 0.4 x 0.4 cm oval, hypoechoic mass with indistinct margins. At 4 o'clock subareolar right breast, there is a 0.7 x 0.3 x 0.6 cm benign, simple cyst. These correspond to the mammographic findings in these regions. No other suspicious sonographic abnormality is seen. Specifically, no suspicious sonographic correlate is seen for the focal asymmetry in the 12 o'clock left breast. Benign-appearing lymph nodes are noted in the bilateral axillae. BIRADS-4 suspicious; biopsy recommended.    Bone density 9/21/22:   LUMBAR SPINE The L1 to L3 vertebral bone mineral density is equal to 1.121 g/cm squared with a T score of -0.5.  LEFT HIP The left femoral neck bone mineral density is equal to 0.744 g/cm squared with a T score of -2.1.   The left total hip bone mineral density is equal to 0.816 g/cm squared with a T score of -1.5.   RIGHT HIP The right femoral neck bone mineral density is equal to 0.694 g/cm squared with a T score of -2.5.  The right total hip bone mineral density is equal to 0.824 g/cm squared with a T score of -1.5.  FRAX 10-YEAR PROBABILITY OF FRACTURE: 21.4% risk of a major osteoporotic fracture and 2.9% risk of hip fracture in the next 10 years    Impression: Osteoporosis      Pathology:  3/16/2022:   [1] LEFT BREAST POSTERIOR DEPTH AMORPHOUS GROUPED CALCIFICATIONS CENTRAL REGION: DUCTAL CARCINOMA IN SITU, LOW GRADE CRIBRIFORM/MICROPAPILLARY TYPE, WITH MICROCALCIFICATIONS. DCIS is present on two core biopsies, the largest focus measuring less than 3 mm.   [2] LEFT BREAST 12 O' CLOCK ANTERIOR DEPTH FOCAL ASYMMETRY WITH HETEROGENOUS CALCIFICATIONS:SCLEROTIC FIBROADENOMATOID NODULE WITH DYSTROPIC CALCIFICATION.   [3] RIGHT BREAST 3 O' CLOCK SUBAREOLAR MASS: INVASIVE DUCTAL CARCINOMA, SOTO-ROMERO GRADE 1.  Carcinoma is present on two core biopsies and measures 5 mm.    %, %, Her2 neg, Ki67 58%.    4/25/2022:  [1]  BREAST, LEFT, SIMPLE  MASTECTOMY: DIMINUTIVE FOCUS OF RESIDUAL LOW GRADE DUCTAL CARCINOMA IN SITU.  [2]  BREAST, RIGHT, SIMPLE MASTECTOMY: INVASIVE DUCTAL CARCINOMA, LOW GRADE, GRADE 1(OF 3).  -Tumor size:  4.0 mm. pT Category:  pT1a  pN0  BREAST, RIGHT, AXILLARY SENTINEL NODE #4/4: NEGATIVE FOR METASTATIC CARCINOMA.  The patient's previous history of low grade invasive ductal carcinoma is noted from surgical pathology report [id number] ER (100%), WA (100%), HER2 negative, Ki-67 high (58%).         CLINICAL HISTORY:       Patient: Riana Pastrana is a 69 y.o. female kindly referred for history of breast cancer.  I do not have record of her diagnosis and treatment of her original breast cancer.    Patient states that she was diagnosed with breast cancer in 2005.  She had Left lumpectomy done, with no chemotherapy or radiation. She took tamoxifen for 5 years. She reports that she was treated by Dr. Robbin Kenny. She says that she was seen at [facility], but since her insurance has changed she would like to establish care here.     Patient reports menarche at 13. She had 3 pregnancies, 2 live children, 1 miscarriage. Her first child was at age 20.She had a partial hysterectomy at age 29. She admits to oral contraceptives for about 3 years. No hormone replacement therapy.     Her sister had ovarian cancer diagnosed in her 50's, currently still alive at age 80.    She reports that she had a mass in her stomach and it was removed in 2017 along with her ovaries at [facility].  Again I do not have the records, but imaging studies none here include CT scan of the abdomen and pelvis done on 5/21/2017 for an abdominal distention showed a 22 x 21 x 16 cm complex septated peripherally enhancing mass which appears to arise from the right adnexa, presumably ovarian in origin.  Uterus not identified.  Cystic mass in the abdomen and pelvis displaces the urinary bladder inferiorly.  Liver is is heterogeneous in attenuation and contains 2  low-attenuation masses in the dome, too small to accurately characterize by CT.  Spleen normal in size. CT of the chest showed cardiomegaly with moderate pericardial effusion.  Bilateral lower lobe consolidation with atelectasis and bilateral pleural effusions, left greater than right. Large abdominal cystic mass of unknown etiology. Further assessment with dedicated imaging of the abdomen and pelvis recommended. US pelvis 5/22/2021:  A large, multilocular mass consistent with the CT findings is identified measuring 27.5 x 20.1 x 19.6 cm. There are multiple septations or separate fluid loculations in the large cystic mass. Low level internal echogenicity is also visible in the cyst fluid with dependent debris also identified. No free pelvic fluid is visible. This lesion is suspicious for neoplasm of pelvic and likely ovarian origin. The uterus is not identified and surgically absent by patient history. 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  Ascites  suggestive of peritoneal implants,  the ascites is new from the previous study. No Path available    On 2/7/2022 screening mammogram: INCOMPLETE: NEEDS ADDITIONAL IMAGING EVALUATION  Right breast focal asymmetries, left breast calcifications, and left breast focal asymmetry with calcifications need further evaluation. BI-RADS 0: Incomplete. Need additional imaging evaluation.     On 2/24/2022 Diagnostic right mammogram and Breast US: SUSPICIOUS OF MALIGNANCY  1. Oval, hypoechoic mass with indistinct margins in the 3:00 subareolar right breast is suspicious. Right breast ultrasound-guided biopsy is recommended.  2. Amorphous, grouped calcifications in the central left breast, posterior depth, are suspicious. Left breast stereotactic guided biopsy is recommended.  3. Focal asymmetry with associated heterogeneous calcifications in the 12:00 left breast, anterior depth, is suspicious. Left breast stereotactic guided  biopsy is recommended.     On 3/16/2022 she is schedule for breast bx. otherwise she is doing well and voices no concerns.  No fever, chills, sweats.  No chest pain or shortness of breath.  No changes in bowel habits.  No abdominal or pelvic pain.  No neurological symptoms.    [1] LEFT BREAST POSTERIOR DEPTH AMORPHOUS GROUPED CALCIFICATIONS CENTRAL REGION:  DUCTAL CARCINOMA IN SITU, LOW GRADE CRIBRIFORM/MICROPAPILLARY TYPE, WITH MICROCALCIFICATIONS.  COMMENT: DCIS is present on two core biopsies, the largest focus measuring less than 3 mm. The results of ER/MA analysis will be the subject of an addendum report.  [2] LEFT BREAST 12 O' CLOCK ANTERIOR DEPTH FOCAL ASYMMETRY WITH HETEROGENOUS CALCIFICATIONS:  SCLEROTIC FIBROADENOMATOID NODULE WITH DYSTROPIC CALCIFICATION.  [3] RIGHT BREAST 3 O' CLOCK SUBAREOLAR MASS:  INVASIVE DUCTAL CARCINOMA, SOTO-ROMERO GRADE 1.  COMMENT: Carcinoma is present on two core biopsies and measures 5 mm.    %, %, Her2 neg, Ki67 58%.      Oncotype showed a recurrence score of 18 with a distant recurrent risk at 9 years of 5% with AI or tamoxifen alone and <1% absolute chemotherapy benefit.     Chief Complaint: 6 Month Follow Up (DXA Bone Density Results.)          Interval History:  Patient presents today for follow up in surveillance of her breast cancer. She is taking Letrozole and tolerating well (started March 2022). She is also taking Prolia, next due on 10/18/24. She denies any fever, chills, sweats. No chest pain or shortness of breath.  No changes in bowel habits.  She had a bone density test on 9/23/24 that showed improvement since 9/2022.     Wt Readings from Last 7 Encounters:   10/03/24 96.3 kg (212 lb 4.8 oz)   08/23/24 96.2 kg (212 lb)   06/05/24 97.5 kg (215 lb)   06/04/24 95.3 kg (210 lb)   04/18/24 99.3 kg (219 lb)   04/12/24 98.9 kg (218 lb)   03/28/24 98.8 kg (217 lb 12.8 oz)   ]      Past Medical History:   Diagnosis Date    History of bilateral breast  cancer     left in 2007; bilateral in 2022    Hyperlipidemia 5/13/2022    Hypertension     Hypothyroidism 5/13/2022    Severe obesity (BMI 35.0-39.9) with comorbidity 5/13/2022      Past Surgical History:   Procedure Laterality Date    BREAST BIOPSY      BREAST SURGERY      HYSTERECTOMY      TONSILLECTOMY       Family History   Problem Relation Name Age of Onset    Ovarian cancer Sister  50            Review of patient's allergies indicates:   Allergen Reactions    Penicillins Other (See Comments)     UNKNOWN REACTION. ALLERGY SINCE CHILDHOOD        Current Outpatient Medications on File Prior to Visit   Medication Sig Dispense Refill    atorvastatin (LIPITOR) 20 MG tablet TAKE 1 TABLET EVERY DAY 90 tablet 3    denosumab (PROLIA) 60 mg/mL Syrg Inject 60 mg into the skin every 6 (six) months.      ergocalciferol (ERGOCALCIFEROL) 50,000 unit Cap Take 1 capsule (50,000 Units total) by mouth twice a week. 42 capsule 1    furosemide (LASIX) 20 MG tablet Take 1 tablet (20 mg total) by mouth once daily. 90 tablet 1    hydroCHLOROthiazide (HYDRODIURIL) 25 MG tablet TAKE 1 TABLET EVERY DAY 90 tablet 3    letrozole (FEMARA) 2.5 mg Tab Take 1 tablet (2.5 mg total) by mouth once daily. 30 tablet 4    levothyroxine (SYNTHROID) 50 MCG tablet TAKE 1 TABLET BEFORE BREAKFAST. 90 tablet 3    magnesium 200 mg Tab Take 1 tablet by mouth Daily.      meloxicam (MOBIC) 7.5 MG tablet TAKE 1 TABLET EVERY DAY AS NEEDED FOR PAIN 30 tablet 10    metoprolol tartrate (LOPRESSOR) 25 MG tablet TAKE 1 TABLET TWICE DAILY 180 tablet 3    multivit-min/iron/folic/lutein (CENTRUM SILVER WOMEN ORAL) Take 1 tablet by mouth once daily.      potassium chloride (MICRO-K) 10 MEQ CpSR Take 2 capsules (20 mEq total) by mouth once daily. 60 capsule 0     No current facility-administered medications on file prior to visit.      Review of Systems   Constitutional:  Negative for activity change, appetite change, chills, fatigue, fever and unexpected weight change.  "  HENT:  Negative for mouth dryness, mouth sores, nosebleeds, sore throat and trouble swallowing.    Eyes:  Negative for visual disturbance.   Respiratory:  Negative for cough, chest tightness and shortness of breath.    Cardiovascular:  Negative for chest pain, palpitations and leg swelling.   Gastrointestinal:  Negative for abdominal distention, abdominal pain, blood in stool, change in bowel habit, constipation, diarrhea, nausea and vomiting.   Endocrine: Negative.    Genitourinary:  Negative for dysuria, frequency, hematuria and urgency.   Musculoskeletal:  Positive for arthralgias. Negative for back pain, myalgias and neck pain.   Integumentary:  Negative for rash.   Neurological:  Negative for dizziness, tremors, syncope, speech difficulty, weakness, light-headedness, numbness, headaches and memory loss.   Hematological:  Does not bruise/bleed easily.   Psychiatric/Behavioral:  Negative for confusion and suicidal ideas.               Vitals:    10/03/24 1329   BP: 123/77   BP Location: Right arm   Patient Position: Sitting   Pulse: 62   Resp: 18   Temp: 97.6 °F (36.4 °C)   TempSrc: Oral   SpO2: 96%   Weight: 96.3 kg (212 lb 4.8 oz)   Height: 5' 4" (1.626 m)                Physical Exam  Vitals and nursing note reviewed.   Constitutional:       General: She is not in acute distress.     Appearance: Normal appearance. She is well-developed. She is obese.   HENT:      Head: Normocephalic and atraumatic.      Mouth/Throat:      Mouth: Mucous membranes are moist.   Eyes:      General: No scleral icterus.     Extraocular Movements: Extraocular movements intact.      Conjunctiva/sclera: Conjunctivae normal.      Pupils: Pupils are equal, round, and reactive to light.   Neck:      Vascular: No JVD.   Cardiovascular:      Rate and Rhythm: Normal rate. Rhythm irregularly irregular.      Heart sounds: No murmur heard.  Pulmonary:      Effort: Pulmonary effort is normal.      Breath sounds: Normal breath sounds. No " wheezing or rhonchi.   Chest:      Chest wall: No deformity or tenderness.   Breasts:     Right: Absent. No swelling, mass, nipple discharge, skin change or tenderness.      Left: Absent. No swelling, mass, nipple discharge, skin change or tenderness.   Abdominal:      General: Bowel sounds are normal. There is no distension.      Palpations: Abdomen is soft. There is no mass.      Tenderness: There is no abdominal tenderness.   Musculoskeletal:         General: No swelling or deformity.      Cervical back: Neck supple.   Lymphadenopathy:      Head:      Right side of head: No submandibular adenopathy.      Left side of head: No submandibular adenopathy.      Cervical: No cervical adenopathy.      Upper Body:      Right upper body: No supraclavicular or axillary adenopathy.      Left upper body: No supraclavicular or axillary adenopathy.      Lower Body: No right inguinal adenopathy. No left inguinal adenopathy.   Skin:     General: Skin is warm.      Coloration: Skin is not jaundiced.      Findings: No lesion or rash.      Nails: There is no clubbing.   Neurological:      General: No focal deficit present.      Mental Status: She is alert and oriented to person, place, and time.      Cranial Nerves: Cranial nerves 2-12 are intact.      Sensory: Sensation is intact.      Motor: Motor function is intact.      Gait: Gait is intact.   Psychiatric:         Attention and Perception: Attention normal.         Mood and Affect: Mood and affect normal.         Speech: Speech normal.         Behavior: Behavior is cooperative.         Thought Content: Thought content normal.         Cognition and Memory: Cognition normal.         Judgment: Judgment normal.       ECOG SCORE             Laboratory:   Latest Reference Range & Units 05/10/22 10:54   WBC 4.5 - 11.5 x10(3)/mcL 6.0   RBC 4.20 - 5.40 x10(6)/mcL 4.08 (L)   Hemoglobin 12.0 - 16.0 gm/dL 12.6   Hematocrit 37.0 - 47.0 % 38.2   MCV 80.0 - 94.0 fL 93.6   MCH 27.0 - 31.0 pg  30.9   MCHC 33.0 - 36.0 mg/dL 33.0   RDW 11.5 - 17.0 % 12.5   Platelets 130 - 400 x10(3)/mcL 255   (L): Data is abnormally low       Assessment:       1) Left breast cancer-diagnosed 2005   --lumpectomy/SLNB, XRT, and 5 years of Tamoxifen   2) Mucinous cystosarcoma-Dx 2017    --SP BSO   3) Bilateral breast cancer   --DCIS-clinical anatomic stage IA / prognostic stage IA (cT1b cN0 M0) left breast Dx 3/16/2022   --Clinical anatomic stage IA / prognostic stage IA (cT1b cN0 M0) Right breast    --%, %, Her 2 neg, Ki 67 58%, Grade 1   --Bilateral mastectomies 4/25/2022, Right SLND      Plan:       Started Femara 3/2022. Prolia started in 10/2022.  Baseline DEXA scan with osteoporosis --right femoral neck bone mineral density with a T score of -2.5. Otherwise osteopenia. She was started on Prolia and recently bone density on 9/23/24 showed improvement.    Continue Femara  Continue Prolia - next due 10/18/24  Bone density - due 9/2026  Continue Calcium and Vit D supplements and weight bearing exercises  No breast imaging as she has bilateral mastectomies  RTC  in 6 months with labs: CBC, CMP, CA 27-29    Encouraged to call with questions or problems  The patient was given ample opportunity to ask questions and they were all answered to satisfaction; patient demonstrated understanding of what we discussed and is agreeable to the plan.    NEREIDA Robert

## 2024-10-04 DIAGNOSIS — I10 PRIMARY HYPERTENSION: ICD-10-CM

## 2024-10-04 RX ORDER — METOPROLOL TARTRATE 25 MG/1
TABLET, FILM COATED ORAL
Qty: 180 TABLET | Refills: 3 | Status: SHIPPED | OUTPATIENT
Start: 2024-10-04

## 2024-10-04 RX ORDER — HYDROCHLOROTHIAZIDE 25 MG/1
TABLET ORAL
Qty: 90 TABLET | Refills: 3 | Status: SHIPPED | OUTPATIENT
Start: 2024-10-04

## 2024-10-07 LAB — CANCER AG27-29 SERPL-ACNC: 22.1 U/ML

## 2024-10-09 DIAGNOSIS — Z17.0 MALIGNANT NEOPLASM OF UPPER-INNER QUADRANT OF RIGHT BREAST IN FEMALE, ESTROGEN RECEPTOR POSITIVE: ICD-10-CM

## 2024-10-09 DIAGNOSIS — C50.211 MALIGNANT NEOPLASM OF UPPER-INNER QUADRANT OF RIGHT BREAST IN FEMALE, ESTROGEN RECEPTOR POSITIVE: ICD-10-CM

## 2024-10-09 RX ORDER — LETROZOLE 2.5 MG/1
2.5 TABLET, FILM COATED ORAL DAILY
Qty: 30 TABLET | Refills: 6 | Status: SHIPPED | OUTPATIENT
Start: 2024-10-09 | End: 2025-05-07

## 2024-10-18 ENCOUNTER — INFUSION (OUTPATIENT)
Dept: INFUSION THERAPY | Facility: HOSPITAL | Age: 69
End: 2024-10-18
Attending: INTERNAL MEDICINE
Payer: MEDICARE

## 2024-10-18 VITALS
BODY MASS INDEX: 36.62 KG/M2 | RESPIRATION RATE: 18 BRPM | SYSTOLIC BLOOD PRESSURE: 137 MMHG | DIASTOLIC BLOOD PRESSURE: 83 MMHG | HEART RATE: 61 BPM | WEIGHT: 214.5 LBS | OXYGEN SATURATION: 96 % | HEIGHT: 64 IN | TEMPERATURE: 98 F

## 2024-10-18 DIAGNOSIS — Z79.811 USE OF LETROZOLE (FEMARA): ICD-10-CM

## 2024-10-18 DIAGNOSIS — M81.0 OSTEOPOROSIS, UNSPECIFIED OSTEOPOROSIS TYPE, UNSPECIFIED PATHOLOGICAL FRACTURE PRESENCE: ICD-10-CM

## 2024-10-18 DIAGNOSIS — C50.211 MALIGNANT NEOPLASM OF UPPER-INNER QUADRANT OF RIGHT BREAST IN FEMALE, ESTROGEN RECEPTOR POSITIVE: Primary | ICD-10-CM

## 2024-10-18 DIAGNOSIS — Z17.0 MALIGNANT NEOPLASM OF UPPER-INNER QUADRANT OF RIGHT BREAST IN FEMALE, ESTROGEN RECEPTOR POSITIVE: Primary | ICD-10-CM

## 2024-10-18 PROCEDURE — 96372 THER/PROPH/DIAG INJ SC/IM: CPT

## 2024-10-18 PROCEDURE — 63600175 PHARM REV CODE 636 W HCPCS: Mod: JZ,JG | Performed by: NURSE PRACTITIONER

## 2024-10-18 RX ADMIN — DENOSUMAB 60 MG: 60 INJECTION SUBCUTANEOUS at 08:10

## 2024-10-22 DIAGNOSIS — E87.6 HYPOKALEMIA: Primary | ICD-10-CM

## 2024-10-22 RX ORDER — POTASSIUM CHLORIDE 750 MG/1
20 CAPSULE, EXTENDED RELEASE ORAL
Qty: 60 CAPSULE | Refills: 11 | Status: SHIPPED | OUTPATIENT
Start: 2024-10-22

## 2024-11-06 DIAGNOSIS — N25.81 SECONDARY HYPERPARATHYROIDISM: ICD-10-CM

## 2024-11-06 DIAGNOSIS — E55.9 VITAMIN D INSUFFICIENCY: ICD-10-CM

## 2024-11-06 RX ORDER — ERGOCALCIFEROL 1.25 MG/1
CAPSULE ORAL
Qty: 24 CAPSULE | Refills: 3 | Status: SHIPPED | OUTPATIENT
Start: 2024-11-06

## 2024-12-03 DIAGNOSIS — M81.0 AGE-RELATED OSTEOPOROSIS WITHOUT CURRENT PATHOLOGICAL FRACTURE: ICD-10-CM

## 2024-12-03 DIAGNOSIS — E55.9 VITAMIN D INSUFFICIENCY: ICD-10-CM

## 2024-12-03 DIAGNOSIS — Z13.6 SCREENING FOR CARDIOVASCULAR, RESPIRATORY, AND GENITOURINARY DISEASES: ICD-10-CM

## 2024-12-03 DIAGNOSIS — E87.6 HYPOKALEMIA: ICD-10-CM

## 2024-12-03 DIAGNOSIS — Z13.21 SCREENING FOR ENDOCRINE, NUTRITIONAL, METABOLIC AND IMMUNITY DISORDER: ICD-10-CM

## 2024-12-03 DIAGNOSIS — N25.81 SECONDARY HYPERPARATHYROIDISM: Primary | ICD-10-CM

## 2024-12-03 DIAGNOSIS — Z00.00 MEDICARE ANNUAL WELLNESS VISIT, SUBSEQUENT: ICD-10-CM

## 2024-12-03 DIAGNOSIS — I10 PRIMARY HYPERTENSION: ICD-10-CM

## 2024-12-03 DIAGNOSIS — E03.9 ACQUIRED HYPOTHYROIDISM: ICD-10-CM

## 2024-12-03 DIAGNOSIS — Z13.228 SCREENING FOR ENDOCRINE, NUTRITIONAL, METABOLIC AND IMMUNITY DISORDER: ICD-10-CM

## 2024-12-03 DIAGNOSIS — Z13.83 SCREENING FOR CARDIOVASCULAR, RESPIRATORY, AND GENITOURINARY DISEASES: ICD-10-CM

## 2024-12-03 DIAGNOSIS — E78.2 MIXED HYPERLIPIDEMIA: ICD-10-CM

## 2024-12-03 DIAGNOSIS — I48.0 PAROXYSMAL ATRIAL FIBRILLATION: ICD-10-CM

## 2024-12-03 DIAGNOSIS — Z13.29 SCREENING FOR ENDOCRINE, NUTRITIONAL, METABOLIC AND IMMUNITY DISORDER: ICD-10-CM

## 2024-12-03 DIAGNOSIS — E83.52 HYPERCALCEMIA: ICD-10-CM

## 2024-12-03 DIAGNOSIS — R79.89 INCREASED PTH LEVEL: ICD-10-CM

## 2024-12-03 DIAGNOSIS — Z13.0 SCREENING FOR ENDOCRINE, NUTRITIONAL, METABOLIC AND IMMUNITY DISORDER: ICD-10-CM

## 2024-12-03 DIAGNOSIS — R73.09 IMPAIRED GLUCOSE REGULATION: ICD-10-CM

## 2024-12-03 DIAGNOSIS — Z13.89 SCREENING FOR CARDIOVASCULAR, RESPIRATORY, AND GENITOURINARY DISEASES: ICD-10-CM

## 2024-12-06 ENCOUNTER — TELEPHONE (OUTPATIENT)
Dept: INTERNAL MEDICINE | Facility: CLINIC | Age: 69
End: 2024-12-06
Payer: MEDICARE

## 2024-12-06 NOTE — TELEPHONE ENCOUNTER
----- Message from Med Assistant Johnson sent at 12/3/2024  8:06 AM CST -----  Regarding: PV Friday 12-13-24  Wellness Appointment    Fasting wellness labs ordered and ready to do.     Last Wellness 12-8-23

## 2024-12-10 ENCOUNTER — LAB VISIT (OUTPATIENT)
Dept: LAB | Facility: HOSPITAL | Age: 69
End: 2024-12-10
Attending: INTERNAL MEDICINE
Payer: MEDICARE

## 2024-12-10 DIAGNOSIS — M81.0 AGE-RELATED OSTEOPOROSIS WITHOUT CURRENT PATHOLOGICAL FRACTURE: ICD-10-CM

## 2024-12-10 DIAGNOSIS — E55.9 VITAMIN D INSUFFICIENCY: ICD-10-CM

## 2024-12-10 DIAGNOSIS — Z00.00 MEDICARE ANNUAL WELLNESS VISIT, SUBSEQUENT: ICD-10-CM

## 2024-12-10 DIAGNOSIS — I48.0 PAROXYSMAL ATRIAL FIBRILLATION: ICD-10-CM

## 2024-12-10 DIAGNOSIS — I10 PRIMARY HYPERTENSION: ICD-10-CM

## 2024-12-10 DIAGNOSIS — Z13.228 SCREENING FOR ENDOCRINE, NUTRITIONAL, METABOLIC AND IMMUNITY DISORDER: ICD-10-CM

## 2024-12-10 DIAGNOSIS — R73.09 IMPAIRED GLUCOSE REGULATION: ICD-10-CM

## 2024-12-10 DIAGNOSIS — Z13.6 SCREENING FOR CARDIOVASCULAR, RESPIRATORY, AND GENITOURINARY DISEASES: ICD-10-CM

## 2024-12-10 DIAGNOSIS — Z13.89 SCREENING FOR CARDIOVASCULAR, RESPIRATORY, AND GENITOURINARY DISEASES: ICD-10-CM

## 2024-12-10 DIAGNOSIS — N25.81 SECONDARY HYPERPARATHYROIDISM: ICD-10-CM

## 2024-12-10 DIAGNOSIS — R79.89 INCREASED PTH LEVEL: ICD-10-CM

## 2024-12-10 DIAGNOSIS — Z13.29 SCREENING FOR ENDOCRINE, NUTRITIONAL, METABOLIC AND IMMUNITY DISORDER: ICD-10-CM

## 2024-12-10 DIAGNOSIS — E83.52 HYPERCALCEMIA: ICD-10-CM

## 2024-12-10 DIAGNOSIS — E78.2 MIXED HYPERLIPIDEMIA: ICD-10-CM

## 2024-12-10 DIAGNOSIS — E03.9 ACQUIRED HYPOTHYROIDISM: ICD-10-CM

## 2024-12-10 DIAGNOSIS — Z13.21 SCREENING FOR ENDOCRINE, NUTRITIONAL, METABOLIC AND IMMUNITY DISORDER: ICD-10-CM

## 2024-12-10 DIAGNOSIS — Z13.0 SCREENING FOR ENDOCRINE, NUTRITIONAL, METABOLIC AND IMMUNITY DISORDER: ICD-10-CM

## 2024-12-10 DIAGNOSIS — Z13.83 SCREENING FOR CARDIOVASCULAR, RESPIRATORY, AND GENITOURINARY DISEASES: ICD-10-CM

## 2024-12-10 DIAGNOSIS — E87.6 HYPOKALEMIA: ICD-10-CM

## 2024-12-10 LAB
25(OH)D3+25(OH)D2 SERPL-MCNC: 24 NG/ML (ref 30–80)
ALBUMIN SERPL-MCNC: 3.8 G/DL (ref 3.4–4.8)
ALBUMIN/GLOB SERPL: 1.1 RATIO (ref 1.1–2)
ALP SERPL-CCNC: 114 UNIT/L (ref 40–150)
ALT SERPL-CCNC: 55 UNIT/L (ref 0–55)
ANION GAP SERPL CALC-SCNC: 7 MEQ/L
AST SERPL-CCNC: 75 UNIT/L (ref 5–34)
BASOPHILS # BLD AUTO: 0.06 X10(3)/MCL
BASOPHILS NFR BLD AUTO: 1 %
BILIRUB SERPL-MCNC: 0.7 MG/DL
BUN SERPL-MCNC: 11.4 MG/DL (ref 9.8–20.1)
CALCIUM SERPL-MCNC: 9.1 MG/DL (ref 8.4–10.2)
CHLORIDE SERPL-SCNC: 102 MMOL/L (ref 98–107)
CHOLEST SERPL-MCNC: 177 MG/DL
CHOLEST/HDLC SERPL: 3 {RATIO} (ref 0–5)
CO2 SERPL-SCNC: 31 MMOL/L (ref 23–31)
CREAT SERPL-MCNC: 0.78 MG/DL (ref 0.55–1.02)
CREAT/UREA NIT SERPL: 15
EOSINOPHIL # BLD AUTO: 0.11 X10(3)/MCL (ref 0–0.9)
EOSINOPHIL NFR BLD AUTO: 1.8 %
ERYTHROCYTE [DISTWIDTH] IN BLOOD BY AUTOMATED COUNT: 13.1 % (ref 11.5–17)
EST. AVERAGE GLUCOSE BLD GHB EST-MCNC: 139.9 MG/DL
GFR SERPLBLD CREATININE-BSD FMLA CKD-EPI: >60 ML/MIN/1.73/M2
GLOBULIN SER-MCNC: 3.5 GM/DL (ref 2.4–3.5)
GLUCOSE SERPL-MCNC: 105 MG/DL (ref 82–115)
HBA1C MFR BLD: 6.5 %
HCT VFR BLD AUTO: 41.6 % (ref 37–47)
HDLC SERPL-MCNC: 58 MG/DL (ref 35–60)
HGB BLD-MCNC: 13.8 G/DL (ref 12–16)
IMM GRANULOCYTES # BLD AUTO: 0.02 X10(3)/MCL (ref 0–0.04)
IMM GRANULOCYTES NFR BLD AUTO: 0.3 %
LDLC SERPL CALC-MCNC: 100 MG/DL (ref 50–140)
LYMPHOCYTES # BLD AUTO: 1.85 X10(3)/MCL (ref 0.6–4.6)
LYMPHOCYTES NFR BLD AUTO: 30.6 %
MCH RBC QN AUTO: 31.4 PG (ref 27–31)
MCHC RBC AUTO-ENTMCNC: 33.2 G/DL (ref 33–36)
MCV RBC AUTO: 94.8 FL (ref 80–94)
MONOCYTES # BLD AUTO: 0.46 X10(3)/MCL (ref 0.1–1.3)
MONOCYTES NFR BLD AUTO: 7.6 %
NEUTROPHILS # BLD AUTO: 3.55 X10(3)/MCL (ref 2.1–9.2)
NEUTROPHILS NFR BLD AUTO: 58.7 %
NRBC BLD AUTO-RTO: 0 %
PLATELET # BLD AUTO: 203 X10(3)/MCL (ref 130–400)
PMV BLD AUTO: 10.9 FL (ref 7.4–10.4)
POTASSIUM SERPL-SCNC: 3.5 MMOL/L (ref 3.5–5.1)
PROT SERPL-MCNC: 7.3 GM/DL (ref 5.8–7.6)
PTH-INTACT SERPL-MCNC: 107.6 PG/ML (ref 8.7–77)
RBC # BLD AUTO: 4.39 X10(6)/MCL (ref 4.2–5.4)
SODIUM SERPL-SCNC: 140 MMOL/L (ref 136–145)
T3 SERPL-MCNC: 87.31 NG/DL (ref 60–180)
T4 FREE SERPL-MCNC: 1.12 NG/DL (ref 0.7–1.48)
TRIGL SERPL-MCNC: 94 MG/DL (ref 37–140)
TSH SERPL-ACNC: 1.36 UIU/ML (ref 0.35–4.94)
VLDLC SERPL CALC-MCNC: 19 MG/DL
WBC # BLD AUTO: 6.05 X10(3)/MCL (ref 4.5–11.5)

## 2024-12-10 PROCEDURE — 84439 ASSAY OF FREE THYROXINE: CPT

## 2024-12-10 PROCEDURE — 80053 COMPREHEN METABOLIC PANEL: CPT

## 2024-12-10 PROCEDURE — 36415 COLL VENOUS BLD VENIPUNCTURE: CPT

## 2024-12-10 PROCEDURE — 82306 VITAMIN D 25 HYDROXY: CPT

## 2024-12-10 PROCEDURE — 84480 ASSAY TRIIODOTHYRONINE (T3): CPT

## 2024-12-10 PROCEDURE — 80061 LIPID PANEL: CPT

## 2024-12-10 PROCEDURE — 83970 ASSAY OF PARATHORMONE: CPT

## 2024-12-10 PROCEDURE — 83036 HEMOGLOBIN GLYCOSYLATED A1C: CPT

## 2024-12-10 PROCEDURE — 84443 ASSAY THYROID STIM HORMONE: CPT

## 2024-12-10 PROCEDURE — 85025 COMPLETE CBC W/AUTO DIFF WBC: CPT

## 2024-12-13 ENCOUNTER — OFFICE VISIT (OUTPATIENT)
Dept: INTERNAL MEDICINE | Facility: CLINIC | Age: 69
End: 2024-12-13
Payer: MEDICARE

## 2024-12-13 VITALS
SYSTOLIC BLOOD PRESSURE: 134 MMHG | BODY MASS INDEX: 36.7 KG/M2 | HEART RATE: 65 BPM | OXYGEN SATURATION: 96 % | HEIGHT: 64 IN | WEIGHT: 215 LBS | DIASTOLIC BLOOD PRESSURE: 76 MMHG

## 2024-12-13 DIAGNOSIS — Z23 NEED FOR SHINGLES VACCINE: ICD-10-CM

## 2024-12-13 DIAGNOSIS — I48.0 PAROXYSMAL ATRIAL FIBRILLATION: Chronic | ICD-10-CM

## 2024-12-13 DIAGNOSIS — E03.9 ACQUIRED HYPOTHYROIDISM: Chronic | ICD-10-CM

## 2024-12-13 DIAGNOSIS — Z00.00 MEDICARE ANNUAL WELLNESS VISIT, SUBSEQUENT: Primary | ICD-10-CM

## 2024-12-13 DIAGNOSIS — Z00.00 ENCOUNTER FOR PREVENTIVE HEALTH EXAMINATION: ICD-10-CM

## 2024-12-13 DIAGNOSIS — E66.01 SEVERE OBESITY (BMI 35.0-39.9) WITH COMORBIDITY: Chronic | ICD-10-CM

## 2024-12-13 DIAGNOSIS — R73.09 IMPAIRED GLUCOSE REGULATION: ICD-10-CM

## 2024-12-13 DIAGNOSIS — Z23 NEED FOR INFLUENZA VACCINATION: ICD-10-CM

## 2024-12-13 DIAGNOSIS — I10 PRIMARY HYPERTENSION: Chronic | ICD-10-CM

## 2024-12-13 DIAGNOSIS — M81.0 AGE-RELATED OSTEOPOROSIS WITHOUT CURRENT PATHOLOGICAL FRACTURE: Chronic | ICD-10-CM

## 2024-12-13 NOTE — PATIENT INSTRUCTIONS
Medicare Annual Wellness Visit      Patient Name: Riana Pastrana  Today's Date: 12/13/2024    Below you will find your 5-10 year Screening Plan Recommendations:  Health Maintenance       Date Due Completion Date    Hepatitis C Screening Never done ---    Diabetes Urine Screening Never done ---    Foot Exam Never done ---    TETANUS VACCINE Never done ---    Shingles Vaccine (1 of 2) Never done ---    Influenza Vaccine (1) 09/01/2024 10/25/2023    COVID-19 Vaccine (6 - 2024-25 season) 09/01/2024 10/25/2023    Eye Exam 12/18/2024 12/18/2023    Hemoglobin A1c 06/10/2025 12/10/2024    Low Dose Statin 10/03/2025 10/3/2024    Lipid Panel 12/10/2025 12/10/2024    DEXA Scan 09/23/2027 9/23/2024    Colorectal Cancer Screening 11/16/2028 11/16/2018          Below is your summarized Personalized Prevention Plan that addresses any concerns we discussed today at your visit. Please see attached detailed information specific to your Health Concerns.  Orders Placed This Encounter   Procedures    Microalbumin/creatinine urine ratio           The following information is provided to all patients.  This information is to help you find additional resources for any problems that may be affecting your health: Living healthy guide: www.[facility].[place].gov      Understanding Diabetes: www.diabetes.org      Eating healthy: www.cdc.gov/healthyweight      CDC home safety checklist: www.cdc.gov/steadi/patient.html      Agency on Aging: www.goea.[place].[facility]      Alcoholics anonymous (AA): www.aa.org      Physical Activity: www.jelly.nih.gov/pj3unqy      Tobacco use: www.quitwithusla.org                                 Patient Education       Weight Loss Tips   About this topic   More and more people are concerned about their weight. You can choose from many different programs. The goal of a weight loss program may be to cut down on calories or to lose extra weight through exercise.  Losing weight may mean changing your ideas about food.  "Going on a diet and losing weight does not mean starving yourself. It means cutting down on the amount of food you eat, making healthy food choices, and being active.  General   Ideally, you need to lose 1 to 2 pounds (0.5 to 1 kg) a week for a healthy weight loss. Losing too much weight too fast is not good. When you take in fewer calories, you will lose weight. Your ideal calorie and weight goal depends on your current age, weight, height, and personal goals. Ask your doctor or dietitian what your ideal weight is.  You need to burn 3500 calories to lose 1 pound (0.5 kg). That means cutting out 500 calories every day for 7 days. You can cut out 500 calories per day by eating or drinking fewer calories, burning them through exercise, or doing both. To lose that extra weight and stay healthy:  Take time to exercise.  Exercise regularly. Burn calories with activity and exercise. Exercise can help you lose weight and it also strengthens your muscles. Set a schedule where you will have time to do exercises. With just 30 to 60 minutes of exercise each day, you could burn 500 extra calories. Your metabolism stays elevated for a period of time after exercise.  If you don't have time for a 30 minute workout, try three 10 minute exercises each day.  If you work near your home, walk to work. Walking is a very good form of exercise.  Take a 20 minute walk each day. Walk during your lunch break. Park far away, so you have to walk more.  Take the steps instead of elevators. You will burn more calories this way.  If you have an illness, like diabetes or high blood pressure, ask your doctor how much exercise is right for you.  Choose healthy snacks.  Low calorie healthy snacks are a good thing. They help your blood sugar stay even and prevent you from overeating at meals. Choose a balanced snack, such as a small apple with 2 tablespoons (30 grams) of peanut butter.  Keep in mind, even "low calorie" foods can add up. Just because " you choose low calorie foods does not mean you do not have to count the calories you eat.  Pack a few fresh fruits or a small salad to take to work or school. Avoid buying a snack at the nearest vending machine.  When you feel thirsty, drink water. Water has no calories and is a very good thirst quencher.  Plan healthy meals.  Plan ahead. Keep a diary of foods that are low in calories. You can also make a list of meal plans for your breakfast, lunch, and dinner. Planning ahead will prevent you from eating out at a fast food place or restaurant.  Make a grocery list before shopping so you only buy food you need. Don't go to the store hungry.  Visit a dietitian. This person will help you make meal plans that will help you lose weight.  Add fiber to your meals. Adding fiber helps you to feel full for a longer amount of time.  Take care when eating out.  Choose lower fat and lower calorie meals. Try a seafood, lean meat, or vegetarian entrée.  Share a meal with a friend.  Try a salad and appetizer instead of an entree.  Ask for a to-go box when dinner is served and put half of your meal in it for a later meal.  Have fruit for dessert.  Drink water instead of other high calorie drinks.  Learn not to overeat.  Watch your portions. For example, the recommended serving size of meat is 3 ounces (90 grams). This is the size of a deck of playing cards. Two tablespoons (30 grams) of peanut butter is the size of a ping-pong ball. One medium fruit is the size of a baseball.  Use a smaller plate or glass during dinner for less calorie intake.  Try drinking a glass or two of water before eating. This may make you feel more full and help you to eat less.  Eat slowly. Take at least 30 minutes to eat. This gives time for your brain to tell your stomach you are full. This will help you avoid overeating.  Some people eat smaller meals more often to help not to overeat. If you can eat six small meals, make them healthy and low calorie.  If three meals are best for you, know your calorie level for the day and spread it out into three healthy low calorie meals.     What will the results be?   Losing weight may make you healthier. You also may have more energy for your daily activities. You may lower disease risk. You may also add years to your life.  What changes to diet are needed?   Learn how to read nutrition labels. Know the serving size. Knowing the calories in an item will help you make healthier choices and lose weight.  Keep a diary of the food you eat. This will help you count the calories you are taking in.  Make a menu in advance. This will help you make good choices to include in your diet.  Avoid eating 2 hours before bedtime to allow for digestion. If you eat right before you go to bed, you may also have worse heartburn.  Be sure to count the calories in the things you drink. You may want to stop drinking soda pop, beer, wine, and mixed drinks (alcohol). Some coffee drinks also have a lot of calories in them.  Who should use this diet?   A weight loss diet may be needed for people with a calculated body mass index of 25 and over. This means you are overweight or obese.  Who should not use this diet?   People with BMI of 18.5 or lower should not use this diet. Do not use this diet if your doctor does not recommend weight loss.  What foods are good to eat?   Choose foods that are nutritious. Remember, portion control is key. Even a low calorie food can become high in calories if you have too big of a serving. Here is a list of foods that are good to eat:  Vegetables:   Broccoli  Asparagus  Spinach  Green leafy vegetables  Tomatoes  Onions  Mushrooms  Cucumbers  Zucchini  Lean proteins:   Egg whites  Beans including kidney, navy, black, and chickpeas  Grilled, broiled, or baked skinless chicken breast  Grilled, broiled, or baked skinless turkey breast  Lean beef  Maries meat  Grilled, broiled, or baked fish  Beans  Nuts, such as almonds,  cashews, and pistachios  Seeds  Whole grains and carbs:   Oatmeal  Brown rice  Sweet potatoes  Cereal  Whole grain bread or pasta  Fruits:   Apples  Grapefruit  Blueberries  Oranges  Bananas  Grapes  Peaches  Pineapple  Strawberries  Dairy:   Fat-free or low-fat milk and cheese  Low fat yogurt  Soy, rice, or almond milk  What foods should be limited or avoided?   Limit or avoid foods that are high in calories like:  Junk foods  Fried foods  Fatty foods  Processed meats  Food with saturated and trans fat  Whole fat dairy products  Butter  Cheese  Ice cream  Food and drinks with a lot of sugar. Some examples are beer, wine, mixed drinks (alcohol), carbonated sodas, cakes, and cookies.  When do I need to call the doctor?   Weakness  Fast heartbeat  Dizziness  Helpful tips   Join a support group and an exercise group. It is much easier to lose weight if you have support and encouragement.  Do not skip meals. If you skip a meal, you most likely will overeat at that next meal.  Eat at the dining room table instead in front of the TV to help monitor intake.  Where can I learn more?   Academy of Nutrition and Dietetics  https://www.eatright.org/health/weight-loss/your-health-and-your-weight/back-to-basics-for-healthy-weight-loss   Centers for Disease Control and Prevention  https://www.cdc.gov/healthyweight/   Weight-Control Information Network  https://www.niddk.nih.gov/health-information/diet-nutrition/changing-habits-better-health   Last Reviewed Date   2021-08-09  Consumer Information Use and Disclaimer   This information is not specific medical advice and does not replace information you receive from your health care provider. This is only a brief summary of general information. It does NOT include all information about conditions, illnesses, injuries, tests, procedures, treatments, therapies, discharge instructions or life-style choices that may apply to you. You must talk with your health care provider for complete  information about your health and treatment options. This information should not be used to decide whether or not to accept your health care providers advice, instructions or recommendations. Only your health care provider has the knowledge and training to provide advice that is right for you.  Copyright   Copyright © 2021 Up[employer] Inc. and its affiliates and/or licensors. All rights reserved.    Patient Education       Health Risks of a High BMI   About this topic   Your weight and health depend on a few things. Doctors use a method called BMI or body mass index as a tool to learn more about your risk of having health problems. This tool uses your weight and your height to find your BMI.  BMI does not include things like your habits, where you live, family history, or amount of body fat. Some people with a normal BMI are still not healthy. Other people with a high BMI may be healthy. Most of the time, a person with a higher BMI is less healthy and will need more care. Ask your doctor for their view of your total health during a well visit or physical.  Being overweight or having a high BMI can hurt many parts of your body. Learn about your BMI and how your weight changes your health risks. Then you can make changes to keep yourself as healthy as you can.  General   Weighing too much can be very harmful to your body. It can cause many illnesses and can make it hard to move about.  Blood Sugar   You have a greater chance of having high blood sugar or diabetes if you weigh too much. Your body normally makes a hormone called insulin. The insulin allows your body to use the sugar in your blood.  The cells in your body may not be able to use insulin. Then your cells cannot get the sugar from your bloodstream that they need for energy. Your pancreas has to work extra hard to try and make enough insulin to keep your blood sugar healthy.  If you lose weight and exercise, your body is able to control your blood sugar  levels better. You may not need as much insulin to keep your blood sugar levels healthy.  Your Heart   Being overweight makes your heart work harder.  The blood vessels that bring blood to your heart muscle may become narrow or blocked and cause a heart attack or your heart may not pump as well as it should. Your heart rate may not be normal.  Losing weight can lower your chances of having problems with your heart.  High Blood Pressure   Your heart has to work harder to pump blood through a larger body and to make sure all of your cells have the oxygen they need.  As your heart has to work harder, your blood pressure goes up.  Being overweight can also harm your kidneys and this may also raise your blood pressure.  You may be able to lower your blood pressure through weight loss and routine exercise.  Stroke   Strokes are more likely to happen when someone has high blood pressure, heart problems, high blood sugar, or high cholesterol.  Being overweight puts you at a higher risk for all of these health problems. These problems put you at a higher risk for having a stroke.  Losing weight may help lower your blood pressure, which is the biggest risk factor for a stroke.  Cholesterol   Your cholesterol level is likely to be higher if you are overweight.  This can lead to narrowing of the blood vessels in your heart, neck, or other parts of your body. Then you may have chest pain or signs of low blood flow to a certain area. It can also lead to a heart attack or stroke.  Lowering your weight and changing what you eat may change your cholesterol levels.  Your Liver and Kidneys   You are more likely to have certain problems with your liver or kidneys if you have a high BMI.  Fatty liver disease is caused by a buildup of fat in your liver. Then your liver may not work as well as it should.  You are at a higher risk for diabetes if you are overweight. This illness can cause kidney problems.  There is no exact way to treat  fatty liver disease. By losing weight, you may keep your liver from getting any worse and help your liver work better.  By losing weight, you lower your chance of having kidney problems. If you already have kidney problems, weight loss may help keep your disease from getting worse.  Bone and Joint Problems   Weighing too much can put a lot of stress on your joints.  The extra weight may make the cartilage wear away more quickly from your bones. Your cartilage lines the surfaces of your bones to help your joints glide more easily.  When your cartilage is worn, your joints become stiff and sore.  Losing weight and exercising is one of the best ways to treat joint pain and stiffness. It can also ease the stress on your hips, knees, and back.  Cancer Risk   Gaining weight and poor health habits raise your risk for some kinds of cancer.  Healthy eating and exercise may lower your risk for some kinds of cancer.  Sleep   With a high BMI, you may have extra fat around your neck. This can make your airway smaller and put you at risk for a problem called sleep apnea. With this problem, your breathing starts and stops when you are sleeping.  Signs of sleep apnea include snoring, feeling sleepy during the day, and problems with focusing.  Sleep apnea can lead to heart problems such as increased risk for heart disease and arrhythmias like atrial fibrillation.  Losing weight can lower the amount of fat around your neck and ease sleep apnea problems.  Pregnancy   Your weight can affect how often you have a period. It may also make it harder for you to get pregnant. A high BMI can cause problems for both mom and baby.  If you are overweight and pregnant you are at a higher risk for high blood sugar or high blood pressure while you are pregnant.  You are also more likely to have your baby early or to need a C section.  Losing weight before you become pregnant may lower your chance for these problems. If you are pregnant, talk to  your doctor before you try to lose weight.  Depression   You are more likely to suffer from depression or low mood when you have a high BMI.  You may have a low mood because of low self-esteem, lack of activity, lack of sleep, and health problems caused by being overweight.  Losing weight and finding out the reasons you overeat are some of the steps to improve your quality of life and deal with depression.  Where can I learn more?   National [place] of Diabetes and Digestive and Kidney Diseases  https://www.niddk.nih.gov/health-information/weight-management/adult-overweight-obesity/health-risks   Last Reviewed Date   2021-09-15  Consumer Information Use and Disclaimer   This information is not specific medical advice and does not replace information you receive from your health care provider. This is only a brief summary of general information. It does NOT include all information about conditions, illnesses, injuries, tests, procedures, treatments, therapies, discharge instructions or life-style choices that may apply to you. You must talk with your health care provider for complete information about your health and treatment options. This information should not be used to decide whether or not to accept your health care providers advice, instructions or recommendations. Only your health care provider has the knowledge and training to provide advice that is right for you.  Copyright   Copyright © 2021 Up[employer]Date, Inc. and its affiliates and/or licensors. All rights reserved.    Patient Education       Exercise and Aging   General   Exercise can help older adults prevent falls and stay independent longer. Exercise may also help:  You have stronger muscles and bones and better balance  You lose weight and have more energy  Make your heart and lungs stronger  Lower your chance of health problems like heart disease, high blood sugar, or breast or colon cancer  Control conditions like high blood pressure and  diabetes  You manage stress and get better sleep  Your mood, self-esteem, and self-image  How you think, plan, and pay attention  There are four types of exercise: endurance, strength, balance, and flexibility.  Endurance exercises get your heart rate up and keep it up for a while. Most people should get at least 30 minutes of exercise that gets your heart rate up on 5 or more days each week. Walking, swimming, biking, or going up and down stairs are kinds of exercises to get your heart rate up. You do not have to do all 30 minutes at one time. Try doing 10 minutes 3 times a day.  Strength exercises build muscle. Lifting weights or doing knee bends are kinds of strength exercises.  Balance exercises help to prevent falls. Raise up on your toes or stand on one leg as a kind of balance exercise.  Flexibility exercises move your joints through a full range of motion and stretch your muscles. Bend forward in a chair to stretch your back, do stretches, or bend and extend your arms or legs as a kind of flexibility exercise. Try doing 10 minutes twice a week.  Plan to include all these exercise types in your exercise program. Always check with your doctor before you start a new exercise program.  Some people do not like formal exercise classes, but there are many ways to work your muscles each day. Here are some things you can do.  Use steps instead of elevators.  Park far away in parking lots to walk farther.  Use the time while you wait. Do knee bends as you hold onto the kitchen counter while you wait for your coffee to brew.  When you unload groceries, lift the bags or a container of milk a few times to exercise your arms and legs.  Walk the long way when you go somewhere.  After you walk to the bathroom, walk a few laps around the house before sitting down.  Try doing different standing exercises after you wash your hands each time in the bathroom.  Do balance exercises while you brush your teeth.  Do an exercise or  get up and walk during TV commercials.  Don't forget to exercise small muscle groups like your hands, fingers, ankles, toes, and neck. Wiggle, bend, flex, and rotate these joints from left to right and back to front to help to keep these joints flexible.  When do I need to call the doctor?   Stop exercising and talk to your doctor if you have any of these problems:  Dizziness  Shortness of breath  Pain or pressure in the chest, arms, throat, jaw, or back  Nausea or vomiting  Blood clots  Infection  Joint swelling  Open wounds  Recent hip, back, or eye surgery  New problems that start during exercise  Helpful tips   If you have a health problem like heart disease or diabetes, talk with your doctor about the best exercises for you.  Always warm up before you stretch. Heated muscles stretch much easier than cool muscles. Try to walk or bike at an easy pace for a few minutes to warm up your muscles.  Slow your pace again after you exercise to cool down and bring your heart rate down slowly.  Be sure you do not hold your breath when you exercise because it can raise your blood pressure. If you tend to hold your breath, try to count out loud when you exercise.  Never bounce when doing stretches. Use slow and steady motions and hold your stretch for 20 to 30 seconds.  Have a routine. Doing exercises before a meal may be a good way to get into a routine.  Set small goals for yourself when you start to exercise. Use a chart to see how much you are doing. Ask someone to exercise with you.  Exercise may be slightly uncomfortable, but you should not have sharp pains. If you do get sharp pains, stop what you are doing. If the sharp pains continue, call your doctor.  Drink plenty of fluids without caffeine when you exercise and afterwards. Avoid outdoor exercise if it is too cold or too hot.  Wear the right clothes and shoes. Try layers of clothes, so that you can take them off if you get too hot. Shoes should fit well and  support your feet.  Where can I learn more?   National [place] on Aging  https://www.jelly.nih.gov/health/publication/exercise-physical-activity/introduction   Last Reviewed Date   2021-03-18  Consumer Information Use and Disclaimer   This information is not specific medical advice and does not replace information you receive from your health care provider. This is only a brief summary of general information. It does NOT include all information about conditions, illnesses, injuries, tests, procedures, treatments, therapies, discharge instructions or life-style choices that may apply to you. You must talk with your health care provider for complete information about your health and treatment options. This information should not be used to decide whether or not to accept your health care providers advice, instructions or recommendations. Only your health care provider has the knowledge and training to provide advice that is right for you.  Copyright   Copyright © 2021 [employer] and its affiliates and/or licensors. All rights reserved.

## 2024-12-13 NOTE — PROGRESS NOTES
"   Internal Medicine    Riana Pastrana is a 69 y.o. female here today for a Medicare Annual Wellness visit and comprehensive Health Risk Assessment.     Subjective     Ms. Yoo is a 68-year-old female who is here today for a Medicare wellness visit..  Comorbidities include PAF, HTN, HLD and breast cancer status post bilateral mastectomies in April of 2022.  Followed by Oncology closely.   Labs are reviewed and noted to be essentially normal except for low vitamin-D level and impaired glucose regulation at hemoglobin A1c of 6.5.  Patient has been managing with diet and exercise, emphasized on importance of watching carbohydrates and sugary food intake.      No other acute needs or complaints.  Blood pressure is well controlled      MCW:  12/13/2024  Bilateral mastectomies    The following components were reviewed and updated:  Medical history  Family History  Social history  Allergies  Current Medications  Immunizations  Health Maintenance  Patient Care Team    Review of Systems  A comprehensive review of systems was conducted and is negative except as noted above.     Objective   Visit Vitals  /76 (BP Location: Right arm, Patient Position: Sitting)   Pulse 65   Ht 5' 4" (1.626 m)   Wt 97.5 kg (215 lb)   SpO2 96%   BMI 36.90 kg/m²       Physical Exam  Constitutional:       Appearance: Normal appearance. She is obese.   HENT:      Head: Normocephalic and atraumatic.      Nose: Nose normal.      Mouth/Throat:      Mouth: Mucous membranes are moist.      Pharynx: Oropharynx is clear.   Eyes:      Extraocular Movements: Extraocular movements intact.      Pupils: Pupils are equal, round, and reactive to light.   Cardiovascular:      Rate and Rhythm: Normal rate and regular rhythm.      Pulses: Normal pulses.   Pulmonary:      Effort: Pulmonary effort is normal.      Breath sounds: Normal breath sounds.   Abdominal:      General: Bowel sounds are normal.      Palpations: Abdomen is soft.   Musculoskeletal:   "       General: Normal range of motion.      Cervical back: Normal range of motion and neck supple.   Skin:     General: Skin is warm.   Neurological:      General: No focal deficit present.      Mental Status: She is alert and oriented to person, place, and time. Mental status is at baseline.   Psychiatric:         Mood and Affect: Mood normal.          Assessment/Plan:  1. Medicare annual wellness visit, subsequent  Assessment & Plan:       -labs are reviewed all essentially normal except impaired glucose for which hemoglobin A1c has been drawn today and I will follow-up on results and keep patient posted  -patient is advised on importance of watching her carbohydrate intake and saturated fat intake, making the right nutritional choices and exercising on a regular basis  -up-to-date with the screening          2. Impaired glucose regulation  -     Microalbumin/creatinine urine ratio; Future; Expected date: 12/13/2024    3. Need for shingles vaccine  -     varicella-zoster [id number], PF, (SHINGRIX) 50 mcg/0.5 mL injection; Inject 0.5 mLs into the muscle once. for 1 dose  Dispense: 1 each; Refill: 0    4. Need for influenza vaccination  -     influenza (adjuvanted) (Fluad) 45 mcg/0.5 mL IM vaccine (> or = 64 yo) 0.5 mL    5. Encounter for preventive health examination    6. Primary hypertension  Assessment & Plan:  Well controlled.   Continue Metoprolol 25 mg p.o. daily and hydrochlorothiazide 25 mg p.o. daily  -also on Lasix that does affect her blood pressure  Low Sodium Diet (DASH Diet - Less than 2 grams of sodium per day).  Monitor blood pressure daily and log. Report consistent numbers greater than 140/90.  Maintain healthy weight with goal BMI <30. Exercise 30 minutes per day, 5 days per week.         7. Paroxysmal atrial fibrillation  Assessment & Plan:  -on metoprolol, rate and rhythm controlled, continue         8. Age-related osteoporosis without current pathological fracture  Assessment & Plan:  -on  treatment with Prolia every 6 months, doing well   -weight-bearing exercises emphasized   -calcium plus vitamin-D on a regular basis         9. Acquired hypothyroidism  Assessment & Plan:  -TSH well controlled on current dose of levothyroxine at 50 mcg p.o. daily, continue      10. Severe obesity (BMI 35.0-39.9) with comorbidity      A comprehensive HEALTH RISK ASSESSMENT was completed today. Results are summarized below:    There are NO EMOTIONAL/SOCIAL CONCERNS identified on today's screening for Social Isolation, Depression and Anxiety.    There are NO COGNITIVE FUNCTION CONCERNS identified on today's screening.    There are NO FUNCTIONAL OR SAFETY CONCERNS were identified on today's screening for Physical Symptoms, Nutritional, Cognitive Function, Home Safety/Living Situation, Fall Risk, Activities of Daily Living, Independent Activities of Daily Living, Physical Activity, Timed Up and Go test and Whisper test.     The patient reports NO OPIOID PRESCRIPTIONS. This was confirmed through medication reconciliation and the [facility] website.    The patient is NOT A TOBACCO USER.        All Questions regarding food, transportation or housing were not answered today.      I provided Riana Foxcon with a 5-10 year written Screening Schedule per USPSTF age appropriate recommendations and a Personal Prevention Plan based on the results of today's Health Risk Assessment. Education, counseling, and referrals were provided as documented above and can be viewed in the After Visit Summary.    Follow up in about 6 months (around 6/13/2025) for BP Check. In addition to this scheduled follow up, the patient has also been instructed to follow up on as needed basis.     none  The patient was asked and declined the use of a free .  Advance Care Planning   Today we discussed advance care planning. She is interested in learning more about how to make Advance Directives. Information was provided and I offered to discuss  more at her discretion.     Advance Care Planning     Date: 12/13/2024  Patient did not wish or was not able to name a surrogate decision maker or provide an Advance Care Plan.

## 2024-12-16 DIAGNOSIS — N25.81 SECONDARY HYPERPARATHYROIDISM: ICD-10-CM

## 2024-12-16 DIAGNOSIS — R60.9 EDEMA, UNSPECIFIED TYPE: ICD-10-CM

## 2024-12-16 RX ORDER — LEVOTHYROXINE SODIUM 50 UG/1
50 TABLET ORAL
Qty: 90 TABLET | Refills: 3 | Status: SHIPPED | OUTPATIENT
Start: 2024-12-16

## 2024-12-16 RX ORDER — FUROSEMIDE 20 MG/1
20 TABLET ORAL
Qty: 90 TABLET | Refills: 3 | Status: SHIPPED | OUTPATIENT
Start: 2024-12-16

## 2025-02-12 DIAGNOSIS — E78.2 MIXED HYPERLIPIDEMIA: ICD-10-CM

## 2025-02-12 RX ORDER — ATORVASTATIN CALCIUM 20 MG/1
TABLET, FILM COATED ORAL
Qty: 90 TABLET | Refills: 3 | Status: SHIPPED | OUTPATIENT
Start: 2025-02-12

## 2025-02-24 NOTE — PROGRESS NOTES
[facility] Breast Surg  Breast Surgical Oncology  Follow-Up Patient Office Visit       Referring Provider: No ref. provider found   PCP: Chely Cameron MD   Care Team:   Medical Oncologist: Judi Richards MD   Radiation Oncologist: No care team member to display   OBGYN: No data on file.     Chief Complaint:   Chief Complaint   Patient presents with    Follow-up     Patient denies any new breast related concerns today       Subjective:     Treatment History:  Left Breast Cancer Dx in 2005:  1. Left Lumpectomy/SLNB  2. XRT  3. 5 years of Tamoxifen      Bilateral Breast Cancer Diagnosed in 2022  1. Bilateral Simple Mastectomy and Right [place] Lymph Node Biopsy 4/25/2022  2. Oncotype Dx RR 18, no overall benefit from chemotherapy  3. Endocrine therapy started 3/2022, expected for 5 years (Letrozole)  4. [facility] Genetic Testing 5/17/2022 - Negative     Interval History:   02/25/2025 - Riana Pastrana returns today for 1 yr follow up of breast cancer. She is doing well and has no breast/mastectomy concerns.  Continues Femara.     HPI:  Riana Pastrana initially presents on 03/29/22 at age 66 Years with a past history of left breast cancer diagnosed in 2005 SP lumpectomy/SLNB, XRT, and 5 years of Tamoxifen as well as recent mucinous cystosarcoma SP BSO diagnosed in 2017 who now presents with bilateral breast cancer. (1) AJCC 8th ed clinical anatomic stage IA / prognostic stage IA (cT1b cN0 M0) Right breast 3 o'clock subareolar invasive ductal carcinoma, grade 1, %,  %, HER2 0 - negative on IHC and Ki67 58%. (2) AJCC 8th ed clinical anatomic stage 0 / prognostic stage 0 (cTis cN0 M0) Left breast central posterior depth ductal carcinoma in situ, grade 1, % and %.     She is s/p bilateral simple mastectomy and right sentinel lymph node biopsy. Surgical pathology of the right breast revealed invasive ductal carcinoma grade 1 measuring 4 mm. Margins  negative. Four right sentinel lymph nodes are negative for malignancy (0/4). Surgical pathology of the left breast revealed central region/6:00 biopsy site with diminutive focus of residual low grade DCIS. Margins also negative.  She has started endocrine therapy and had genetic testing which was negative.    A detailed patient history was obtained and reviewed.     Imagin. 2022 BL SC MG at [facility]-which revealed on R MG lower inner quadrant anterior depth, there are two focal asymmetries. On L MG central region posterior depth there are indeterminate calcifications. At 12 o'clock left breast middle depth, there is a focal asymmetry with calcifications. There are stable post-therapy changes of the left breast. No detrimental change at the lumpectomy bed. There are stable post-core needle biopsy changes and a jesús shaped clip in the right breast. No detrimental change at this biopsy site. No other significant masses, calcifications, or other findings are seen in either breast. BIRADS-0 ; additional imaging needed.    2. 2022 BL DG MG/ BL US BREAST LIMITED at [facility]- which revealed on R MG at 4 o'clock subareolar anterior there is a 0.7 cm oval, circumscribed mass. On L MG 12 o'clock, anterior depth, there is a 1.2 cm focal asymmetry with associated heterogeneous calcifications. These findings correspond to the areas recalled on the screening examination dated 2022. No other suspicious masses, calcifications, or other signs of malignancy are identified. On R US, at 3 o'clock subareolar there is a 0.6 x 0.4 x 0.4 cm oval, hypoechoic mass with indistinct margins. At 4 o'clock subareolar right breast, there is a 0.7 x 0.3 x 0.6 cm benign, simple cyst. These correspond to the mammographic findings in these regions. No other suspicious sonographic abnormality is seen. Specifically, no suspicious sonographic correlate is seen for the focal asymmetry in the 12 o'clock left breast. Benign-appearing lymph nodes  are noted in the bilateral axillae. BIRADS-4 suspicious; biopsy recommended.     Pathology:  1. 3/16/2022 Ultrasound-guided Core Needle Biopsy Right Breast 3 o'clock Subareolar Mass - Invasive ductal carcinoma, grade 1  %  %  HER2 Negative  Ki67 - 58% High    2.3/16/2022 Stereotactic guided Core Needle Biopsy Left Breast Posterior Depth Amorphous Grouped Calcifications Central Region - Ductal carcinoma in situ, grade 1  %  OK 99%    3. 3/16/2022 Stereotactic guided Core Needle Biopsy Left Breast 12 o'clock Anterior Depth Focal Asymmetry with Heterogenous Calcifications - Sclerotic fibroadenomatoid nodule with dystropic calcification     4. 4/25/2022 Bilateral Simple Mastectomy and Right SLNB - Surgical pathology of the right breast revealed invasive ductal carcinoma grade 1 measuring 4 mm. Margins negative. Four right sentinel lymph nodes are negative for malignancy (0/4). Surgical pathology of the left breast revealed central region/6:00 biopsy site with diminutive focus of residual low grade DCIS. Margins also negative.      OB/GYN History:  Menarche Onset: 13  Menopause: Post, at age:  Hormonal birth control (duration): yes  Pregnancies: 3  Age at first pregnancy: ?  Child births: 2  Hysterectomy: yes, partial hysterectomy at age 29  Oophorectomy: yes, 2017 after diagnosis of adnexal mass suspicious - final pathology after BSO was mucinous cystosarcoma  HRT: no     Other:  # of breast biopsies (when and pathology results): 1 Left breast cancer in 2005  MG breast density: Category B (Scattered fibroglandular)  Prior thoracic RT: none  Genetic testing: none  Ashkenazi Shinto descent: No    Family History:  Family History   Problem Relation Name Age of Onset    Ovarian cancer Sister  50        Patient History:  Past Medical History:   Diagnosis Date    History of bilateral breast cancer     left in 2007; bilateral in 2022    Hyperlipidemia 5/13/2022    Hypertension     Hypothyroidism 5/13/2022     Severe obesity (BMI 35.0-39.9) with comorbidity 5/13/2022       Past Surgical History:   Procedure Laterality Date    BREAST BIOPSY      BREAST SURGERY      HYSTERECTOMY      TONSILLECTOMY         Social History     Socioeconomic History    Marital status:    Tobacco Use    Smoking status: Never    Smokeless tobacco: Never   Substance and Sexual Activity    Alcohol use: Never    Drug use: Never     Social Drivers of Health     Financial Resource Strain: Low Risk  (7/27/2023)    Overall Financial Resource Strain (CARDIA)     Difficulty of Paying Living Expenses: Not hard at all   Food Insecurity: No Food Insecurity (7/27/2023)    Hunger Vital Sign     Worried About Running Out of Food in the Last Year: Never true     Ran Out of Food in the Last Year: Never true   Transportation Needs: No Transportation Needs (7/27/2023)    PRAPARE - Transportation     Lack of Transportation (Medical): No     Lack of Transportation (Non-Medical): No   Physical Activity: Sufficiently Active (7/27/2023)    Exercise Vital Sign     Days of Exercise per Week: 7 days     Minutes of Exercise per Session: 60 min   Stress: No Stress Concern Present (7/27/2023)    [employer] - Occupational Stress Questionnaire     Feeling of Stress : Not at all   Housing Stability: Unknown (7/27/2023)    Housing Stability Vital Sign     Unable to Pay for Housing in the Last Year: No     Unstable Housing in the Last Year: No       Immunization History   Administered Date(s) Administered    COVID-19, MRNA, LN-S, PF (Pfizer) (Purple Cap) 02/25/2021, 03/19/2021, 11/01/2021    COVID-19, mRNA, LNP-S, PF (Moderna) Ages 12+ 10/25/2023    COVID-19, mRNA, LNP-S, bivalent booster, PF (PFIZER OMICRON) 10/26/2022    Influenza - Quadrivalent - High Dose - PF (65 years and older) 11/01/2021, 10/26/2022, 10/25/2023    Influenza - Quadrivalent - PF (6-35 months) 10/01/2018    Influenza - Quadrivalent - PF *Preferred* (6 months and older)  "11/07/2019    Influenza - Trivalent - Fluad - Adjuvanted - PF (65 years and older 12/13/2024    Pneumococcal Conjugate - 13 Valent 11/12/2021    Pneumococcal Polysaccharide - 23 Valent 11/23/2022    RSVpreF (Arexvy) 10/25/2023       Medications/Allergies:  Current Outpatient Medications on File Prior to Visit   Medication Sig Dispense Refill    atorvastatin (LIPITOR) 20 MG tablet TAKE 1 TABLET EVERY DAY 90 tablet 3    denosumab (PROLIA) 60 mg/mL Syrg Inject 60 mg into the skin every 6 (six) months.      ergocalciferol (ERGOCALCIFEROL) 50,000 unit Cap TAKE 1 CAPSULE TWICE WEEKLY 24 capsule 3    furosemide (LASIX) 20 MG tablet TAKE 1 TABLET EVERY DAY 90 tablet 3    hydroCHLOROthiazide (HYDRODIURIL) 25 MG tablet TAKE 1 TABLET EVERY DAY 90 tablet 3    letrozole (FEMARA) 2.5 mg Tab Take 1 tablet (2.5 mg total) by mouth once daily. 30 tablet 6    levothyroxine (SYNTHROID) 50 MCG tablet TAKE 1 TABLET BEFORE BREAKFAST 90 tablet 3    magnesium 200 mg Tab Take 1 tablet by mouth Daily.      metoprolol tartrate (LOPRESSOR) 25 MG tablet TAKE 1 TABLET TWICE DAILY 180 tablet 3    multivit-min/iron/folic/lutein (CENTRUM SILVER WOMEN ORAL) Take 1 tablet by mouth once daily.      potassium chloride (MICRO-K) 10 MEQ CpSR TAKE 2 CAPSULES ONE TIME DAILY 60 capsule 11     No current facility-administered medications on file prior to visit.       Review of patient's allergies indicates:   Allergen Reactions    Penicillins Other (See Comments)     UNKNOWN REACTION. ALLERGY SINCE CHILDHOOD         Review of Systems:  Pertinent items are noted in HPI.     Objective:     Vitals:  Vitals:    02/25/25 1041 02/25/25 1044   BP: (!) 143/80 (!) 147/68   BP Location: Right arm Left arm   Patient Position: Sitting Sitting   Pulse: 67    Resp: 18    Temp: 98.2 °F (36.8 °C)    TempSrc: Oral    SpO2: 95%    Weight: 97.7 kg (215 lb 6.4 oz)    Height: 5' 4" (1.626 m)      Body mass index is 36.97 kg/m².     Physical Exam:  General: The patient is awake, " "alert and oriented times three. The patient is well nourished and in no acute distress.  Neck: There is no evidence of palpable cervical, supraclavicular or axillary adenopathy. The neck is supple. The thyroid is not enlarged.  Musculoskeletal: The patient has a normal range of motion of her bilateral upper extremities.  Chest: Examination of the chest wall fails to reveal any obvious abnormalities. Nonlabored breathing, symmetric expansion.  Breast: Examination of the Mastectomy sites bilaterally fails to reveal any dominant masses or areas of significant focal nodularity. The nipples are surgically absent bilaterally. There are no significant skin changes overlying the breasts. There is no skin dimpling with movement of the pectoralis.  Abdomen: The abdomen is soft, flat, nontender and nondistended.  Integumentary: no rashes or skin lesions present  Neurologic: cranial nerves intact, no signs of peripheral neurological deficit, motor/sensory function intact      Assessment and Plan:       Riana Nolasco" was seen today for follow-up.    Diagnoses and all orders for this visit:    Malignant neoplasm of central portion of right breast in female, estrogen receptor positive    Ductal carcinoma in situ (DCIS) of left breast    History of bilateral mastectomy              Plan:       Healthy lifestyle guidelines were reviewed. She was encouraged to engage in regular exercise, maintain a healthy body weight, and avoid excessive alcohol consumption. Healthy nutritional guidelines were also discussed. Self-breast examination was reviewed with the patient in detail and she was encouraged to perform this on a monthly basis.    Continue endocrine therapy and follow up with medical oncology.    RTC in 1 year for CBE. No indication for mammograms in patients s/p bilateral mastectomy.    Other routine screening exams: next colonoscopy due 2028. S/p total hysterectomy therefore no indication for pap smears. She continues " regular follow up with her PCP.        All of her questions were answered. She was advised to call if she develops any questions or concerns.    Sirena Lee PA-C              Total time on the date of the visit ranged from 30-39 mins ([id number]). Total time includes both face-to-face and non-face-to-face time personally spent by myself on the day of the visit.    Non-face-to-face time included:  _X_ preparing to see the patient such as reviewing the patient record  __ obtaining and reviewing separately obtained history  _X_ independently interpreting results  _X_ documenting clinical information in electronic health record.    Face-to-face time included:  _X_ performing an appropriate history and examination  _X_ communicating results to the patient  _X_ counseling and educating the patient  __ ordering appropriate medications  _x_ ordering appropriate tests  _X_ ordering appropriate procedures (including follow-up)  _X_ answering any questions the patient had    Total Time spent on date of visit: 35 minutes

## 2025-02-25 ENCOUNTER — OFFICE VISIT (OUTPATIENT)
Dept: SURGERY | Facility: CLINIC | Age: 70
End: 2025-02-25
Payer: MEDICARE

## 2025-02-25 VITALS
BODY MASS INDEX: 36.77 KG/M2 | SYSTOLIC BLOOD PRESSURE: 147 MMHG | HEART RATE: 67 BPM | RESPIRATION RATE: 18 BRPM | HEIGHT: 64 IN | TEMPERATURE: 98 F | OXYGEN SATURATION: 95 % | WEIGHT: 215.38 LBS | DIASTOLIC BLOOD PRESSURE: 68 MMHG

## 2025-02-25 DIAGNOSIS — C50.111 MALIGNANT NEOPLASM OF CENTRAL PORTION OF RIGHT BREAST IN FEMALE, ESTROGEN RECEPTOR POSITIVE: Primary | ICD-10-CM

## 2025-02-25 DIAGNOSIS — Z90.13 HISTORY OF BILATERAL MASTECTOMY: ICD-10-CM

## 2025-02-25 DIAGNOSIS — D05.12 DUCTAL CARCINOMA IN SITU (DCIS) OF LEFT BREAST: ICD-10-CM

## 2025-02-25 DIAGNOSIS — Z17.0 MALIGNANT NEOPLASM OF CENTRAL PORTION OF RIGHT BREAST IN FEMALE, ESTROGEN RECEPTOR POSITIVE: Primary | ICD-10-CM

## 2025-02-25 PROCEDURE — 3008F BODY MASS INDEX DOCD: CPT | Mod: CPTII,S$GLB,, | Performed by: PHYSICIAN ASSISTANT

## 2025-02-25 PROCEDURE — 99999 PR PBB SHADOW E&M-EST. PATIENT-LVL V: CPT | Mod: PBBFAC,,, | Performed by: PHYSICIAN ASSISTANT

## 2025-02-25 PROCEDURE — 3288F FALL RISK ASSESSMENT DOCD: CPT | Mod: CPTII,S$GLB,, | Performed by: PHYSICIAN ASSISTANT

## 2025-02-25 PROCEDURE — 1126F AMNT PAIN NOTED NONE PRSNT: CPT | Mod: CPTII,S$GLB,, | Performed by: PHYSICIAN ASSISTANT

## 2025-02-25 PROCEDURE — 1159F MED LIST DOCD IN RCRD: CPT | Mod: CPTII,S$GLB,, | Performed by: PHYSICIAN ASSISTANT

## 2025-02-25 PROCEDURE — 1101F PT FALLS ASSESS-DOCD LE1/YR: CPT | Mod: CPTII,S$GLB,, | Performed by: PHYSICIAN ASSISTANT

## 2025-02-25 PROCEDURE — 99214 OFFICE O/P EST MOD 30 MIN: CPT | Mod: S$GLB,,, | Performed by: PHYSICIAN ASSISTANT

## 2025-02-25 PROCEDURE — 1160F RVW MEDS BY RX/DR IN RCRD: CPT | Mod: CPTII,S$GLB,, | Performed by: PHYSICIAN ASSISTANT

## 2025-02-25 PROCEDURE — 3078F DIAST BP <80 MM HG: CPT | Mod: CPTII,S$GLB,, | Performed by: PHYSICIAN ASSISTANT

## 2025-02-25 PROCEDURE — 3077F SYST BP >= 140 MM HG: CPT | Mod: CPTII,S$GLB,, | Performed by: PHYSICIAN ASSISTANT

## 2025-04-23 NOTE — PROGRESS NOTES
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 2022 BL SC MG at [facility]-which revealed on R MG lower inner quadrant anterior depth, there are two focal asymmetries. On L MG central region posterior depth there are indeterminate calcifications. At 12 o'clock left breast middle depth, there is a focal asymmetry with calcifications. There are stable post-therapy changes of the left breast. No detrimental change at the lumpectomy bed. There are stable post-core needle biopsy changes and a jesús shaped clip in the right breast. No detrimental change at this biopsy site. No other significant masses, calcifications, or other findings are seen in either breast. BIRADS-0 ; additional imaging needed.    2. 2022 BL DG MG/ BL US BREAST LIMITED at [facility]- which revealed on R MG at 4 o'clock subareolar anterior there is a 0.7 cm oval, circumscribed mass. On L MG 12 o'clock, anterior depth, there is a 1.2 cm focal asymmetry with associated heterogeneous calcifications. These findings correspond to the areas recalled on the screening examination dated 2022. No other suspicious masses, calcifications, or other signs of  malignancy are identified. On R US, at 3 o'clock subareolar there is a 0.6 x 0.4 x 0.4 cm oval, hypoechoic mass with indistinct margins. At 4 o'clock subareolar right breast, there is a 0.7 x 0.3 x 0.6 cm benign, simple cyst. These correspond to the mammographic findings in these regions. No other suspicious sonographic abnormality is seen. Specifically, no suspicious sonographic correlate is seen for the focal asymmetry in the 12 o'clock left breast. Benign-appearing lymph nodes are noted in the bilateral axillae. BIRADS-4 suspicious; biopsy recommended.    Bone density 9/21/22:   LUMBAR SPINE The L1 to L3 vertebral bone mineral density is equal to 1.121 g/cm squared with a T score of -0.5.  LEFT HIP The left femoral neck bone mineral density is equal to 0.744 g/cm squared with a T score of -2.1.   The left total hip bone mineral density is equal to 0.816 g/cm squared with a T score of -1.5.   RIGHT HIP The right femoral neck bone mineral density is equal to 0.694 g/cm squared with a T score of -2.5.  The right total hip bone mineral density is equal to 0.824 g/cm squared with a T score of -1.5.  FRAX 10-YEAR PROBABILITY OF FRACTURE: 21.4% risk of a major osteoporotic fracture and 2.9% risk of hip fracture in the next 10 years    Impression: Osteoporosis      Pathology:  3/16/2022:   [1] LEFT BREAST POSTERIOR DEPTH AMORPHOUS GROUPED CALCIFICATIONS CENTRAL REGION: DUCTAL CARCINOMA IN SITU, LOW GRADE CRIBRIFORM/MICROPAPILLARY TYPE, WITH MICROCALCIFICATIONS. DCIS is present on two core biopsies, the largest focus measuring less than 3 mm.   [2] LEFT BREAST 12 O' CLOCK ANTERIOR DEPTH FOCAL ASYMMETRY WITH HETEROGENOUS CALCIFICATIONS:SCLEROTIC FIBROADENOMATOID NODULE WITH DYSTROPIC CALCIFICATION.   [3] RIGHT BREAST 3 O' CLOCK SUBAREOLAR MASS: INVASIVE DUCTAL CARCINOMA, SOTO-ROMERO GRADE 1.  Carcinoma is present on two core biopsies and measures 5 mm.    %, %, Her2 neg, Ki67 58%.    4/25/2022:  [1]   BREAST, LEFT, SIMPLE MASTECTOMY: DIMINUTIVE FOCUS OF RESIDUAL LOW GRADE DUCTAL CARCINOMA IN SITU.  [2]  BREAST, RIGHT, SIMPLE MASTECTOMY: INVASIVE DUCTAL CARCINOMA, LOW GRADE, GRADE 1(OF 3).  -Tumor size:  4.0 mm. pT Category:  pT1a  pN0  BREAST, RIGHT, AXILLARY SENTINEL NODE #4/4: NEGATIVE FOR METASTATIC CARCINOMA.  The patient's previous history of low grade invasive ductal carcinoma is noted from surgical pathology report [id number] ER (100%), IL (100%), HER2 negative, Ki-67 high (58%).         CLINICAL HISTORY:       Patient: Riana Pastrana is a 69 y.o. female kindly referred for history of breast cancer.  I do not have record of her diagnosis and treatment of her original breast cancer.    Patient states that she was diagnosed with breast cancer in 2005.  She had Left lumpectomy done, with no chemotherapy or radiation. She took tamoxifen for 5 years. She reports that she was treated by Dr. Robbin Kenny. She says that she was seen at [facility], but since her insurance has changed she would like to establish care here.     Patient reports menarche at 13. She had 3 pregnancies, 2 live children, 1 miscarriage. Her first child was at age 20.She had a partial hysterectomy at age 29. She admits to oral contraceptives for about 3 years. No hormone replacement therapy.     Her sister had ovarian cancer diagnosed in her 50's, currently still alive at age 80.    She reports that she had a mass in her stomach and it was removed in 2017 along with her ovaries at [facility].  Again I do not have the records, but imaging studies none here include CT scan of the abdomen and pelvis done on 5/21/2017 for an abdominal distention showed a 22 x 21 x 16 cm complex septated peripherally enhancing mass which appears to arise from the right adnexa, presumably ovarian in origin.  Uterus not identified.  Cystic mass in the abdomen and pelvis displaces the urinary bladder inferiorly.  Liver is is heterogeneous in  attenuation and contains 2 low-attenuation masses in the dome, too small to accurately characterize by CT.  Spleen normal in size. CT of the chest showed cardiomegaly with moderate pericardial effusion.  Bilateral lower lobe consolidation with atelectasis and bilateral pleural effusions, left greater than right. Large abdominal cystic mass of unknown etiology. Further assessment with dedicated imaging of the abdomen and pelvis recommended. US pelvis 5/22/2021:  A large, multilocular mass consistent with the CT findings is identified measuring 27.5 x 20.1 x 19.6 cm. There are multiple septations or separate fluid loculations in the large cystic mass. Low level internal echogenicity is also visible in the cyst fluid with dependent debris also identified. No free pelvic fluid is visible. This lesion is suspicious for neoplasm of pelvic and likely ovarian origin. The uterus is not identified and surgically absent by patient history. 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  Ascites  suggestive of peritoneal implants,  the ascites is new from the previous study. No Path available    On 2/7/2022 screening mammogram: INCOMPLETE: NEEDS ADDITIONAL IMAGING EVALUATION  Right breast focal asymmetries, left breast calcifications, and left breast focal asymmetry with calcifications need further evaluation. BI-RADS 0: Incomplete. Need additional imaging evaluation.     On 2/24/2022 Diagnostic right mammogram and Breast US: SUSPICIOUS OF MALIGNANCY  1. Oval, hypoechoic mass with indistinct margins in the 3:00 subareolar right breast is suspicious. Right breast ultrasound-guided biopsy is recommended.  2. Amorphous, grouped calcifications in the central left breast, posterior depth, are suspicious. Left breast stereotactic guided biopsy is recommended.  3. Focal asymmetry with associated heterogeneous calcifications in the 12:00 left breast, anterior depth, is suspicious. Left  breast stereotactic guided biopsy is recommended.     On 3/16/2022 she is schedule for breast bx. otherwise she is doing well and voices no concerns.  No fever, chills, sweats.  No chest pain or shortness of breath.  No changes in bowel habits.  No abdominal or pelvic pain.  No neurological symptoms.    [1] LEFT BREAST POSTERIOR DEPTH AMORPHOUS GROUPED CALCIFICATIONS CENTRAL REGION:  DUCTAL CARCINOMA IN SITU, LOW GRADE CRIBRIFORM/MICROPAPILLARY TYPE, WITH MICROCALCIFICATIONS.  COMMENT: DCIS is present on two core biopsies, the largest focus measuring less than 3 mm. The results of ER/HI analysis will be the subject of an addendum report.  [2] LEFT BREAST 12 O' CLOCK ANTERIOR DEPTH FOCAL ASYMMETRY WITH HETEROGENOUS CALCIFICATIONS:  SCLEROTIC FIBROADENOMATOID NODULE WITH DYSTROPIC CALCIFICATION.  [3] RIGHT BREAST 3 O' CLOCK SUBAREOLAR MASS:  INVASIVE DUCTAL CARCINOMA, SOTO-ROMERO GRADE 1.  COMMENT: Carcinoma is present on two core biopsies and measures 5 mm.    %, %, Her2 neg, Ki67 58%.      Oncotype showed a recurrence score of 18 with a distant recurrent risk at 9 years of 5% with AI or tamoxifen alone and <1% absolute chemotherapy benefit.     Chief Complaint: i feel fine today          Interval History:  Patient presents today for follow up in surveillance of her breast cancer. She is taking Letrozole and tolerating well (started March 2022). She is also taking Prolia, due today. She denies any fever, chills, sweats. No chest pain or shortness of breath.  No changes in bowel habits.  She had a bone density test on 9/23/24 that showed improvement since 9/2022.     Wt Readings from Last 7 Encounters:   04/24/25 98.4 kg (217 lb)   02/25/25 97.7 kg (215 lb 6.4 oz)   12/13/24 97.5 kg (215 lb)   10/18/24 97.3 kg (214 lb 8 oz)   10/03/24 96.3 kg (212 lb 4.8 oz)   08/23/24 96.2 kg (212 lb)   06/05/24 97.5 kg (215 lb)   ]      Past Medical History:   Diagnosis Date    History of bilateral breast cancer      left in 2007; bilateral in 2022    Hyperlipidemia 5/13/2022    Hypertension     Hypothyroidism 5/13/2022    Severe obesity (BMI 35.0-39.9) with comorbidity 5/13/2022      Past Surgical History:   Procedure Laterality Date    BREAST BIOPSY      BREAST SURGERY      HYSTERECTOMY      TONSILLECTOMY       Family History   Problem Relation Name Age of Onset    Ovarian cancer Sister  50            Review of patient's allergies indicates:   Allergen Reactions    Penicillins Other (See Comments)     UNKNOWN REACTION. ALLERGY SINCE CHILDHOOD        Current Outpatient Medications on File Prior to Visit   Medication Sig Dispense Refill    atorvastatin (LIPITOR) 20 MG tablet TAKE 1 TABLET EVERY DAY 90 tablet 3    denosumab (PROLIA) 60 mg/mL Syrg Inject 60 mg into the skin every 6 (six) months.      ergocalciferol (ERGOCALCIFEROL) 50,000 unit Cap TAKE 1 CAPSULE TWICE WEEKLY 24 capsule 3    furosemide (LASIX) 20 MG tablet TAKE 1 TABLET EVERY DAY 90 tablet 3    hydroCHLOROthiazide (HYDRODIURIL) 25 MG tablet TAKE 1 TABLET EVERY DAY 90 tablet 3    letrozole (FEMARA) 2.5 mg Tab Take 1 tablet (2.5 mg total) by mouth once daily. 30 tablet 6    levothyroxine (SYNTHROID) 50 MCG tablet TAKE 1 TABLET BEFORE BREAKFAST 90 tablet 3    magnesium 200 mg Tab Take 1 tablet by mouth Daily.      metoprolol tartrate (LOPRESSOR) 25 MG tablet TAKE 1 TABLET TWICE DAILY 180 tablet 3    multivit-min/iron/folic/lutein (CENTRUM SILVER WOMEN ORAL) Take 1 tablet by mouth once daily.      potassium chloride (MICRO-K) 10 MEQ CpSR TAKE 2 CAPSULES ONE TIME DAILY 60 capsule 11     No current facility-administered medications on file prior to visit.      Review of Systems   Constitutional:  Negative for activity change, appetite change, chills, fatigue, fever and unexpected weight change.   HENT:  Negative for mouth dryness, mouth sores, nosebleeds, sore throat and trouble swallowing.    Eyes:  Negative for visual disturbance.   Respiratory:  Negative for cough,  "chest tightness and shortness of breath.    Cardiovascular:  Negative for chest pain, palpitations and leg swelling.   Gastrointestinal:  Negative for abdominal distention, abdominal pain, blood in stool, change in bowel habit, constipation, diarrhea, nausea and vomiting.   Endocrine: Negative.    Genitourinary:  Negative for dysuria, frequency, hematuria and urgency.   Musculoskeletal:  Positive for arthralgias. Negative for back pain, myalgias and neck pain.   Integumentary:  Negative for rash.   Neurological:  Negative for dizziness, tremors, syncope, speech difficulty, weakness, light-headedness, numbness, headaches and memory loss.   Hematological:  Does not bruise/bleed easily.   Psychiatric/Behavioral:  Negative for confusion and suicidal ideas.               Vitals:    04/24/25 1403   BP: 120/74   Patient Position: Sitting   Pulse: 67   Resp: 15   Temp: 97.8 °F (36.6 °C)   SpO2: 98%   Weight: 98.4 kg (217 lb)   Height: 5' 4" (1.626 m)                  Physical Exam  Vitals and nursing note reviewed.   Constitutional:       General: She is not in acute distress.     Appearance: Normal appearance. She is well-developed. She is obese.   HENT:      Head: Normocephalic and atraumatic.      Mouth/Throat:      Mouth: Mucous membranes are moist.   Eyes:      General: No scleral icterus.     Extraocular Movements: Extraocular movements intact.      Conjunctiva/sclera: Conjunctivae normal.      Pupils: Pupils are equal, round, and reactive to light.   Neck:      Vascular: No JVD.   Cardiovascular:      Rate and Rhythm: Normal rate. Rhythm irregularly irregular.      Heart sounds: No murmur heard.  Pulmonary:      Effort: Pulmonary effort is normal.      Breath sounds: Normal breath sounds. No wheezing or rhonchi.   Chest:      Chest wall: No deformity or tenderness.   Breasts:     Right: Absent. No swelling, mass, nipple discharge, skin change or tenderness.      Left: Absent. No swelling, mass, nipple discharge, skin " change or tenderness.   Abdominal:      General: Bowel sounds are normal. There is no distension.      Palpations: Abdomen is soft. There is no mass.      Tenderness: There is no abdominal tenderness.   Musculoskeletal:         General: No swelling or deformity.      Cervical back: Neck supple.   Lymphadenopathy:      Head:      Right side of head: No submandibular adenopathy.      Left side of head: No submandibular adenopathy.      Cervical: No cervical adenopathy.      Upper Body:      Right upper body: No supraclavicular or axillary adenopathy.      Left upper body: No supraclavicular or axillary adenopathy.      Lower Body: No right inguinal adenopathy. No left inguinal adenopathy.   Skin:     General: Skin is warm.      Coloration: Skin is not jaundiced.      Findings: No lesion or rash.      Nails: There is no clubbing.   Neurological:      General: No focal deficit present.      Mental Status: She is alert and oriented to person, place, and time.      Cranial Nerves: Cranial nerves 2-12 are intact.      Sensory: Sensation is intact.      Motor: Motor function is intact.      Gait: Gait is intact.   Psychiatric:         Attention and Perception: Attention normal.         Mood and Affect: Mood and affect normal.         Speech: Speech normal.         Behavior: Behavior is cooperative.         Thought Content: Thought content normal.         Cognition and Memory: Cognition normal.         Judgment: Judgment normal.       ECOG SCORE             Laboratory:   Latest Reference Range & Units 05/10/22 10:54   WBC 4.5 - 11.5 x10(3)/mcL 6.0   RBC 4.20 - 5.40 x10(6)/mcL 4.08 (L)   Hemoglobin 12.0 - 16.0 gm/dL 12.6   Hematocrit 37.0 - 47.0 % 38.2   MCV 80.0 - 94.0 fL 93.6   MCH 27.0 - 31.0 pg 30.9   MCHC 33.0 - 36.0 mg/dL 33.0   RDW 11.5 - 17.0 % 12.5   Platelets 130 - 400 x10(3)/mcL 255   (L): Data is abnormally low       Assessment:       1) Left breast cancer-diagnosed 2005   --lumpectomy/SLNB, XRT, and 5 years of  Tamoxifen   2) Mucinous cystosarcoma-Dx 2017    --SP BSO   3) Bilateral breast cancer   --DCIS-clinical anatomic stage IA / prognostic stage IA (cT1b cN0 M0) left breast Dx 3/16/2022   --Clinical anatomic stage IA / prognostic stage IA (cT1b cN0 M0) Right breast    --%, %, Her 2 neg, Ki 67 58%, Grade 1   --Bilateral mastectomies 4/25/2022, Right SLND      Plan:       Started Femara 3/2022. Prolia started in 10/2022.  Baseline DEXA scan with osteoporosis --right femoral neck bone mineral density with a T score of -2.5. Otherwise osteopenia. She was started on Prolia and recently bone density on 9/23/24 showed improvement.    Continue Femara  Continue Prolia - next due 4/2025  Bone density - due 9/2026  Continue Calcium and Vit D supplements and weight bearing exercises  No breast imaging as she has bilateral mastectomies  RTC in 6 months with labs: CBC, CMP, CA 27-29    Encouraged to call with questions or problems  The patient was given ample opportunity to ask questions and they were all answered to satisfaction; patient demonstrated understanding of what we discussed and is agreeable to the plan.

## 2025-04-24 ENCOUNTER — INFUSION (OUTPATIENT)
Dept: INFUSION THERAPY | Facility: HOSPITAL | Age: 70
End: 2025-04-24
Attending: INTERNAL MEDICINE
Payer: MEDICARE

## 2025-04-24 ENCOUNTER — LAB VISIT (OUTPATIENT)
Dept: LAB | Facility: HOSPITAL | Age: 70
End: 2025-04-24
Attending: NURSE PRACTITIONER
Payer: MEDICARE

## 2025-04-24 ENCOUNTER — OFFICE VISIT (OUTPATIENT)
Dept: HEMATOLOGY/ONCOLOGY | Facility: CLINIC | Age: 70
End: 2025-04-24
Payer: MEDICARE

## 2025-04-24 VITALS
OXYGEN SATURATION: 98 % | HEART RATE: 67 BPM | TEMPERATURE: 98 F | RESPIRATION RATE: 15 BRPM | DIASTOLIC BLOOD PRESSURE: 74 MMHG | HEIGHT: 64 IN | SYSTOLIC BLOOD PRESSURE: 120 MMHG | BODY MASS INDEX: 37.05 KG/M2 | WEIGHT: 217 LBS

## 2025-04-24 DIAGNOSIS — Z17.0 MALIGNANT NEOPLASM OF UPPER-INNER QUADRANT OF RIGHT BREAST IN FEMALE, ESTROGEN RECEPTOR POSITIVE: ICD-10-CM

## 2025-04-24 DIAGNOSIS — E66.01 SEVERE OBESITY (BMI 35.0-39.9) WITH COMORBIDITY: ICD-10-CM

## 2025-04-24 DIAGNOSIS — M81.0 AGE-RELATED OSTEOPOROSIS WITHOUT CURRENT PATHOLOGICAL FRACTURE: Chronic | ICD-10-CM

## 2025-04-24 DIAGNOSIS — C50.211 MALIGNANT NEOPLASM OF UPPER-INNER QUADRANT OF RIGHT BREAST IN FEMALE, ESTROGEN RECEPTOR POSITIVE: Primary | ICD-10-CM

## 2025-04-24 DIAGNOSIS — Z17.0 MALIGNANT NEOPLASM OF UPPER-INNER QUADRANT OF RIGHT BREAST IN FEMALE, ESTROGEN RECEPTOR POSITIVE: Primary | ICD-10-CM

## 2025-04-24 DIAGNOSIS — Z51.81 THERAPEUTIC DRUG MONITORING: ICD-10-CM

## 2025-04-24 DIAGNOSIS — Z90.13 HISTORY OF BILATERAL MASTECTOMY: ICD-10-CM

## 2025-04-24 DIAGNOSIS — Z79.811 USE OF LETROZOLE (FEMARA): ICD-10-CM

## 2025-04-24 DIAGNOSIS — C50.211 MALIGNANT NEOPLASM OF UPPER-INNER QUADRANT OF RIGHT BREAST IN FEMALE, ESTROGEN RECEPTOR POSITIVE: ICD-10-CM

## 2025-04-24 DIAGNOSIS — M81.0 OSTEOPOROSIS, UNSPECIFIED OSTEOPOROSIS TYPE, UNSPECIFIED PATHOLOGICAL FRACTURE PRESENCE: ICD-10-CM

## 2025-04-24 LAB
ALBUMIN SERPL-MCNC: 3.9 G/DL (ref 3.4–4.8)
ALBUMIN/GLOB SERPL: 1 RATIO (ref 1.1–2)
ALP SERPL-CCNC: 118 UNIT/L (ref 40–150)
ALT SERPL-CCNC: 52 UNIT/L (ref 0–55)
ANION GAP SERPL CALC-SCNC: 9 MEQ/L
AST SERPL-CCNC: 78 UNIT/L (ref 11–45)
BASOPHILS # BLD AUTO: 0.06 X10(3)/MCL
BASOPHILS NFR BLD AUTO: 0.8 %
BILIRUB SERPL-MCNC: 0.7 MG/DL
BUN SERPL-MCNC: 14.5 MG/DL (ref 9.8–20.1)
CALCIUM SERPL-MCNC: 10.6 MG/DL (ref 8.4–10.2)
CHLORIDE SERPL-SCNC: 102 MMOL/L (ref 98–107)
CO2 SERPL-SCNC: 30 MMOL/L (ref 23–31)
CREAT SERPL-MCNC: 0.77 MG/DL (ref 0.55–1.02)
CREAT/UREA NIT SERPL: 19
EOSINOPHIL # BLD AUTO: 0.17 X10(3)/MCL (ref 0–0.9)
EOSINOPHIL NFR BLD AUTO: 2.4 %
ERYTHROCYTE [DISTWIDTH] IN BLOOD BY AUTOMATED COUNT: 12.3 % (ref 11.5–17)
GFR SERPLBLD CREATININE-BSD FMLA CKD-EPI: >60 ML/MIN/1.73/M2
GLOBULIN SER-MCNC: 4 GM/DL (ref 2.4–3.5)
GLUCOSE SERPL-MCNC: 87 MG/DL (ref 82–115)
HCT VFR BLD AUTO: 41.8 % (ref 37–47)
HGB BLD-MCNC: 13.8 G/DL (ref 12–16)
IMM GRANULOCYTES # BLD AUTO: 0.01 X10(3)/MCL (ref 0–0.04)
IMM GRANULOCYTES NFR BLD AUTO: 0.1 %
LYMPHOCYTES # BLD AUTO: 2.1 X10(3)/MCL (ref 0.6–4.6)
LYMPHOCYTES NFR BLD AUTO: 29.6 %
MCH RBC QN AUTO: 31.6 PG (ref 27–31)
MCHC RBC AUTO-ENTMCNC: 33 G/DL (ref 33–36)
MCV RBC AUTO: 95.7 FL (ref 80–94)
MONOCYTES # BLD AUTO: 0.6 X10(3)/MCL (ref 0.1–1.3)
MONOCYTES NFR BLD AUTO: 8.5 %
NEUTROPHILS # BLD AUTO: 4.16 X10(3)/MCL (ref 2.1–9.2)
NEUTROPHILS NFR BLD AUTO: 58.6 %
PLATELET # BLD AUTO: 203 X10(3)/MCL (ref 130–400)
PMV BLD AUTO: 10.9 FL (ref 7.4–10.4)
POTASSIUM SERPL-SCNC: 3.7 MMOL/L (ref 3.5–5.1)
PROT SERPL-MCNC: 7.9 GM/DL (ref 5.8–7.6)
RBC # BLD AUTO: 4.37 X10(6)/MCL (ref 4.2–5.4)
SODIUM SERPL-SCNC: 141 MMOL/L (ref 136–145)
WBC # BLD AUTO: 7.1 X10(3)/MCL (ref 4.5–11.5)

## 2025-04-24 PROCEDURE — 85025 COMPLETE CBC W/AUTO DIFF WBC: CPT

## 2025-04-24 PROCEDURE — 80053 COMPREHEN METABOLIC PANEL: CPT

## 2025-04-24 PROCEDURE — 96372 THER/PROPH/DIAG INJ SC/IM: CPT

## 2025-04-24 PROCEDURE — 63600175 PHARM REV CODE 636 W HCPCS: Mod: JZ,TB | Performed by: NURSE PRACTITIONER

## 2025-04-24 PROCEDURE — 99999 PR PBB SHADOW E&M-EST. PATIENT-LVL IV: CPT | Mod: PBBFAC,,, | Performed by: NURSE PRACTITIONER

## 2025-04-24 PROCEDURE — 86300 IMMUNOASSAY TUMOR CA 15-3: CPT

## 2025-04-24 PROCEDURE — 36415 COLL VENOUS BLD VENIPUNCTURE: CPT

## 2025-04-24 RX ADMIN — DENOSUMAB 60 MG: 60 INJECTION SUBCUTANEOUS at 03:04

## 2025-04-28 LAB — CANCER AG27-29 SERPL-ACNC: 29 U/ML

## 2025-05-12 DIAGNOSIS — C50.211 MALIGNANT NEOPLASM OF UPPER-INNER QUADRANT OF RIGHT BREAST IN FEMALE, ESTROGEN RECEPTOR POSITIVE: Primary | ICD-10-CM

## 2025-05-12 DIAGNOSIS — Z17.0 MALIGNANT NEOPLASM OF UPPER-INNER QUADRANT OF RIGHT BREAST IN FEMALE, ESTROGEN RECEPTOR POSITIVE: Primary | ICD-10-CM

## 2025-05-12 RX ORDER — LETROZOLE 2.5 MG/1
2.5 TABLET, FILM COATED ORAL DAILY
Qty: 30 TABLET | Refills: 5 | Status: SHIPPED | OUTPATIENT
Start: 2025-05-12 | End: 2025-11-08

## 2025-06-09 DIAGNOSIS — E03.9 ACQUIRED HYPOTHYROIDISM: ICD-10-CM

## 2025-06-09 DIAGNOSIS — R73.09 IMPAIRED GLUCOSE REGULATION: Primary | ICD-10-CM

## 2025-06-09 DIAGNOSIS — I10 PRIMARY HYPERTENSION: ICD-10-CM

## 2025-06-09 DIAGNOSIS — I48.0 PAROXYSMAL ATRIAL FIBRILLATION: ICD-10-CM

## 2025-06-10 ENCOUNTER — TELEPHONE (OUTPATIENT)
Dept: INTERNAL MEDICINE | Facility: CLINIC | Age: 70
End: 2025-06-10
Payer: MEDICARE

## 2025-06-10 NOTE — TELEPHONE ENCOUNTER
----- Message from Med Assistant Johnson sent at 6/9/2025  4:52 PM CDT -----  Regarding: PV Tuesday 6-17-25       1. Are there any outstanding Labs, imaging or referrals in the patient's chart?  6 month f/uFasting labs orderedLast wellness 12-13-24     2. . Has the patient been seen in an ER, urgent care clinic, or any other health care    provider since their last visit? If yes when and where?

## 2025-06-12 ENCOUNTER — LAB VISIT (OUTPATIENT)
Dept: LAB | Facility: HOSPITAL | Age: 70
End: 2025-06-12
Attending: INTERNAL MEDICINE
Payer: MEDICARE

## 2025-06-12 DIAGNOSIS — E03.9 ACQUIRED HYPOTHYROIDISM: ICD-10-CM

## 2025-06-12 DIAGNOSIS — I10 PRIMARY HYPERTENSION: ICD-10-CM

## 2025-06-12 DIAGNOSIS — I48.0 PAROXYSMAL ATRIAL FIBRILLATION: ICD-10-CM

## 2025-06-12 DIAGNOSIS — R73.09 IMPAIRED GLUCOSE REGULATION: ICD-10-CM

## 2025-06-12 LAB
ALBUMIN SERPL-MCNC: 3.7 G/DL (ref 3.4–4.8)
ALBUMIN/GLOB SERPL: 0.9 RATIO (ref 1.1–2)
ALP SERPL-CCNC: 121 UNIT/L (ref 40–150)
ALT SERPL-CCNC: 47 UNIT/L (ref 0–55)
ANION GAP SERPL CALC-SCNC: 9 MEQ/L
AST SERPL-CCNC: 60 UNIT/L (ref 11–45)
BASOPHILS # BLD AUTO: 0.08 X10(3)/MCL
BASOPHILS NFR BLD AUTO: 1.3 %
BILIRUB SERPL-MCNC: 0.7 MG/DL
BUN SERPL-MCNC: 16 MG/DL (ref 9.8–20.1)
CALCIUM SERPL-MCNC: 9.8 MG/DL (ref 8.4–10.2)
CHLORIDE SERPL-SCNC: 102 MMOL/L (ref 98–107)
CO2 SERPL-SCNC: 31 MMOL/L (ref 23–31)
CREAT SERPL-MCNC: 0.79 MG/DL (ref 0.55–1.02)
CREAT/UREA NIT SERPL: 20
EOSINOPHIL # BLD AUTO: 0.14 X10(3)/MCL (ref 0–0.9)
EOSINOPHIL NFR BLD AUTO: 2.2 %
ERYTHROCYTE [DISTWIDTH] IN BLOOD BY AUTOMATED COUNT: 12.6 % (ref 11.5–17)
EST. AVERAGE GLUCOSE BLD GHB EST-MCNC: 148.5 MG/DL
GFR SERPLBLD CREATININE-BSD FMLA CKD-EPI: >60 ML/MIN/1.73/M2
GLOBULIN SER-MCNC: 4.3 GM/DL (ref 2.4–3.5)
GLUCOSE SERPL-MCNC: 124 MG/DL (ref 82–115)
HBA1C MFR BLD: 6.8 %
HCT VFR BLD AUTO: 43.2 % (ref 37–47)
HGB BLD-MCNC: 13.8 G/DL (ref 12–16)
IMM GRANULOCYTES # BLD AUTO: 0.02 X10(3)/MCL (ref 0–0.04)
IMM GRANULOCYTES NFR BLD AUTO: 0.3 %
LYMPHOCYTES # BLD AUTO: 1.89 X10(3)/MCL (ref 0.6–4.6)
LYMPHOCYTES NFR BLD AUTO: 29.8 %
MCH RBC QN AUTO: 30.6 PG (ref 27–31)
MCHC RBC AUTO-ENTMCNC: 31.9 G/DL (ref 33–36)
MCV RBC AUTO: 95.8 FL (ref 80–94)
MONOCYTES # BLD AUTO: 0.62 X10(3)/MCL (ref 0.1–1.3)
MONOCYTES NFR BLD AUTO: 9.8 %
NEUTROPHILS # BLD AUTO: 3.6 X10(3)/MCL (ref 2.1–9.2)
NEUTROPHILS NFR BLD AUTO: 56.6 %
NRBC BLD AUTO-RTO: 0 %
PLATELET # BLD AUTO: 191 X10(3)/MCL (ref 130–400)
PMV BLD AUTO: 10.9 FL (ref 7.4–10.4)
POTASSIUM SERPL-SCNC: 3.8 MMOL/L (ref 3.5–5.1)
PROT SERPL-MCNC: 8 GM/DL (ref 5.8–7.6)
RBC # BLD AUTO: 4.51 X10(6)/MCL (ref 4.2–5.4)
SODIUM SERPL-SCNC: 142 MMOL/L (ref 136–145)
TSH SERPL-ACNC: 1.42 UIU/ML (ref 0.35–4.94)
WBC # BLD AUTO: 6.35 X10(3)/MCL (ref 4.5–11.5)

## 2025-06-12 PROCEDURE — 85025 COMPLETE CBC W/AUTO DIFF WBC: CPT

## 2025-06-12 PROCEDURE — 36415 COLL VENOUS BLD VENIPUNCTURE: CPT

## 2025-06-12 PROCEDURE — 80053 COMPREHEN METABOLIC PANEL: CPT

## 2025-06-12 PROCEDURE — 83036 HEMOGLOBIN GLYCOSYLATED A1C: CPT

## 2025-06-12 PROCEDURE — 84443 ASSAY THYROID STIM HORMONE: CPT

## 2025-06-17 ENCOUNTER — OFFICE VISIT (OUTPATIENT)
Dept: INTERNAL MEDICINE | Facility: CLINIC | Age: 70
End: 2025-06-17
Payer: MEDICARE

## 2025-06-17 VITALS
HEIGHT: 64 IN | SYSTOLIC BLOOD PRESSURE: 132 MMHG | HEART RATE: 72 BPM | OXYGEN SATURATION: 96 % | DIASTOLIC BLOOD PRESSURE: 74 MMHG | WEIGHT: 214 LBS | BODY MASS INDEX: 36.54 KG/M2

## 2025-06-17 DIAGNOSIS — R73.09 ELEVATED HEMOGLOBIN A1C MEASUREMENT: ICD-10-CM

## 2025-06-17 DIAGNOSIS — Z90.13 HISTORY OF BILATERAL MASTECTOMY: ICD-10-CM

## 2025-06-17 DIAGNOSIS — E66.01 CLASS 2 SEVERE OBESITY DUE TO EXCESS CALORIES WITH SERIOUS COMORBIDITY AND BODY MASS INDEX (BMI) OF 36.0 TO 36.9 IN ADULT: ICD-10-CM

## 2025-06-17 DIAGNOSIS — I10 PRIMARY HYPERTENSION: Chronic | ICD-10-CM

## 2025-06-17 DIAGNOSIS — Z79.811 USE OF LETROZOLE (FEMARA): ICD-10-CM

## 2025-06-17 DIAGNOSIS — M81.0 AGE-RELATED OSTEOPOROSIS WITHOUT CURRENT PATHOLOGICAL FRACTURE: Chronic | ICD-10-CM

## 2025-06-17 DIAGNOSIS — E88.810 METABOLIC SYNDROME: Primary | ICD-10-CM

## 2025-06-17 DIAGNOSIS — I48.0 PAROXYSMAL ATRIAL FIBRILLATION: ICD-10-CM

## 2025-06-17 DIAGNOSIS — E03.9 ACQUIRED HYPOTHYROIDISM: Chronic | ICD-10-CM

## 2025-06-17 DIAGNOSIS — E78.2 MIXED HYPERLIPIDEMIA: Chronic | ICD-10-CM

## 2025-06-17 DIAGNOSIS — E66.812 CLASS 2 SEVERE OBESITY DUE TO EXCESS CALORIES WITH SERIOUS COMORBIDITY AND BODY MASS INDEX (BMI) OF 36.0 TO 36.9 IN ADULT: ICD-10-CM

## 2025-06-17 PROCEDURE — 1126F AMNT PAIN NOTED NONE PRSNT: CPT | Mod: CPTII,,, | Performed by: INTERNAL MEDICINE

## 2025-06-17 PROCEDURE — 1101F PT FALLS ASSESS-DOCD LE1/YR: CPT | Mod: CPTII,,, | Performed by: INTERNAL MEDICINE

## 2025-06-17 PROCEDURE — 1160F RVW MEDS BY RX/DR IN RCRD: CPT | Mod: CPTII,,, | Performed by: INTERNAL MEDICINE

## 2025-06-17 PROCEDURE — 99214 OFFICE O/P EST MOD 30 MIN: CPT | Mod: ,,, | Performed by: INTERNAL MEDICINE

## 2025-06-17 PROCEDURE — 3288F FALL RISK ASSESSMENT DOCD: CPT | Mod: CPTII,,, | Performed by: INTERNAL MEDICINE

## 2025-06-17 PROCEDURE — 3044F HG A1C LEVEL LT 7.0%: CPT | Mod: CPTII,,, | Performed by: INTERNAL MEDICINE

## 2025-06-17 PROCEDURE — 3078F DIAST BP <80 MM HG: CPT | Mod: CPTII,,, | Performed by: INTERNAL MEDICINE

## 2025-06-17 PROCEDURE — 3008F BODY MASS INDEX DOCD: CPT | Mod: CPTII,,, | Performed by: INTERNAL MEDICINE

## 2025-06-17 PROCEDURE — 1159F MED LIST DOCD IN RCRD: CPT | Mod: CPTII,,, | Performed by: INTERNAL MEDICINE

## 2025-06-17 PROCEDURE — 3075F SYST BP GE 130 - 139MM HG: CPT | Mod: CPTII,,, | Performed by: INTERNAL MEDICINE

## 2025-06-17 NOTE — ASSESSMENT & PLAN NOTE

## 2025-06-17 NOTE — ASSESSMENT & PLAN NOTE
-on metoprolol, rate and rhythm controlled, continue          Detail Level: Zone Render In Strict Bullet Format?: No Plan: -Recommend daily moisturizers \\n-Recommend Luke warm showers and gentle soap Plan: -Pt declined treatment at this time

## 2025-06-17 NOTE — PROGRESS NOTES
Subjective:      Patient ID: Riana Pastrana is a 69 y.o. female.    Chief Complaint: Follow-up (6 month thyroid/)    Ms. Yoo is a 69-year-old female who is here today for her six-month follow-up.  Her comorbidities are significant for PAF, hypertension, hyperlipidemia and breast cancer status post bilateral mastectomies in April of 2022, currently on letrozole and seems to be doing well.    Also has osteoporosis for which patient is on Prolia every 6 months and seems to be tolerating.    Since her last visit patient's hemoglobin A1c has been trending at 6.5, today noted to be at 6.8.  She now has a diagnosis of diabetes however would like to give it lifestyle modification and watch her carbohydrates and incorporate exercise before trying medicine.  We did discuss giving her a trial for 3 months and possibly getting her started on Ozempic if numbers continue to trend above 6.5.    No other acute needs or complaints today.      M CW: 12/13/2024    The patient's Health Maintenance was reviewed and the following appears to be due at this time:   Health Maintenance Due   Topic Date Due    Hepatitis C Screening  Never done    Diabetes Urine Screening  Never done    Foot Exam  Never done    TETANUS VACCINE  Never done    Shingles Vaccine (1 of 2) Never done    COVID-19 Vaccine (6 - 2024-25 season) 09/01/2024    Diabetic Eye Exam  12/18/2024        Past Medical History:  Past Medical History:   Diagnosis Date    History of bilateral breast cancer     left in 2007; bilateral in 2022    Hyperlipidemia 5/13/2022    Hypertension     Hypothyroidism 5/13/2022    Severe obesity (BMI 35.0-39.9) with comorbidity 5/13/2022     Past Surgical History:   Procedure Laterality Date    BREAST BIOPSY      BREAST SURGERY      CYST REMOVAL      HYSTERECTOMY      TONSILLECTOMY       Review of patient's allergies indicates:   Allergen Reactions    Penicillins Other (See Comments)     UNKNOWN REACTION. ALLERGY SINCE CHILDHOOD    "    Social History[1]  Family History   Problem Relation Name Age of Onset    Ovarian cancer Sister  50       Review of Systems    A comprehensive review of systems was performed and is negative except for that stated above  Objective:   /74 (BP Location: Right arm, Patient Position: Sitting)   Pulse 72   Ht 5' 4" (1.626 m)   Wt 97.1 kg (214 lb)   SpO2 96%   BMI 36.73 kg/m²     Physical Exam  Constitutional:       Appearance: Normal appearance. She is obese.   HENT:      Head: Normocephalic and atraumatic.      Nose: Nose normal.      Mouth/Throat:      Mouth: Mucous membranes are moist.      Pharynx: Oropharynx is clear.   Eyes:      Extraocular Movements: Extraocular movements intact.      Pupils: Pupils are equal, round, and reactive to light.   Cardiovascular:      Rate and Rhythm: Normal rate and regular rhythm.      Pulses: Normal pulses.   Pulmonary:      Effort: Pulmonary effort is normal.      Breath sounds: Normal breath sounds.   Abdominal:      General: Bowel sounds are normal.      Palpations: Abdomen is soft.   Musculoskeletal:         General: Normal range of motion.      Cervical back: Normal range of motion and neck supple.   Skin:     General: Skin is warm.   Neurological:      General: No focal deficit present.      Mental Status: She is alert and oriented to person, place, and time. Mental status is at baseline.   Psychiatric:         Mood and Affect: Mood normal.       Assessment/ Plan:   1. Metabolic syndrome  -     Comprehensive Metabolic Panel; Future; Expected date: 08/17/2025  -     Hemoglobin A1C; Future; Expected date: 08/17/2025  -     Microalbumin/Creatinine Ratio, Urine; Future; Expected date: 08/17/2025    2. Elevated hemoglobin A1c measurement  Assessment & Plan:  -patient advised on the importance of avoiding excessive carbohydrates and sugary food intake and having some form of routine aerobic exercise on a regular basis     Hemoglobin A1c   Date Value Ref Range Status "   06/12/2025 6.8 <=7.0 % Final   12/10/2024 6.5 <=7.0 % Final   12/08/2023 6.5 <=7.0 % Final      Patient would like to give it lifestyle modification before getting started on medication.    -we did discuss possibly getting her started on Ozempic if HGB A1c still above 6.5 at next visit    Orders:  -     Comprehensive Metabolic Panel; Future; Expected date: 08/17/2025  -     Hemoglobin A1C; Future; Expected date: 08/17/2025  -     Microalbumin/Creatinine Ratio, Urine; Future; Expected date: 08/17/2025    3. Class 2 severe obesity due to excess calories with serious comorbidity and body mass index (BMI) of 36.0 to 36.9 in adult  -     Comprehensive Metabolic Panel; Future; Expected date: 08/17/2025  -     Hemoglobin A1C; Future; Expected date: 08/17/2025  -     Microalbumin/Creatinine Ratio, Urine; Future; Expected date: 08/17/2025    4. Mixed hyperlipidemia  Assessment & Plan:  -patient on atorvastatin 20 mg p.o. daily, continue   -avoid excessive saturated fat intake, emphasized on importance of exercise            5. Paroxysmal atrial fibrillation  Assessment & Plan:  -on metoprolol, rate and rhythm controlled, continue             6. Primary hypertension  Assessment & Plan:  Well controlled.   Continue Metoprolol 25 mg p.o. daily and hydrochlorothiazide 25 mg p.o. daily  -also on Lasix that does affect her blood pressure  Low Sodium Diet (DASH Diet - Less than 2 grams of sodium per day).  Monitor blood pressure daily and log. Report consistent numbers greater than 140/90.  Maintain healthy weight with goal BMI <30. Exercise 30 minutes per day, 5 days per week.            7. Use of letrozole (Femara)  Assessment & Plan:  -monitor liver enzymes closely      8. History of bilateral mastectomy  Assessment & Plan:  -doing well, no acute issues, on letrozole      9. Age-related osteoporosis without current pathological fracture  Assessment & Plan:  -on treatment with Prolia every 6 months, doing well   -weight-bearing  exercises emphasized   -calcium plus vitamin-D on a regular basis            10. Acquired hypothyroidism  Assessment & Plan:  -TSH well controlled on current dose of levothyroxine at 50 mcg p.o. daily, continue              [1]   Social History  Socioeconomic History    Marital status:    Tobacco Use    Smoking status: Never    Smokeless tobacco: Never   Substance and Sexual Activity    Alcohol use: Never    Drug use: Never     Social Drivers of Health     Financial Resource Strain: Low Risk  (7/27/2023)    Overall Financial Resource Strain (CARDIA)     Difficulty of Paying Living Expenses: Not hard at all   Food Insecurity: No Food Insecurity (7/27/2023)    Hunger Vital Sign     Worried About Running Out of Food in the Last Year: Never true     Ran Out of Food in the Last Year: Never true   Transportation Needs: No Transportation Needs (7/27/2023)    PRAPARE - Transportation     Lack of Transportation (Medical): No     Lack of Transportation (Non-Medical): No   Physical Activity: Sufficiently Active (7/27/2023)    Exercise Vital Sign     Days of Exercise per Week: 7 days     Minutes of Exercise per Session: 60 min   Stress: No Stress Concern Present (7/27/2023)    [employer] [place] [employer] - Occupational Stress Questionnaire     Feeling of Stress : Not at all   Housing Stability: Unknown (7/27/2023)    Housing Stability Vital Sign     Unable to Pay for Housing in the Last Year: No     Unstable Housing in the Last Year: No

## 2025-06-17 NOTE — ASSESSMENT & PLAN NOTE
-patient advised on the importance of avoiding excessive carbohydrates and sugary food intake and having some form of routine aerobic exercise on a regular basis     Hemoglobin A1c   Date Value Ref Range Status   06/12/2025 6.8 <=7.0 % Final   12/10/2024 6.5 <=7.0 % Final   12/08/2023 6.5 <=7.0 % Final      Patient would like to give it lifestyle modification before getting started on medication.    -we did discuss possibly getting her started on Ozempic if HGB A1c still above 6.5 at next visit

## 2025-07-23 DIAGNOSIS — I10 PRIMARY HYPERTENSION: ICD-10-CM

## 2025-07-23 RX ORDER — HYDROCHLOROTHIAZIDE 25 MG/1
25 TABLET ORAL
Qty: 90 TABLET | Refills: 3 | Status: SHIPPED | OUTPATIENT
Start: 2025-07-23

## 2025-07-23 RX ORDER — METOPROLOL TARTRATE 25 MG/1
25 TABLET, FILM COATED ORAL 2 TIMES DAILY
Qty: 180 TABLET | Refills: 3 | Status: SHIPPED | OUTPATIENT
Start: 2025-07-23

## 2025-08-08 DIAGNOSIS — E55.9 VITAMIN D INSUFFICIENCY: ICD-10-CM

## 2025-08-08 DIAGNOSIS — E87.6 HYPOKALEMIA: ICD-10-CM

## 2025-08-08 DIAGNOSIS — N25.81 SECONDARY HYPERPARATHYROIDISM: ICD-10-CM

## 2025-08-08 RX ORDER — ERGOCALCIFEROL 1.25 MG/1
CAPSULE ORAL
Qty: 24 CAPSULE | Refills: 3 | Status: SHIPPED | OUTPATIENT
Start: 2025-08-08

## 2025-08-08 RX ORDER — POTASSIUM CHLORIDE 750 MG/1
20 CAPSULE, EXTENDED RELEASE ORAL
Qty: 180 CAPSULE | Refills: 3 | Status: SHIPPED | OUTPATIENT
Start: 2025-08-08